# Patient Record
Sex: FEMALE | Race: WHITE | NOT HISPANIC OR LATINO | Employment: FULL TIME | URBAN - NONMETROPOLITAN AREA
[De-identification: names, ages, dates, MRNs, and addresses within clinical notes are randomized per-mention and may not be internally consistent; named-entity substitution may affect disease eponyms.]

---

## 2017-03-08 ENCOUNTER — OFFICE VISIT - GICH (OUTPATIENT)
Dept: FAMILY MEDICINE | Facility: OTHER | Age: 52
End: 2017-03-08

## 2017-03-08 DIAGNOSIS — T14.8XXA OTHER INJURY OF UNSPECIFIED BODY REGION: ICD-10-CM

## 2017-03-20 ENCOUNTER — COMMUNICATION - GICH (OUTPATIENT)
Dept: FAMILY MEDICINE | Facility: OTHER | Age: 52
End: 2017-03-20

## 2017-03-20 DIAGNOSIS — M54.9 DORSALGIA: ICD-10-CM

## 2017-04-05 ENCOUNTER — COMMUNICATION - GICH (OUTPATIENT)
Dept: FAMILY MEDICINE | Facility: OTHER | Age: 52
End: 2017-04-05

## 2017-04-05 DIAGNOSIS — T14.8XXA OTHER INJURY OF UNSPECIFIED BODY REGION: ICD-10-CM

## 2017-05-07 ENCOUNTER — COMMUNICATION - GICH (OUTPATIENT)
Dept: FAMILY MEDICINE | Facility: OTHER | Age: 52
End: 2017-05-07

## 2017-05-07 DIAGNOSIS — T14.8XXA OTHER INJURY OF UNSPECIFIED BODY REGION: ICD-10-CM

## 2017-06-07 ENCOUNTER — HISTORY (OUTPATIENT)
Dept: FAMILY MEDICINE | Facility: OTHER | Age: 52
End: 2017-06-07

## 2017-06-07 ENCOUNTER — OFFICE VISIT - GICH (OUTPATIENT)
Dept: FAMILY MEDICINE | Facility: OTHER | Age: 52
End: 2017-06-07

## 2017-06-07 ENCOUNTER — COMMUNICATION - GICH (OUTPATIENT)
Dept: FAMILY MEDICINE | Facility: OTHER | Age: 52
End: 2017-06-07

## 2017-06-07 DIAGNOSIS — M70.50 OTHER BURSITIS OF KNEE, UNSPECIFIED KNEE: ICD-10-CM

## 2017-06-07 DIAGNOSIS — M17.12 PRIMARY OSTEOARTHRITIS OF LEFT KNEE: ICD-10-CM

## 2017-06-07 DIAGNOSIS — Z71.6 TOBACCO ABUSE COUNSELING: ICD-10-CM

## 2017-06-07 DIAGNOSIS — J44.9 CHRONIC OBSTRUCTIVE PULMONARY DISEASE (H): ICD-10-CM

## 2017-06-07 DIAGNOSIS — J42 CHRONIC BRONCHITIS (H): ICD-10-CM

## 2017-06-22 ENCOUNTER — AMBULATORY - GICH (OUTPATIENT)
Dept: FAMILY MEDICINE | Facility: OTHER | Age: 52
End: 2017-06-22

## 2017-06-22 ENCOUNTER — HOSPITAL ENCOUNTER (OUTPATIENT)
Dept: RADIOLOGY | Facility: OTHER | Age: 52
End: 2017-06-22
Attending: FAMILY MEDICINE

## 2017-06-22 DIAGNOSIS — J44.9 CHRONIC OBSTRUCTIVE PULMONARY DISEASE (H): ICD-10-CM

## 2017-06-22 DIAGNOSIS — M25.562 PAIN IN LEFT KNEE: ICD-10-CM

## 2017-06-28 ENCOUNTER — OFFICE VISIT - GICH (OUTPATIENT)
Dept: FAMILY MEDICINE | Facility: OTHER | Age: 52
End: 2017-06-28

## 2017-06-28 ENCOUNTER — HOSPITAL ENCOUNTER (OUTPATIENT)
Dept: RADIOLOGY | Facility: OTHER | Age: 52
End: 2017-06-28
Attending: NURSE PRACTITIONER

## 2017-06-28 ENCOUNTER — HISTORY (OUTPATIENT)
Dept: FAMILY MEDICINE | Facility: OTHER | Age: 52
End: 2017-06-28

## 2017-06-28 DIAGNOSIS — R07.81 PLEURODYNIA: ICD-10-CM

## 2017-06-28 DIAGNOSIS — S46.812A STRAIN OF OTHER MUSCLES, FASCIA AND TENDONS AT SHOULDER AND UPPER ARM LEVEL, LEFT ARM, INITIAL ENCOUNTER: ICD-10-CM

## 2017-06-28 DIAGNOSIS — S20.212A CONTUSION OF LEFT FRONT WALL OF THORAX: ICD-10-CM

## 2017-07-25 ENCOUNTER — COMMUNICATION - GICH (OUTPATIENT)
Dept: FAMILY MEDICINE | Facility: OTHER | Age: 52
End: 2017-07-25

## 2017-07-25 DIAGNOSIS — F41.0 PANIC DISORDER WITHOUT AGORAPHOBIA: ICD-10-CM

## 2017-10-26 ENCOUNTER — COMMUNICATION - GICH (OUTPATIENT)
Dept: FAMILY MEDICINE | Facility: OTHER | Age: 52
End: 2017-10-26

## 2017-10-26 DIAGNOSIS — F41.0 PANIC DISORDER WITHOUT AGORAPHOBIA: ICD-10-CM

## 2017-11-08 ENCOUNTER — OFFICE VISIT - GICH (OUTPATIENT)
Dept: FAMILY MEDICINE | Facility: OTHER | Age: 52
End: 2017-11-08

## 2017-11-08 ENCOUNTER — HISTORY (OUTPATIENT)
Dept: FAMILY MEDICINE | Facility: OTHER | Age: 52
End: 2017-11-08

## 2017-11-08 DIAGNOSIS — J45.20 MILD INTERMITTENT ASTHMA, UNCOMPLICATED: ICD-10-CM

## 2017-11-08 DIAGNOSIS — J42 CHRONIC BRONCHITIS (H): ICD-10-CM

## 2017-11-08 DIAGNOSIS — Z23 ENCOUNTER FOR IMMUNIZATION: ICD-10-CM

## 2017-11-08 DIAGNOSIS — F41.0 PANIC DISORDER WITHOUT AGORAPHOBIA: ICD-10-CM

## 2017-11-08 DIAGNOSIS — Z13.1 ENCOUNTER FOR SCREENING FOR DIABETES MELLITUS: ICD-10-CM

## 2017-11-08 DIAGNOSIS — T78.2XXA ANAPHYLACTIC SHOCK: ICD-10-CM

## 2017-11-08 DIAGNOSIS — E78.5 HYPERLIPIDEMIA: ICD-10-CM

## 2017-11-08 LAB
A/G RATIO - HISTORICAL: 1.4 (ref 1–2)
ALBUMIN SERPL-MCNC: 4.2 G/DL (ref 3.5–5.7)
ALP SERPL-CCNC: 67 IU/L (ref 34–104)
ALT (SGPT) - HISTORICAL: 13 IU/L (ref 7–52)
ANION GAP - HISTORICAL: 16 (ref 5–18)
AST SERPL-CCNC: 18 IU/L (ref 13–39)
BILIRUB SERPL-MCNC: 0.6 MG/DL (ref 0.3–1)
BUN SERPL-MCNC: 19 MG/DL (ref 7–25)
BUN/CREAT RATIO - HISTORICAL: 29
CALCIUM SERPL-MCNC: 9.9 MG/DL (ref 8.6–10.3)
CHLORIDE SERPLBLD-SCNC: 101 MMOL/L (ref 98–107)
CHOL/HDL RATIO - HISTORICAL: 3.42
CHOLESTEROL TOTAL: 236 MG/DL
CO2 SERPL-SCNC: 26 MMOL/L (ref 21–31)
CREAT SERPL-MCNC: 0.65 MG/DL (ref 0.7–1.3)
GFR IF NOT AFRICAN AMERICAN - HISTORICAL: >60 ML/MIN/1.73M2
GLOBULIN - HISTORICAL: 3 G/DL (ref 2–3.7)
GLUCOSE SERPL-MCNC: 93 MG/DL (ref 70–105)
HDLC SERPL-MCNC: 69 MG/DL (ref 23–92)
LDLC SERPL CALC-MCNC: 145 MG/DL
NON-HDL CHOLESTEROL - HISTORICAL: 167 MG/DL
POTASSIUM SERPL-SCNC: 4.3 MMOL/L (ref 3.5–5.1)
PROT SERPL-MCNC: 7.2 G/DL (ref 6.4–8.9)
PROVIDER ORDERDED STATUS - HISTORICAL: ABNORMAL
SODIUM SERPL-SCNC: 143 MMOL/L (ref 133–143)
TRIGL SERPL-MCNC: 108 MG/DL

## 2017-11-08 ASSESSMENT — ANXIETY QUESTIONNAIRES
6. BECOMING EASILY ANNOYED OR IRRITABLE: NEARLY EVERY DAY
GAD7 TOTAL SCORE: 15
4. TROUBLE RELAXING: NEARLY EVERY DAY
1. FEELING NERVOUS, ANXIOUS, OR ON EDGE: NEARLY EVERY DAY
5. BEING SO RESTLESS THAT IT IS HARD TO SIT STILL: NOT AT ALL
3. WORRYING TOO MUCH ABOUT DIFFERENT THINGS: SEVERAL DAYS
2. NOT BEING ABLE TO STOP OR CONTROL WORRYING: NEARLY EVERY DAY
7. FEELING AFRAID AS IF SOMETHING AWFUL MIGHT HAPPEN: MORE THAN HALF THE DAYS

## 2017-11-08 ASSESSMENT — PATIENT HEALTH QUESTIONNAIRE - PHQ9: SUM OF ALL RESPONSES TO PHQ QUESTIONS 1-9: 3

## 2017-11-16 ENCOUNTER — HISTORY (OUTPATIENT)
Dept: FAMILY MEDICINE | Facility: OTHER | Age: 52
End: 2017-11-16

## 2017-11-29 ENCOUNTER — COMMUNICATION - GICH (OUTPATIENT)
Dept: FAMILY MEDICINE | Facility: OTHER | Age: 52
End: 2017-11-29

## 2017-11-29 DIAGNOSIS — F41.0 PANIC DISORDER WITHOUT AGORAPHOBIA: ICD-10-CM

## 2017-12-27 NOTE — PROGRESS NOTES
Patient Information     Patient Name MRPuja Terrazas [id number] Female 1965      Progress Notes by Chaz Mcneill MD at 2017  9:00 AM     Author:  Chaz Mcneill MD Service:  (none) Author Type:  Physician     Filed:  2017  6:43 AM Encounter Date:  2017 Status:  Signed     :  Chaz Mcneill MD (Physician)            Nursing Notes:   Krista Muse  2017  9:53 AM  Signed  Patient came in today for medication review and refills. She is also due for fasting labs.      SUBJECTIVE:  Puja Ocasio is a 52 y.o. Female.  She comes in today for refills of her respiratory medications, antidepressants and to discuss smoking cessation. She is also due for cardiovascular, diabetes and breast cancer screenings.    Patient has had quite a bit of anxiety lately. She tells me that her son is struggling to adjust to adult life. He does have a job but is in the process of breaking up with his significant other. He has 3 children with 3 different women. She tells me that when things get difficult for him he just seems to walk away. She is trying to not enable him but this is a great deal of stress and she is constantly catastrophize in that she will get a call that he did something stupid and was injured or even killed. She reports she is not addicted to substances at least to the best of her knowledge beyond being prescribed Adderall for some time.    In addition to some catastrophizing. she reports ruminating thinking. Is very difficult for her to sleep. She has tried relaxation breathing which is minimally effective. She does continue to smoke. She is smoking due to anxiety.    She has not had any chest pain or shortness of breath.    OLYA-7 ANXIETY SCREENING 2017   OLYA date (doc flow) 2017   Nervous, anxious 3   Cannot stop worrying 3   Worry about different things 1   Cannot relax 3   Feeling restless 0   Easily annoyed/irritated 3   Afraid of  "awful event 2   Score 15   Severity severe anxiety   Some recent data might be hidden      Asthma Data 6/7/2017 11/8/2017   DATE COMPLETED (Timeframe for the following questions is the past 4 weeks) 6/7/2017 11/8/2017   1. Did you miss any work, school, or normal daily activities because of your asthma? 0-No 0-No   2. Did you wake up at night because of your asthma? 0-No 0-No   3. Did you believe that your asthma was well controlled? 0-Yes 0-Yes   4. Do you use an inhaler for quick relief from asthma symptoms? Yes Yes        IF YES, what was the greatest number of puffs in 1 day you took with the inhaler? 0- 1 to 4 0- 1 to 4   ATAQ Score 0 0   In the past 12 months, number of asthma-related emergency department visits not requiring hospitalization? 0 0   In the past 12 months, number of hospitalizations requiring an overnight stay due to asthma? 0 0   Date completed - -   Missed daily activities - -   Wake at night - -   Believe asthma in control - -   ELLIOTT use - -   Maximum ELLIOTT use in 1 day - -   ATAQ score - -   Asthma ED visits in past 12 mos - -   Asthma hospitalizations in past 12 mos - -   Some recent data might be hidden             Social History        Substance Use Topics          Smoking status:   Current Every Day Smoker      Packs/day:  0.50      Years:  25.00      Types:  Cigarettes      Smokeless tobacco:   Never Used      Alcohol use   Yes      Comment: occasional         I have personally reviewed and updated above noted social, family and/or past medical history.    A comprehensive review of systems was negative except for items noted in HPI/Subjective.      OBJECTIVE:  /72  Pulse 64  Ht 1.568 m (5' 1.75\")  Wt 71.7 kg (158 lb)  BMI 29.13 kg/m2  EXAM:  General Appearance: Pleasant, alert, appropriate appearance for age. No acute distress  Thyroid Exam: No nodules or enlargement.  Chest/Respiratory Exam: Normal chest wall and respirations. Clear to auscultation.  Cardiovascular Exam: " Regular rate and rhythm. S1, S2, no murmur, click, gallop, or rubs.  Gastrointestinal Exam: Soft, nontender, no abnormal masses or organomegaly.    ASSESSMENT/Plan :    I personally reviewed the patients' labs    Results for orders placed or performed in visit on 11/08/17      LIPID PANEL      Result  Value Ref Range    CHOLESTEROL,TOTAL 236 (H) <200 mg/dL    TRIGLYCERIDES 108 <150 mg/dL    HDL CHOLESTEROL 69 23 - 92 mg/dL    NON-HDL CHOLESTEROL 167 (H) <145 mg/dl    CHOL/HDL RATIO            3.42 <4.50                    LDL CHOLESTEROL 145 (H) <100 mg/dL    PROVIDER ORDERED STATUS FASTING    COMP METABOLIC PANEL      Result  Value Ref Range    SODIUM 143 133 - 143 mmol/L    POTASSIUM 4.3 3.5 - 5.1 mmol/L    CHLORIDE 101 98 - 107 mmol/L    CO2,TOTAL 26 21 - 31 mmol/L    ANION GAP 16 5 - 18                    GLUCOSE 93 70 - 105 mg/dL    CALCIUM 9.9 8.6 - 10.3 mg/dL    BUN 19 7 - 25 mg/dL    CREATININE 0.65 (L) 0.70 - 1.30 mg/dL    BUN/CREAT RATIO           29                    GFR if African American >60 >60 ml/min/1.73m2    GFR if not African American >60 >60 ml/min/1.73m2    ALBUMIN 4.2 3.5 - 5.7 g/dL    PROTEIN,TOTAL 7.2 6.4 - 8.9 g/dL    GLOBULIN                  3.0 2.0 - 3.7 g/dL    A/G RATIO 1.4 1.0 - 2.0                    BILIRUBIN,TOTAL 0.6 0.3 - 1.0 mg/dL    ALK PHOSPHATASE 67 34 - 104 IU/L    ALT (SGPT) 13 7 - 52 IU/L    AST (SGOT) 18 13 - 39 IU/L       Puja was seen today for medication management.    Diagnoses and all orders for this visit:    Hyperlipidemia, unspecified hyperlipidemia type  cholesterol reasonable. LDL slightly high but so is HDL. Ratio is good. Strongly suggest tobacco cessation.  -     LIPID PANEL; Future  -     LIPID PANEL    PANIC ATTACK  patient with breakthrough anxiety despite Effexor. I explained to her that Effexor is fairly activating and there may be a better choice for her to help with anxiety. She reports she has tried other medications and that did not seem to  work very well or she had side effects. Should like to optimize dosage prior to transition. We'll increase to 225 mg. Patient will follow-up in a month or 2, sooner if mood does not improve and may space it out further if she has excellent resolution.    We did have a lengthy talk today about drying boundaries with her son. Strongly suggest counseling. Patient was good understanding of all of my concerns.    -     venlafaxine (EFFEXOR XR) 150 mg Extended-Release capsule; Take 1 capsule by mouth once daily with evening meal.  -     venlafaxine (EFFEXOR) 75 mg tablet; Take 1 tablet with the 150mg capsule (ok to sub capsule for tablet)    Mild intermittent asthma without complication   well controlled despite ongoing tobacco abuse. Medications refilled and strongly suggest tobacco cessation    Screening for diabetes mellitus  normal.  -     COMP METABOLIC PANEL; Future  -     COMP METABOLIC PANEL    Need for prophylactic vaccination with combined diphtheria-tetanus-pertussis (DTaP) vaccine  -     TDAP VACCINE IM  -     LA ADMIN VACC INITIAL          There are no Patient Instructions on file for this visit.    Chaz Mcneill MD    This document was created using computer generated templates and voice activated software.

## 2017-12-27 NOTE — PROGRESS NOTES
Patient Information     Patient Name MRN Puja Davis [id number] Female 1965      Progress Notes by Jordana Mehta NP at 2017  9:45 AM     Author:  Jordana Mehta NP Service:  (none) Author Type:  PHYS- Nurse Practitioner     Filed:  2017  2:07 PM Encounter Date:  2017 Status:  Signed     :  Jordana Mehta NP (PHYS- Nurse Practitioner)            Nursing Notes:   Bita Goins  2017 10:23 AM  Signed  Patient presents with pain near left shoulder. Patient fell last Thursday. Patient now has pain when taking a deep breath. Patient has tried meloxicam and ibuprofen but not together, ice and heat and tramadol. Bita Goins LPN .............2017  9:52 AM  SUBJECTIVE:    Puja Ocasio is a 52 y.o. female who presents for rib pain    Chest Pain    This is a new problem. The current episode started in the past 7 days. The onset quality is sudden. The problem occurs constantly. The problem has been unchanged. The pain is present in the lateral region. The pain is at a severity of 8/10. The pain is severe. The quality of the pain is described as sharp, stabbing and tearing. The pain radiates to the left shoulder (LT upper rib cage and side). Pertinent negatives include no cough, dizziness, fever, headaches, lower extremity edema, malaise/fatigue, nausea, orthopnea, shortness of breath or sputum production. Associated symptoms comments: She fell a week ago and has pain in the upper chest, LT side under arm and into scapula area. . The pain is aggravated by coughing and deep breathing. She has tried NSAIDs, rest, acetaminophen and analgesics for the symptoms. The treatment provided mild relief. There are no known risk factors.   Her past medical history is significant for recent injury.   Pertinent negatives for past medical history include no connective tissue disease, no COPD, no CHF, no MI, no rheumatic fever, no sleep apnea, no  spontaneous pneumothorax and no stimulant use.       Current Outpatient Prescriptions on File Prior to Visit       Medication  Sig Dispense Refill     albuterol HFA (PROAIR HFA) 90 mcg/actuation inhaler Inhale 2 Puffs by mouth every 4 hours if needed. 8.5 g 6     EPINEPHrine (EPIPEN) 0.3 mg/0.3 mL injection Inject 0.3 mg intramuscular one time if needed for Allergic Reaction for up to 1 dose. 2 Each 0     Inhalational Spacing Device (AEROCHAMBER MV) For home use. 1 Device 0     mometasone-formoterol (DULERA) 100-5 mcg/actuation inhaler Inhale 2 Puffs by mouth 2 times daily. 1 Inhaler 12     varenicline (CHANTIX CONTINUING MONTH BOX) 1 mg tablet Take 1 mg by mouth once daily with a meal. 60 tablet 4     venlafaxine (EFFEXOR XR) 150 mg Extended-Release capsule Take 1 capsule by mouth once daily with evening meal. 30 capsule 11     No current facility-administered medications on file prior to visit.        REVIEW OF SYSTEMS:  Review of Systems   Constitutional: Negative for fever and malaise/fatigue.   Respiratory: Negative for cough, sputum production and shortness of breath.    Cardiovascular: Positive for chest pain. Negative for orthopnea.   Gastrointestinal: Negative for nausea.   Neurological: Negative for dizziness and headaches.       OBJECTIVE:  /78  Pulse 88  Temp 96.8  F (36  C) (Temporal)  Wt 68.9 kg (152 lb)  Breastfeeding? No  BMI 26.93 kg/m2    EXAM:   Physical Exam   Constitutional: She is well-developed, well-nourished, and in no distress.   HENT:   Head: Normocephalic and atraumatic.   Eyes: Conjunctivae are normal.   Neck: Neck supple.   Cardiovascular: Normal rate, regular rhythm and normal heart sounds.    Pulmonary/Chest: Effort normal and breath sounds normal. No respiratory distress. She has no decreased breath sounds. She has no wheezes. She has no rhonchi. She has no rales.               Diagrams show where she is tender to palpation.    Musculoskeletal:        Left shoulder: She  exhibits tenderness.        Cervical back: Normal.   Tender over the LT Trapezius    Lymphadenopathy:     She has no cervical adenopathy.   Nursing note and vitals reviewed.    Completed Rib xray.  I personally reviewed the xray. There was no fractures upon initial read of xray.  Final read pending by radiology.    ASSESSMENT/PLAN:    ICD-10-CM    1. Rib pain on left side R07.81 XR RIBS LEFT AND PA CHEST MINIMUM 3 VIEWS      HYDROcodone-acetaminophen, 5-325 mg, (NORCO) per tablet   2. Trapezius muscle strain, left, initial encounter S46.812A cyclobenzaprine (FLEXERIL) 5 mg tablet      HYDROcodone-acetaminophen, 5-325 mg, (NORCO) per tablet   3. Rib contusion, left, initial encounter S20.212A HYDROcodone-acetaminophen, 5-325 mg, (NORCO) per tablet        Plan:  Home cares and OTC gone over. Meds discussed including narcotics.   I have prescribed a narcotic:    1) Please make sure you read the handout the pharmacist gives you on this drug.   2) Any unused narcotics need to be destroyed or taken to the police department.   3) Do not save unused narcotics   4) Do not give your pills to other people.   5) Do not crush, snort or otherwise alter the drug  6) Do not use in combination with other drugs such as benadryl, alcohol, or benzodiopines.   7) If you have any questions on your medication please call your pharmacist or PCP office.   8) This will not be refilled.     I explained my diagnostic considerations and recommendations to the patient, who voiced understanding and agreement with the treatment plan. All questions were answered. We discussed potential side effects of any prescribed or recommended therapies, as well as expectations for response to treatments. She was advised to contact our office if there is no improvement or worsening of conditions or symptoms.  If s/s worsen or persist, patient will either come back or follow up with PCP.       ANNITA SAMS NP ....................  6/28/2017   2:06  PM

## 2017-12-28 NOTE — TELEPHONE ENCOUNTER
Patient Information     Patient Name MRN Sex     Puja Ocasio [id number] Female 1965      Telephone Encounter by Marcel Grey RN at 2017  9:14 AM     Author:  Marcel Grey RN Service:  (none) Author Type:  NURS- Registered Nurse     Filed:  2017  9:18 AM Encounter Date:  2017 Status:  Signed     :  Marcel Grey RN (NURS- Registered Nurse)            Redundant Refill Request for Effexor XR refused;    Prescribing Provider: Chaz Livingston MD             Order Date: 2017  Ordered by: CHAZ LIVINGSTON  Medication:venlafaxine (EFFEXOR XR) 150 mg Extended-Release capsule    Qty:90 capsule  Ref:3  Start:2017   End:              Route:Oral                  IRENE:No   Class:eRx    Sig:Take 1 capsule by mouth once daily with evening meal.    Pharmacy:[facility] [address] [place] [address] AT [address]              OF [facility] [address] [phone]    Unable to complete prescription refill per RN Medication Refill Policy.................... Marcel Grey RN ....................  2017   9:14 AM

## 2017-12-28 NOTE — TELEPHONE ENCOUNTER
Patient Information     Patient Name MRN Sex Puja Goddard [id number] Female 1965      Telephone Encounter by Marcel Grey RN at 2017  1:11 PM     Author:  Marcel Grey RN Service:  (none) Author Type:  NURS- Registered Nurse     Filed:  2017  1:18 PM Encounter Date:  2017 Status:  Signed     :  Marcel Grey RN (NURS- Registered Nurse)            Depression-in adults 18 and over  Serotonin/Norepinephrine Reuptake Inhibitors    Office visit in the past 12 months or as indicated in chart.  Should have clinic visit 1-2 months after initial prescription.    Last visit with CARLOS ALBERTO LIVINGSTON was on: 2017 in [facility]  Next visit with CARLOS ALBERTO LIVINGSTON is on: No future appointment listed with this provider  Next visit with Family Practice is on: No future appointment listed in this department    Max refills 12 months from last office visit or per providers notes.    PHQ Depression Screening 10/27/2016 2017   Date of PHQ exam (doc flow) 10/27/2016 2017   1. Lack of interest/pleasure 0 - Not at all 0 - Not at all   2. Feeling down/depressed 0 - Not at all 0 - Not at all   PHQ-2 TOTAL SCORE 0 0   3. Trouble sleeping - -   4. Decreased energy - -   5. Appetite change - -   6. Feelings of failure - -   7. Trouble concentrating - -   8. Activity level - -   9. Hurting yourself - -   PHQ-9 TOTAL SCORE - -   PHQ-9 Severity Level - -   Functional Impairment - -   Some recent data might be hidden       Patient is due for medication management appointment as last office visit with PCP to discuss use of effexor, as well as diagnosis of depression with anxiety was on 16, of which effexor was continued. Limited refill provided at this time and letter sent for reminder to patient. Prescription refilled per RN Medication Refill Policy.................... Marcel Grey RN ....................  2017   1:15 PM

## 2017-12-28 NOTE — PATIENT INSTRUCTIONS
Patient Information     Patient Name MRN Sex     Puja Ocasio [id number] Female 1965      Patient Instructions by Jordana Mehta NP at 2017  9:45 AM     Author:  Jordana Mehta NP  Service:  (none) Author Type:  PHYS- Nurse Practitioner     Filed:  2017 10:51 AM  Encounter Date:  2017 Status:  Addendum     :  Jordana Mehta NP (PHYS- Nurse Practitioner)        Related Notes: Original Note by Jordana Mehta NP (PHYS- Nurse Practitioner) filed at 2017 10:45 AM            The x-ray today showed no sign of fracture. Radiologist will review the X-ray within 24-48 hours, I will contact you if the radiologist finds anything of significance on the x-ray that I did not see.    Rest and avoid any activity which causes pain.    Apply cold packs to the affected area for 15-20 minutes, 4 times a day. A bag of frozen corn or peas often works well as a cold pack. A cold pack is usually the best treatment for the 1st 2 days after an injury. After 48 hours, apply heat or ice, whichever gives relief.    Cough with splinting the ribcage      Also, Take ibuprofen (Advil, Motrin) or naproxen (Aleve), or a similar prescription medication. Use regularly for the first 7-10 days. Later, take as needed for pain, swelling or stiffness. Take this type of medication with food to minimize any stomach irritation. Tylenol may also be taken to help ease the pain.    Call or return to clinic as needed if your pain becomes significantly worse, or fails to improve as anticipated despite following the above recommendations.    I have prescribed a narcotic:    1) Please make sure you read the handout the pharmacist gives you on this drug.   2) Any unused narcotics need to be destroyed or taken to the police department.   3) Do not save unused narcotics   4) Do not give your pills to other people.   5) Do not crush, snort or otherwise alter the drug  6) Do not use in combination with other drugs  such as benadryl, alcohol, or benzodiopines.   7) If you have any questions on your medication please call your pharmacist or PCP office.   8) This will not be refilled.

## 2017-12-28 NOTE — TELEPHONE ENCOUNTER
Patient Information     Patient Name MRN Sex     Puja Ocasio [id number] Female 1965      Telephone Encounter by Marcel Grey RN at 2017 11:46 AM     Author:  Marcel Grey RN Service:  (none) Author Type:  NURS- Registered Nurse     Filed:  2017 11:49 AM Encounter Date:  2017 Status:  Signed     :  Marcel Grey RN (NURS- Registered Nurse)            This is a Refill request from: [facility] pharmacy  Name of Medication: Spacer for inhaler  Quantity requested: 1 device with 1 refill  Last fill date: Unknown as per rx request  Due for refill: Yes, as per chart review and rx request  Last visit with CHAZ LIVINGSTON was on: 2017 in [facility]  PCP:  Chaz Livingston MD  Controlled Substance Agreement:  N/A   Diagnosis r/t this medication request: COPD     Unable to complete prescription refill per RN Medication Refill Policy.................... Marcel Grey RN ....................  2017   11:47 AM

## 2017-12-28 NOTE — PROGRESS NOTES
Patient Information     Patient Name MRN Puja Davis [id number] Female 1965      Progress Notes by Chaz Mcneill MD at 2017 12:00 PM     Author:  Chaz Mcneill MD  Service:  (none) Author Type:  Physician     Filed:  2017 12:05 PM  Encounter Date:  2017 Status:  Addendum     :  Chaz Mcneill MD (Physician)        Related Notes: Original Note by Chaz Mcneill MD (Physician) filed at 2017  7:59 AM            SUBJECTIVE:  Puja Ocasio is a 51 y.o. Female.  She comes in today for evaluation of left knee pain. Symptoms have been present for one month. She describes pain over the medial side of her knee. Worse with bending and especially with kneeling but the kneeling pain seems to be more infrapatellar. If she sits down and gets weight off of it seems to be better. Pain does not radiate anywhere. Has not noticed any swelling at the actual knee joint but does report some swelling just at the area where it hurts. Did not have any trauma that she is aware. Described as moderate to severe.    Reports as well as been reasonably well-controlled. She might have an insurance change coming up. Has been taking Advair somewhat intermittently.    Asthma Data 2016   DATE COMPLETED (Timeframe for the following questions is the past 4 weeks) - 2017   1. Did you miss any work, school, or normal daily activities because of your asthma? - 0-No   2. Did you wake up at night because of your asthma? - 0-No   3. Did you believe that your asthma was well controlled? - 0-Yes   4. Do you use an inhaler for quick relief from asthma symptoms? - Yes   If yes, what was the greatest number of puffs in 1 day you took with the inhaler? If No, select 0. - 0- 1 to 4   ATAQ Score - 0   In the past 12 months, number of asthma-related emergency department visits not requiring hospitalization? - (No Data)   In the past 12 months, number of  hospitalizations requiring an overnight stay due to asthma? - (No Data)   Date completed 8/8/2016 -   Missed daily activities no = 0 -   Wake at night no = 0 -   Believe asthma in control yes = 0 -   ELLIOTT use yes -   Maximum ELLIOTT use in 1 day 1-4 = 0 -   ATAQ score 0 = well controlled -   Asthma ED visits in past 12 mos 0 -   Asthma hospitalizations in past 12 mos 0 -         OBJECTIVE:  /64  Pulse 84  Wt 69.4 kg (153 lb)  BMI 27.1 kg/m2  EXAM:  General Appearance: Pleasant, alert, appropriate appearance for age. No acute distress  Chest/Respiratory Exam: Normal chest wall and respirations. Clear to auscultation.  Cardiovascular Exam: Regular rate and rhythm. S1, S2, no murmur, click, gallop, or rubs.  Musculoskeletal Exam: No synovitis.  Muscle strength age and body habitus appropriate as well as equal and even. Affected knee with no visual defomities.  No effusion present.  Patella with crepitus.  No joint line tenderness it is quite tender over presents over pes anserine bursa All knee ligaments in tact and bilaterally symmetric.  Victor Hugo's is negative.  Some pain with deep knee flexion.      Results for orders placed or performed during the hospital encounter of 10/21/16      BASIC METABOLIC PANEL      Result  Value Ref Range    SODIUM 142 133 - 143 mmol/L    POTASSIUM 3.6 3.5 - 5.1 mmol/L    CHLORIDE 105 98 - 107 mmol/L    CO2,TOTAL 28 21 - 31 mmol/L    ANION GAP 9 5 - 18                    GLUCOSE 111 (H) 70 - 105 mg/dL    CALCIUM 9.4 8.6 - 10.3 mg/dL    BUN 15 7 - 25 mg/dL    CREATININE 0.89 0.70 - 1.30 mg/dL    BUN/CREAT RATIO           17                    GFR if African American >60 >60 ml/min/1.73m2    GFR if not African American >60 >60 ml/min/1.73m2   CBC WITH AUTO DIFFERENTIAL      Result  Value Ref Range    WHITE BLOOD COUNT         5.6 4.5 - 11.0 thou/cu mm    RED BLOOD COUNT           5.05 4.00 - 5.20 mil/cu mm    HEMOGLOBIN                15.1 12.0 - 16.0 g/dL    HEMATOCRIT                 44.6 33.0 - 51.0 %    MCV                       88 80 - 100 fL    MCH                       29.9 26.0 - 34.0 pg    MCHC                      33.8 32.0 - 36.0 g/dL    RDW                       11.9 11.5 - 15.5 %    PLATELET COUNT            312 140 - 440 thou/cu mm    MPV                       7.3 6.5 - 11.0 fL    NEUTROPHILS               33.5 (L) 42.0 - 72.0 %    LYMPHOCYTES               62.4 (H) 20.0 - 44.0 %    MONOCYTES                 1.3 <12.0 %    EOSINOPHILS               0.8 <8.0 %    BASOPHILS                 2.0 <3.0 %    ABSOLUTE NEUTROPHILS      1.9 1.7 - 7.0 thou/cu mm    ABSOLUTE LYMPHOCYTES      3.5 (H) 0.9 - 2.9 thou/cu mm    ABSOLUTE MONOCYTES        0.1 <0.9 thou/cu mm    ABSOLUTE EOSINOPHILS      0.0 <0.5 thou/cu mm    ABSOLUTE BASOPHILS        0.1 <0.3 thou/cu mm       ASSESSMENT/Plan :      Puja was seen today for knee pain/problem.    Diagnoses and all orders for this visit:    Primary osteoarthritis of left knee  patient likely has some degree of osteoarthritis, mainly patellar.  I think most her pain however is due to presence or and bursitis.  Suggest physical therapy.  -     XR KNEE WB 1 VIEW AP BILATERAL AND 2 VIEWS LEFT; Future    Chronic obstructive pulmonary disease, unspecified COPD type (HC)  -     Inhalational Spacing Device (AEROCHAMBER MV); For home use.      Pes anserine bursitis  Risks, benefits and alternatives discussed with patient.  They voiced good understanding and had the opportunity to ask questions.  Patient gave consent and proceed with procedure.     The patient was prepped using chlorhexidine.  Using sterile procedure 20 mg of Kenalog was mixed with 1.5 mL of 1% lidocaine and 1 mL 0.25% bupivacaine per total volume of 3 mL.  Solution was injected into the pes anserine bursa.  Patient tolerated procedure well and had improvement of symptoms following.    -     AMB CONSULT TO PHYSICAL THERAPY; Future  -     TN DRAIN/INJECT LARGE JOINT/BURSA (hip, knee,shoulder)  - single joint  [68187.0]  -     triamcinolone acetonide 20 mg injection (KENALOG); Inject 2 mL intra-articular one time.    Chronic bronchitis, unspecified chronic bronchitis type (HC)  we'll refill albuterol and Dulera. Continue current medications.  -     albuterol HFA (PROAIR HFA) 90 mcg/actuation inhaler; Inhale 2 Puffs by mouth every 4 hours if needed.    Encounter for tobacco use cessation counseling  Patient would like to continue Chantix  -     varenicline (CHANTIX CONTINUING MONTH BOX) 1 mg tablet; Take 1 mg by mouth once daily with a meal.        There are no Patient Instructions on file for this visit.    Chaz Mcneill MD    This document was created using computer generated templates and voice activated software.

## 2017-12-28 NOTE — TELEPHONE ENCOUNTER
Patient Information     Patient Name MRPuja Terrazas [id number] Female 1965      Telephone Encounter by Marcel Grey RN at 10/30/2017  9:49 AM     Author:  Marcel Grey RN Service:  (none) Author Type:  NURS- Registered Nurse     Filed:  10/30/2017 10:00 AM Encounter Date:  10/26/2017 Status:  Signed     :  Marcel Grey RN (NURS- Registered Nurse)            Depression-in adults 18 and over  Serotonin/Norepinephrine Reuptake Inhibitors    Office visit in the past 12 months or as indicated in chart.  Should have clinic visit 1-2 months after initial prescription.    Last visit with CARLOS ALBERTO LIVINGSTON was on: 2017 in [facility]  Next visit with CARLOS ALBERTO LIVINGSTON is on: No future appointment listed with this provider  Next visit with Family Practice is on: No future appointment listed in this department    Max refills 12 months from last office visit or per providers notes.    PHQ Depression Screening 10/27/2016 2017   Date of PHQ exam (doc flow) 10/27/2016 2017   1. Lack of interest/pleasure 0 - Not at all 0 - Not at all   2. Feeling down/depressed 0 - Not at all 0 - Not at all   PHQ-2 TOTAL SCORE 0 0   3. Trouble sleeping - -   4. Decreased energy - -   5. Appetite change - -   6. Feelings of failure - -   7. Trouble concentrating - -   8. Activity level - -   9. Hurting yourself - -   PHQ-9 TOTAL SCORE - -   PHQ-9 Severity Level - -   Functional Impairment - -   Some recent data might be hidden       Chart review shows that patient is overdue for an office visit with PCP. Was sent a reminder letter with last refill on 17-See 17 refill encounter. Call placed to patient to discuss. Patient is willing to see PCP for an appointment. Writer advised patient that he could fill rx until she could be seen. Patient happy with plan of care. Patient was transferred to scheduling staff for an appointment. Appointment noted to be scheduled for 17.  Writer will refill rx as requested to get patient through her appointment date as noted.    Prescription refilled per RN Medication Refill Policy.................... Marcel Grey RN ....................  10/30/2017   9:57 AM

## 2017-12-28 NOTE — ADDENDUM NOTE
Patient Information     Patient Name MRN Sex Puja Goddard [id number] Female 1965      Addendum Note by Chaz Livingston MD at 2017 12:05 PM     Author:  Chaz Livingston MD Service:  (none) Author Type:  Physician     Filed:  2017 12:05 PM Encounter Date:  2017 Status:  Signed     :  Chaz Livingston MD (Physician)       Addended by: CHAZ LIVINGSTON on: 2017 12:05 PM        Modules accepted: Orders

## 2017-12-30 NOTE — NURSING NOTE
Patient Information     Patient Name MRN Sex Puja Goddard [id number] Female 1965      Nursing Note by Bita Goins at 2017  9:45 AM     Author:  Bita Goins Service:  (none) Author Type:  NURS- Student Practical Nurse     Filed:  2017 10:23 AM Encounter Date:  2017 Status:  Signed     :  Bita Goins (NURS- Student Practical Nurse)            Patient presents with pain near left shoulder. Patient fell last Thursday. Patient now has pain when taking a deep breath. Patient has tried meloxicam and ibuprofen but not together, ice and heat and tramadol. Bita Goins LPN .............2017  9:52 AM

## 2017-12-30 NOTE — NURSING NOTE
Patient Information     Patient Name MRN Sex     Puja Ocasio [id number] Female 1965      Nursing Note by Krista Muse at 2017  9:00 AM     Author:  Krista Muse Service:  (none) Author Type:  (none)     Filed:  2017  9:53 AM Encounter Date:  2017 Status:  Signed     :  Krista Muse            Patient came in today for medication review and refills. She is also due for fasting labs.

## 2018-01-03 NOTE — TELEPHONE ENCOUNTER
Patient Information     Patient Name MRN Sex     Puja Ocasio [id number] Female 1965      Telephone Encounter by Mary Hassan at 3/20/2017 10:10 AM     Author:  Mary Hassan Service:  (none) Author Type:  (none)     Filed:  3/20/2017 10:13 AM Encounter Date:  3/20/2017 Status:  Signed     :  Mary Hassan            She has been using the Meloxicam and Flexeril since she saw you, but it's not working. She says you mentioned something else, like Prednisone or something? What else could you try?  Mary Hassan CMA(AAMA)  ..................3/20/2017   10:11 AM

## 2018-01-03 NOTE — PROGRESS NOTES
Patient Information     Patient Name MRN Sex     Puja Ocasio [id number] Female 1965      Progress Notes by Chaz Mcneill MD at 3/8/2017  3:00 PM     Author:  Chaz Mcneill MD Service:  (none) Author Type:  Physician     Filed:  3/13/2017  1:25 PM Encounter Date:  3/8/2017 Status:  Signed     :  Chaz Mcneill MD (Physician)            SUBJECTIVE:  Puja Ocasio is a 51 y.o. Female.  Patient comes in after slipping and falling on ice about 6 weeks ago. She reports partially catching a softball landing on her back. She ha had the wind knocked out of her. She denies any head trauma or symptoms of concussion. Pain initially was moderate then became quite severe for a couple days afterwards. Gradually improved but now over the past few weeks has seemed to stagnate. Described as a sharp pain in her upper back. Worse with breathing or twisting and better with sitting still. Pain does not radiate. Seems to worse as the day goes on. Occasionally has spasm sensation. Currently moderate. Ibuprofen helps as does icing. She reports no shortness of breath but does report some pain with deep inspiration. No hemoptysis.  No pain with compression of thorax.      OBJECTIVE:  /82  Pulse 72  Wt 73.9 kg (163 lb)  BMI 28.87 kg/m2  EXAM:  General Appearance: Pleasant, alert, appropriate appearance for age. No acute distress  Chest/Respiratory Exam: Normal chest wall and respirations. Clear to auscultation.  Cardiovascular Exam: Regular rate and rhythm. S1, S2, no murmur, click, gallop, or rubs.  Musculoskeletal Exam: Tender with palpation of paraspinous muscle of thoracic spine around T8. Also some tenderness with rhomboids. Pain is readily reproducible with palpation.        ASSESSMENT/Plan :      Puja was seen today for follow up.    Diagnoses and all orders for this visit:    Muscle strain  I reviewed her imaging. Lung fields clear. No evidence of rib fracture. Patient's  current symptoms most consistent with slow to resolve muscle strain. We'll transition from ibuprofen to meloxicam. Stop all other NSAIDs. Continue with icing. May utilize Flexeril at night to help with sleep. If symptoms do not resolve in the next couple of weeks suggest physical therapy. I did offer her physical therapy today but she would like to hold off a couple of weeks. If she develops any definite shortness of breath or hemoptysis she will come in emergently.  -     cyclobenzaprine (FLEXERIL) 10 mg tablet; Take 1 tablet by mouth at bedtime if needed for Muscle Spasm.  -     meloxicam 15 mg tablet; Take 1 tablet by mouth once daily.        There are no Patient Instructions on file for this visit.    Chaz Mcneill MD    This document was created using computer generated templates and voice activated software.

## 2018-01-04 NOTE — TELEPHONE ENCOUNTER
Patient Information     Patient Name MRN Puja Davis [id number] Female 1965      Telephone Encounter by Chaz Mcneill MD at 3/24/2017 12:09 PM     Author:  Chaz Mcneill MD Service:  (none) Author Type:  Physician     Filed:  3/24/2017 12:10 PM Encounter Date:  3/20/2017 Status:  Signed     :  Chaz Mcneill MD (Physician)            We can do a prednisone burst or try some tramadol which is a selective narcotic.  Please call to get update and I can send out one or both of meds.

## 2018-01-04 NOTE — TELEPHONE ENCOUNTER
Patient Information     Patient Name MRN Sex Puja Goddard [id number] Female 1965      Telephone Encounter by Krista Muse at 2017  9:42 AM     Author:  Krista Muse Service:  (none) Author Type:  (none)     Filed:  2017  9:43 AM Encounter Date:  2017 Status:  Signed     :  Krista Muse            Patient states that she did not request this refill and is actually doing better. She does not need the refill at this time and will call or come in for a visit if anything changes.

## 2018-01-04 NOTE — TELEPHONE ENCOUNTER
Patient Information     Patient Name MRN Sex     Puja Ocasio [id number] Female 1965      Telephone Encounter by Marcel Grey RN at 2017  3:53 PM     Author:  Marcel Grey RN Service:  (none) Author Type:  NURS- Registered Nurse     Filed:  2017  4:12 PM Encounter Date:  2017 Status:  Signed     :  Marcel Grey RN (NURS- Registered Nurse)            Refill request received from [facility] for patient's meloxicam. Per chart review and last rx refill on 17, patient had stated she didn't actually need a refill of meloxicam, and PCP states that continued use requires follow up. Call placed to patient to discuss this rx refill from pharmacy. Patient confirms that refill is not needed. Requests that rx be taken off of autofill at pharmacy. Patient states she will follow up with PCP is additional fills needed. Call placed to pharmacy. Spoke to Garrett, pharmacist and advised him of above. Garrett stated understanding. Will remove rx request from system at this time. Writer will refuse rx request at this time as per patient's request.    Unable to complete prescription refill per RN Medication Refill Policy.................... Marcel Grey RN ....................  2017   4:10 PM

## 2018-01-04 NOTE — TELEPHONE ENCOUNTER
Patient Information     Patient Name MRN Sex Puja Goddard [id number] Female 1965      Telephone Encounter by Chaz Mcneill MD at 2017  9:31 AM     Author:  Chaz Mcneill MD Service:  (none) Author Type:  Physician     Filed:  2017  9:31 AM Encounter Date:  2017 Status:  Signed     :  Chaz Mcneill MD (Physician)            Suggest follow up if still having issues beyond the next couple weeks.

## 2018-01-04 NOTE — TELEPHONE ENCOUNTER
Patient Information     Patient Name MRN Sex     Puja Ocasio [id number] Female 1965      Telephone Encounter by Marcel Grey RN at 2017  9:10 AM     Author:  Marcel Grey RN Service:  (none) Author Type:  NURS- Registered Nurse     Filed:  2017  9:16 AM Encounter Date:  2017 Status:  Signed     :  Marcel Grey RN (NURS- Registered Nurse)            Nsaids    Office visit in the past 12 months or per provider note.    Last visit with CARL[facility] ALBERTO LIVINGSTON was on: 2017 in [facility]  Next visit with CARLOS ALBERTO LIVINGSTON is on: No future appointment listed with this provider  Next visit with Family Practice is on: No future appointment listed in this department    Max refill for 12 months from last office visit or per provider note.    Chart review shows that patient was started on Mobic at 3/8/17 office visit with PCP. No plan noted in relation to continued use of requested Rx. Will refill rx for 30 day supply and route encounter to PCP for his consideration of future refills.    Prescription refilled per RN Medication Refill Policy.................... Marcel Grey RN ....................  2017   9:14 AM

## 2018-01-04 NOTE — TELEPHONE ENCOUNTER
Patient Information     Patient Name MRN Sex     Puja Ocasio [id number] Female 1965      Telephone Encounter by Mary Hassan at 3/24/2017 12:12 PM     Author:  Mary Hassan Service:  (none) Author Type:  (none)     Filed:  3/24/2017 12:14 PM Encounter Date:  3/20/2017 Status:  Signed     :  Mary Hassan            She would like to have both sent in. She will try using the Tramadol only at night because she hurts the worst then.  Mary Hassan CMA(AAMA)  ..................3/24/2017   12:14 PM

## 2018-01-27 VITALS — DIASTOLIC BLOOD PRESSURE: 82 MMHG | WEIGHT: 163 LBS | HEART RATE: 72 BPM | SYSTOLIC BLOOD PRESSURE: 128 MMHG

## 2018-01-27 VITALS
BODY MASS INDEX: 27.1 KG/M2 | SYSTOLIC BLOOD PRESSURE: 108 MMHG | HEART RATE: 84 BPM | DIASTOLIC BLOOD PRESSURE: 64 MMHG | WEIGHT: 153 LBS

## 2018-01-27 VITALS
HEIGHT: 62 IN | HEART RATE: 64 BPM | WEIGHT: 158 LBS | SYSTOLIC BLOOD PRESSURE: 118 MMHG | DIASTOLIC BLOOD PRESSURE: 72 MMHG | BODY MASS INDEX: 29.08 KG/M2

## 2018-01-27 VITALS
HEART RATE: 88 BPM | TEMPERATURE: 96.8 F | SYSTOLIC BLOOD PRESSURE: 122 MMHG | DIASTOLIC BLOOD PRESSURE: 78 MMHG | WEIGHT: 152 LBS

## 2018-01-31 ASSESSMENT — PATIENT HEALTH QUESTIONNAIRE - PHQ9: SUM OF ALL RESPONSES TO PHQ QUESTIONS 1-9: 3

## 2018-01-31 ASSESSMENT — ANXIETY QUESTIONNAIRES: GAD7 TOTAL SCORE: 15

## 2018-02-02 ENCOUNTER — DOCUMENTATION ONLY (OUTPATIENT)
Dept: FAMILY MEDICINE | Facility: OTHER | Age: 53
End: 2018-02-02

## 2018-02-02 PROBLEM — J30.9 ALLERGIC RHINITIS: Status: ACTIVE | Noted: 2018-02-02

## 2018-02-02 PROBLEM — F41.0 PANIC ATTACK: Status: ACTIVE | Noted: 2018-02-02

## 2018-02-02 RX ORDER — EPINEPHRINE 0.3 MG/.3ML
0.3 INJECTION SUBCUTANEOUS
COMMUNITY
Start: 2017-11-20 | End: 2020-07-09

## 2018-02-02 RX ORDER — ALBUTEROL SULFATE 90 UG/1
2 AEROSOL, METERED RESPIRATORY (INHALATION) EVERY 4 HOURS PRN
COMMUNITY
Start: 2017-11-20 | End: 2019-01-22

## 2018-02-02 RX ORDER — VENLAFAXINE HYDROCHLORIDE 150 MG/1
150 CAPSULE, EXTENDED RELEASE ORAL
COMMUNITY
Start: 2017-11-08 | End: 2018-10-23

## 2018-02-02 RX ORDER — VARENICLINE TARTRATE 1 MG/1
1 TABLET, FILM COATED ORAL
COMMUNITY
Start: 2017-06-07 | End: 2019-01-22

## 2018-02-02 RX ORDER — INHALER, ASSIST DEVICES
SPACER (EA) MISCELLANEOUS
COMMUNITY
Start: 2017-06-07 | End: 2020-07-09

## 2018-02-02 RX ORDER — VENLAFAXINE 75 MG/1
1 TABLET ORAL
COMMUNITY
Start: 2017-11-08 | End: 2018-03-05

## 2018-03-05 DIAGNOSIS — F41.0 PANIC ATTACK: Primary | ICD-10-CM

## 2018-03-05 NOTE — TELEPHONE ENCOUNTER
"PLEASE REVIEW, SIGN AND SEND AS APPROPRIATE: THANK YOU.    Last office visit with Chaz Mcneill MD was on 11/8/2017. Patient was to follow-up in 1-2 months with dose increase of venlafaxine. Patient has no upcoming appointments.     Called and spoke to Patient after verifying last name and date of birth. Patient states \"I wasn't aware that he wanted to see me back in a couple of months.\" Writer offered to transfer her to scheduling line to set up appointment and she stated, \"I have different insurance now and need to see what they will cover.\"    Patient states she just got her Rx filled about 1 week ago, but there are no refills remaining. She is aware that Chaz Mcneill MD is out of the clinic and will return tomorrow.    Per [facility] protocol, Patient also must have a normal serum creatinine on file. CMP was ordered.     Patient is requesting a limited refill at this time. Please review and sign if appropriate.    Venlafaxine (EFFEXOR) 75 mg tablet    Last Written Prescription Date:  11/8/17  Last Fill Quantity: 90,   # refills: 0  Last Office Visit: 11/8/17  Future Office visit:   None    Routing refill request to provider for review/approval because:  Patient needs to follow-up with provider after dose increase    Unable to complete prescription refill per RN Medication Refill Policy. Shoshana Gonzalez RN .............. 3/5/2018  2:43 PM          "

## 2018-03-06 RX ORDER — VENLAFAXINE 75 MG/1
75 TABLET ORAL DAILY
Qty: 90 TABLET | Refills: 1 | Status: SHIPPED | OUTPATIENT
Start: 2018-03-06 | End: 2018-09-23

## 2018-03-25 ENCOUNTER — HEALTH MAINTENANCE LETTER (OUTPATIENT)
Age: 53
End: 2018-03-25

## 2018-07-16 ENCOUNTER — OFFICE VISIT (OUTPATIENT)
Dept: FAMILY MEDICINE | Facility: OTHER | Age: 53
End: 2018-07-16
Attending: NURSE PRACTITIONER
Payer: COMMERCIAL

## 2018-07-16 VITALS
SYSTOLIC BLOOD PRESSURE: 130 MMHG | BODY MASS INDEX: 28.25 KG/M2 | HEART RATE: 81 BPM | WEIGHT: 153.2 LBS | OXYGEN SATURATION: 95 % | TEMPERATURE: 98.2 F | DIASTOLIC BLOOD PRESSURE: 84 MMHG

## 2018-07-16 DIAGNOSIS — R06.2 WHEEZING: ICD-10-CM

## 2018-07-16 DIAGNOSIS — J20.9 ACUTE BRONCHITIS, UNSPECIFIED ORGANISM: Primary | ICD-10-CM

## 2018-07-16 DIAGNOSIS — R05.8 PRODUCTIVE COUGH: ICD-10-CM

## 2018-07-16 PROCEDURE — 99213 OFFICE O/P EST LOW 20 MIN: CPT | Performed by: NURSE PRACTITIONER

## 2018-07-16 RX ORDER — PREDNISONE 20 MG/1
40 TABLET ORAL DAILY
Qty: 6 TABLET | Refills: 0 | Status: SHIPPED | OUTPATIENT
Start: 2018-07-16 | End: 2018-07-19

## 2018-07-16 RX ORDER — AZITHROMYCIN 250 MG/1
TABLET, FILM COATED ORAL
Qty: 6 TABLET | Refills: 0 | Status: SHIPPED | OUTPATIENT
Start: 2018-07-16 | End: 2018-07-22

## 2018-07-16 RX ORDER — BENZONATATE 100 MG/1
100 CAPSULE ORAL 3 TIMES DAILY PRN
Qty: 42 CAPSULE | Refills: 0 | Status: SHIPPED | OUTPATIENT
Start: 2018-07-16 | End: 2018-07-25

## 2018-07-16 NOTE — MR AVS SNAPSHOT
"              After Visit Summary   7/16/2018    Puja Ocasio    MRN: [id number]           Patient Information     Date Of Birth          1965        Visit Information        Provider Department      7/16/2018 7:45 PM Falguni Benitez NP [facility]        Today's Diagnoses     Acute bronchitis, unspecified organism    -  1    Productive cough        Wheezing          Care Instructions    Prednisone daily x 3 days - take in the morning with food    Start Dulera inhaler twice daily    Use Albuterol inhaler every 4 hours as needed    Continue Mucinex     Tessalon 2 tabs at bedtime as needed.    Azithromycin daily x 5 days - start only IF symptoms are not improving or if worsening.  Do not start prior to 7/18/18          Follow-ups after your visit        Who to contact     If you have questions or need follow up information about today's clinic visit or your schedule please contact [facility] directly at [phone].  Normal or non-critical lab and imaging results will be communicated to you by [employer], letter or phone within 4 business days after the clinic has received the results. If you do not hear from us within 7 days, please contact the clinic through [employer] or phone. If you have a critical or abnormal lab result, we will notify you by phone as soon as possible.  Submit refill requests through [employer] or call your pharmacy and they will forward the refill request to us. Please allow 3 business days for your refill to be completed.          Additional Information About Your Visit        [employer] Information     Imp[employer]Pages lets you send messages to your doctor, view your test results, renew your prescriptions, schedule appointments and more. To sign up, go to [email or web address] . Click on \"Log in\" on the left side of the screen, which will take you to the Welcome page. Then click on \"Sign up Now\" on the right side of the page.     You will be asked to " enter the access code listed below, as well as some personal information. Please follow the directions to create your username and password.     Your access code is: [email or web address]  Expires: 10/14/2018  8:31 PM     Your access code will  in 90 days. If you need help or a new code, please call your [place] clinic or [phone].        Care EveryWhere ID     This is your Care EveryWhere ID. This could be used by other organizations to access your [place] medical records  [id number]        Your Vitals Were     Pulse Temperature Pulse Oximetry BMI (Body Mass Index)          81 98.2  F (36.8  C) (Tympanic) 95% 28.25 kg/m2         Blood Pressure from Last 3 Encounters:   18 130/84   17 118/72   17 122/78    Weight from Last 3 Encounters:   18 153 lb 3.2 oz (69.5 kg)   17 158 lb (71.7 kg)   17 152 lb (68.9 kg)              Today, you had the following     No orders found for display         Today's Medication Changes          These changes are accurate as of 18  8:31 PM.  If you have any questions, ask your nurse or doctor.               Start taking these medicines.        Dose/Directions    azithromycin 250 MG tablet   Commonly known as:  ZITHROMAX   Used for:  Acute bronchitis, unspecified organism   Started by:  Falguni Benitez NP        Two tablets first day, then one tablet daily for four days.   Quantity:  6 tablet   Refills:  0       benzonatate 100 MG capsule   Commonly known as:  TESSALON   Used for:  Acute bronchitis, unspecified organism   Started by:  Falguni Benitez NP        Dose:  100 mg   Take 1 capsule (100 mg) by mouth 3 times daily as needed   Quantity:  42 capsule   Refills:  0       predniSONE 20 MG tablet   Commonly known as:  DELTASONE   Used for:  Acute bronchitis, unspecified organism   Started by:  Falguni Benitez NP        Dose:  40 mg   Take 2 tablets (40 mg) by mouth daily for 3 days   Quantity:  6 tablet   Refills:  0            Where  to get your medicines      These medications were sent to [id number] - [place], [place] - [address]  [address] [address]     Phone:  [phone]     benzonatate 100 MG capsule    predniSONE 20 MG tablet         Some of these will need a paper prescription and others can be bought over the counter.  Ask your nurse if you have questions.     Bring a paper prescription for each of these medications     azithromycin 250 MG tablet                Primary Care Provider Office Phone # Fax #    Chaz Mcneill [phone]       [address]  [facility] [address]        Equal Access to Services     SHREYAS ESCOBAR : Hadii meredith johnsono Soterrie, waaxda luqadaha, qaybta kaalmada gabrielyajohnny, avel rosas. So [facility] [phone].    ATENCIÓN: Si habla español, tiene a thurman disposición servicios gratuitos de asistencia lingüística. Llame al [phone].    We comply with applicable federal civil rights laws and [place] laws. We do not discriminate on the basis of race, color, national origin, age, disability, sex, sexual orientation, or gender identity.            Thank you!     Thank you for choosing [facility] [place] [facility] AND [facility]  for your care. Our goal is always to provide you with excellent care. Hearing back from our patients is one way we can continue to improve our services. Please take a few minutes to complete the written survey that you may receive in the mail after your visit with us. Thank you!             Your Updated Medication List - Protect others around you: Learn how to safely use, store and throw away your medicines at www.disposemymeds.org.          This list is accurate as of 7/16/18  8:31 PM.  Always use your most recent med list.                   Brand Name Dispense Instructions for use Diagnosis    AEROCHAMBER MV Misc      For home use.        albuterol 108 (90 Base) MCG/ACT Inhaler     PROAIR HFA/PROVENTIL HFA/VENTOLIN HFA     Inhale 2 puffs into the lungs every 4 hours as needed        azithromycin 250 MG tablet    ZITHROMAX    6 tablet    Two tablets first day, then one tablet daily for four days.    Acute bronchitis, unspecified organism       benzonatate 100 MG capsule    TESSALON    42 capsule    Take 1 capsule (100 mg) by mouth 3 times daily as needed    Acute bronchitis, unspecified organism       EPINEPHrine 0.3 MG/0.3ML injection 2-pack    EPIPEN/ADRENACLICK/or ANY BX GENERIC EQUIV     Inject 0.3 mg into the muscle once as needed For allergic reaction        mometasone-formoterol 100-5 MCG/ACT oral inhaler    DULERA     Inhale 2 puffs into the lungs 2 times daily        predniSONE 20 MG tablet    DELTASONE    6 tablet    Take 2 tablets (40 mg) by mouth daily for 3 days    Acute bronchitis, unspecified organism       varenicline 1 MG tablet    CHANTIX     Take 1 mg by mouth daily with food        * venlafaxine 150 MG 24 hr capsule    EFFEXOR-XR     Take 150 mg by mouth daily (with dinner)        * venlafaxine 75 MG tablet    EFFEXOR    90 tablet    Take 1 tablet (75 mg) by mouth daily With the 150 mg capsule    Panic attack       * Notice:  This list has 2 medication(s) that are the same as other medications prescribed for you. Read the directions carefully, and ask your doctor or other care provider to review them with you.

## 2018-07-17 NOTE — NURSING NOTE
Patient present to clinic today with, head and chest congestion, loose cough, wheezy, post nasal drainage, sore throat, x 5 days.   Bailey Greer CMA..............7/16/2018........8:12 PM

## 2018-07-17 NOTE — PATIENT INSTRUCTIONS
Prednisone daily x 3 days - take in the morning with food    Start Dulera inhaler twice daily    Use Albuterol inhaler every 4 hours as needed    Continue Mucinex     Tessalon 2 tabs at bedtime as needed.    Azithromycin daily x 5 days - start only IF symptoms are not improving or if worsening.  Do not start prior to 7/18/18

## 2018-07-17 NOTE — PROGRESS NOTES
HPI:    Puja Ocasio is a 53 year old female  who presents to clinic today for URI.    Sore throat, nasal congestion and drainage, post nasal drainage, chest congestion and cough x 5 days.  Coughing up phlegm.  Chest tightness.  Feeling winded and shortness of breath at times.  Throat is painful with swallowing and generally sore.  No ear pain.  Generalized body aches.  No headaches.  Some sinus pressure.  Appetite decreased some.  Energy decreased.   No fevers.  Feels achy and chilled at times.  Stressed - her daughter is getting  this weekend.    Using Mucinex twice daily.    Daily smoker - 0.5 -0.75 ppd.  Dulera inhaler - not currently taking because she was not sure if she needed to be taking it and doesn't feel like she needs something every day when she is not sick.  Albuterol inhaler - using about 4 x day since getting sick            Past Medical History:   Diagnosis Date     Abnormal cytological findings in specimens from other organs, systems and tissues     HPV+ PAP -, normal paps ,  (no endo cells)     Epilepsy without status epilepticus, not intractable (H)     No Comments Provided     Gastritis without bleeding     06,treated      Other specified postprocedural states      (negative)     Tinea pedis     2010     Tobacco use     Attempts at cessation: electric cigarette, patches, chantix.     Past Surgical History:   Procedure Laterality Date     APPENDECTOMY OPEN      at age 16 for acute appendicitis      SECTION      x2     COLONOSCOPY      ,lian - reported NL     OTHER SURGICAL HISTORY      11/10,67516.0,IA LIGATE FALLOPIAN TUBE,uterine ablation, Dr. Kebede     Social History   Substance Use Topics     Smoking status: Current Every Day Smoker     Packs/day: 0.50     Years: 25.00     Types: Cigarettes     Smokeless tobacco: Never Used     Alcohol use Yes      Comment: Alcoholic Drinks/day: occasional     Current Outpatient Prescriptions    Medication Sig Dispense Refill     albuterol (PROAIR HFA/PROVENTIL HFA/VENTOLIN HFA) 108 (90 BASE) MCG/ACT Inhaler Inhale 2 puffs into the lungs every 4 hours as needed       EPINEPHrine (EPIPEN/ADRENACLICK/OR ANY BX GENERIC EQUIV) 0.3 MG/0.3ML injection 2-pack Inject 0.3 mg into the muscle once as needed For allergic reaction       mometasone-formoterol (DULERA) 100-5 MCG/ACT oral inhaler Inhale 2 puffs into the lungs 2 times daily       Spacer/Aero-Holding Chambers (AEROCHAMBER MV) MISC For home use.       varenicline (CHANTIX) 1 MG tablet Take 1 mg by mouth daily with food       venlafaxine (EFFEXOR) 75 MG tablet Take 1 tablet (75 mg) by mouth daily With the 150 mg capsule 90 tablet 1     venlafaxine (EFFEXOR-XR) 150 MG 24 hr capsule Take 150 mg by mouth daily (with dinner)       Allergies   Allergen Reactions     Tree Nuts  [Nuts] Anaphylaxis     Metronidazole Other (See Comments)     Pt can't remember.      Tetracycline Other (See Comments)     Pt can't remember.          Past medical history, past surgical history, current medications and allergies reviewed and accurate to the best of my knowledge.        ROS:  Refer to HPI    /84  Pulse 81  Temp 98.2  F (36.8  C) (Tympanic)  Wt 153 lb 3.2 oz (69.5 kg)  SpO2 95%  BMI 28.25 kg/m2    EXAM:  General Appearance: Well appearing adult female, appropriate appearance for age. No acute distress  Head: normocephalic, atraumatic  Ears: Left TM grey, translucent with bony landmarks appreciated, no erythema, no effusion, no bulging, no purulence.  Right TM grey, translucent with bony landmarks appreciated, no erythema, no effusion, no bulging, no purulence.  Left auditory canal clear.  Right auditory canal clear.  Normal external ears, non tender.  Eyes: conjunctivae normal without erythema or irritation, no drainage or crusting, no eyelid swelling, pupils equal   Orophayrnx: moist mucous membranes, posterior pharynx with mild erythema, tonsils without  hypertrophy, no erythema, no exudates or petechiae, no post nasal drip seen, no trismus.    Neck: supple without adenopathy  Respiratory: normal chest wall and respirations.  Normal effort.  Mild scattered wheezes and mild scattered rhonchi to auscultation bilaterally.  No increased work of breathing.  Course congested bronchial cough appreciated, oxygen saturation 95%  Cardiac: RRR with no murmurs  Musculoskeletal:  Normal gait.  Equal movement of bilateral upper extremities.  Equal movement of bilateral lower extremities.    Psychological: normal affect, alert and pleasant          ASSESSMENT/PLAN:    ICD-10-CM    1. Acute bronchitis, unspecified organism J20.9 predniSONE (DELTASONE) 20 MG tablet     benzonatate (TESSALON) 100 MG capsule     azithromycin (ZITHROMAX) 250 MG tablet   2. Productive cough R05    3. Wheezing R06.2          Prednisone 40 mg daily x 3 days - take with food    Discussed use of inhalers as patient has questions about when to use, etc    Start Dulera inhaler BID    Albuterol inhaler Q 4 hours PRN    Tessalon 100 mg TID PRN or 200 mg at bedtime PRN    Continue Mucinex BID PRN    Written Rx for Zpak - to be started only IF symptoms continue to worsen or do not improve with prednisone and Dulera inhaler.  Written not to be filled prior to 7/18/18    Symptomatic treatment -Encouraged fluids, saline nasal spray, salt water gargles, honey, elevation, humidifier, sinus rinse/netti pot, lozenges, etc     Tylenol or ibuprofen PRN    Discussed warning signs/symptoms indicative of need to f/u    Follow up if symptoms persist or worsen or concerns

## 2018-07-23 NOTE — PROGRESS NOTES
Patient Information     Patient Name  Puja Ocasio MRN  [id number] Sex  Female   1965      Letter by Chaz Mcneill MD at      Author:  Chaz Mcneill MD Service:  (none) Author Type:  (none)    Filed:   Encounter Date:  2017 Status:  (Other)           Puja Ocasio  [address]   [facility] [address]          2017    Dear Ms. Ocasio:    This is to remind you that you are coming due for your annual medication management appointment with Chaz Mcneill MD in relation to continued use of effexor.  Your last visit was on 16. Additional refills of your medication require you to complete this visit.    Please call [phone] to schedule your appointment.    Thank you for choosing [facility] for your health care needs.    Sincerely,      Refill RN  [facility]

## 2018-07-24 NOTE — PROGRESS NOTES
Patient Information     Patient Name  Puja Ocasio MRN  [id number] Sex  Female   1965      Letter by Chaz Mcneill MD at      Author:  Chaz Mcneill MD Service:  (none) Author Type:  (none)    Filed:   Date of Service:   Status:  (Other)         Puja Ocasio  [address]   [facility] [address]    2017      Dear Ms. Ocasio,      The radiologist reviewed your recent images and I think this is most likely the bursa and muscular/tendinous issues as we discussed.  Hopefully the injection resolved the pain, if not we should set you up for PT or you can come in and I can discuss other options.    I'm sending along the actual report so that you will have it for your general interest and  records.       Take Care,   Chaz Mcneill MD      Resulted Orders    XR KNEE WB 1 VIEW AP BILATERAL AND 2 VIEWS LEFT (Exam End: 2017  8:04 AM)     Narrative    XR KNEE WB 1 VIEW AP BILATERAL AND 2 VIEWS LEFT    HISTORY: 51 yearsFemale Chronic obstructive pulmonary disease, unspecified COPD type (HC)    TECHNIQUE: Left knee 3 views    COMPARISON: None    FINDINGS: Joint spaces are congruent. Articular surfaces are smooth. There is no evidence of fracture or dislocation.    IMPRESSION: No evidence of fracture or dislocation of the left knee. There is no significant osteoarthritic change.    Electronically Signed By: Radhames Valdovinos M.D. on 2017 8:32 AM

## 2018-07-24 NOTE — PROGRESS NOTES
Patient Information     Patient Name  Puja Ocasio MRN  [id number] Sex  Female   1965      Letter by Chaz Mcneill MD at      Author:  Chaz Mcneill MD Service:  (none) Author Type:  (none)    Filed:   Encounter Date:  2017 Status:  (Other)       Puja Ocasio  [address]  [address]   [facility] [address]    2017      Dear Ms. Ocasio,    Your lab results are listed below and are acceptable/stable.  No evidence of diabetes, cholesterol is a little high but not bad.  You should really try to focus on cutting down simple carbohydrates and any saturated fats.  Healthy omega 3s from sources like salmon and tuna may be helpful as well.  Your 10y risk of heart attack remains low at <5% but we should recheck your cholesterol annually and stay away from smoking.  Please continue on your current medications.    Contact us with any questions.    I'm sending along your actual numbers so that you will have them for your general interest and your records.       Take Care,   Chaz Mcneill MD      Resulted Orders      LIPID PANEL (Collected: 2017 10:05 AM)      Result  Value Ref Range    CHOLESTEROL,TOTAL 236 (H) <200 mg/dL    TRIGLYCERIDES 108 <150 mg/dL    HDL CHOLESTEROL 69 23 - 92 mg/dL    NON-HDL CHOLESTEROL 167 (H) <145 mg/dl    CHOL/HDL RATIO            3.42 <4.50                    LDL CHOLESTEROL 145 (H) <100 mg/dL    PROVIDER ORDERED STATUS FASTING    COMP METABOLIC PANEL (Collected: 2017 10:05 AM)      Result  Value Ref Range    SODIUM 143 133 - 143 mmol/L    POTASSIUM 4.3 3.5 - 5.1 mmol/L    CHLORIDE 101 98 - 107 mmol/L    CO2,TOTAL 26 21 - 31 mmol/L    ANION GAP 16 5 - 18                    GLUCOSE 93 70 - 105 mg/dL    CALCIUM 9.9 8.6 - 10.3 mg/dL    BUN 19 7 - 25 mg/dL    CREATININE 0.65 (L) 0.70 - 1.30 mg/dL    BUN/CREAT RATIO           29                    GFR if African American >60 >60 ml/min/1.73m2    GFR if not  >60 >60  ml/min/1.73m2    ALBUMIN 4.2 3.5 - 5.7 g/dL    PROTEIN,TOTAL 7.2 6.4 - 8.9 g/dL    GLOBULIN                  3.0 2.0 - 3.7 g/dL    A/G RATIO 1.4 1.0 - 2.0                    BILIRUBIN,TOTAL 0.6 0.3 - 1.0 mg/dL    ALK PHOSPHATASE 67 34 - 104 IU/L    ALT (SGPT) 13 7 - 52 IU/L    AST (SGOT) 18 13 - 39 IU/L

## 2018-08-01 ENCOUNTER — OFFICE VISIT (OUTPATIENT)
Dept: FAMILY MEDICINE | Facility: OTHER | Age: 53
End: 2018-08-01
Attending: NURSE PRACTITIONER
Payer: COMMERCIAL

## 2018-08-01 VITALS
HEART RATE: 84 BPM | DIASTOLIC BLOOD PRESSURE: 80 MMHG | BODY MASS INDEX: 28.76 KG/M2 | SYSTOLIC BLOOD PRESSURE: 122 MMHG | WEIGHT: 156 LBS

## 2018-08-01 DIAGNOSIS — J44.1 COPD EXACERBATION (H): Primary | ICD-10-CM

## 2018-08-01 PROCEDURE — 99214 OFFICE O/P EST MOD 30 MIN: CPT | Performed by: NURSE PRACTITIONER

## 2018-08-01 RX ORDER — PREDNISONE 20 MG/1
TABLET ORAL
Qty: 20 TABLET | Refills: 0 | Status: SHIPPED | OUTPATIENT
Start: 2018-08-01 | End: 2018-08-12

## 2018-08-01 RX ORDER — LEVOFLOXACIN 500 MG/1
500 TABLET, FILM COATED ORAL DAILY
Qty: 7 TABLET | Refills: 0 | Status: SHIPPED | OUTPATIENT
Start: 2018-08-01 | End: 2018-08-07

## 2018-08-01 ASSESSMENT — ANXIETY QUESTIONNAIRES
3. WORRYING TOO MUCH ABOUT DIFFERENT THINGS: NOT AT ALL
6. BECOMING EASILY ANNOYED OR IRRITABLE: NOT AT ALL
1. FEELING NERVOUS, ANXIOUS, OR ON EDGE: NOT AT ALL
5. BEING SO RESTLESS THAT IT IS HARD TO SIT STILL: NOT AT ALL
GAD7 TOTAL SCORE: 0
4. TROUBLE RELAXING: NOT AT ALL
IF YOU CHECKED OFF ANY PROBLEMS ON THIS QUESTIONNAIRE, HOW DIFFICULT HAVE THESE PROBLEMS MADE IT FOR YOU TO DO YOUR WORK, TAKE CARE OF THINGS AT HOME, OR GET ALONG WITH OTHER PEOPLE: NOT DIFFICULT AT ALL
2. NOT BEING ABLE TO STOP OR CONTROL WORRYING: NOT AT ALL
7. FEELING AFRAID AS IF SOMETHING AWFUL MIGHT HAPPEN: NOT AT ALL

## 2018-08-01 ASSESSMENT — PAIN SCALES - GENERAL: PAINLEVEL: NO PAIN (0)

## 2018-08-01 NOTE — MR AVS SNAPSHOT
After Visit Summary   8/1/2018    Puja Ocasio    MRN: [id number]           Patient Information     Date Of Birth          1965        Visit Information        Provider Department      8/1/2018 3:15 PM Elizabeth Berumen CNP [facility]        Today's Diagnoses     COPD exacerbation (H)    -  1      Care Instructions    ASSESSMENT/PLAN:     1. COPD exacerbation (H)  Acute, Symptomatic  - predniSONE (DELTASONE) 20 MG tablet; Take 3 tabs (60 mg) by mouth daily x 3 days, 2 tabs (40 mg) daily x 3 days, 1 tab (20 mg) daily x 3 days, then 1/2 tab (10 mg) x 3 days.  Dispense: 20 tablet; Refill: 0  - levofloxacin (LEVAQUIN) 500 MG tablet; Take 1 tablet (500 mg) by mouth daily  Dispense: 7 tablet; Refill: 0  - Continue with Dulera inhaler twice daily for at least 1 month before stopping.  - You can try an OTC antihistamine such as Zyrtec, Claritin, Allegra (generic is okay) to see if this helps.  - Smoking cessation!      FUTURE APPOINTMENTS:       - Follow-up visit: If no improvement or worsening symptoms.    Elizabeth Berumen CNP  [facility]          Follow-ups after your visit        Who to contact     If you have questions or need follow up information about today's clinic visit or your schedule please contact [facility] directly at [phone].  Normal or non-critical lab and imaging results will be communicated to you by MyChart, letter or phone within 4 business days after the clinic has received the results. If you do not hear from us within 7 days, please contact the clinic through [employer] or phone. If you have a critical or abnormal lab result, we will notify you by phone as soon as possible.  Submit refill requests through [employer] or call your pharmacy and they will forward the refill request to us. Please allow 3 business days for your refill to be completed.          Additional Information About Your Visit        MyChart  "Information     WeDidIt lets you send messages to your doctor, view your test results, renew your prescriptions, schedule appointments and more. To sign up, go to [email or web address] . Click on \"Log in\" on the left side of the screen, which will take you to the Welcome page. Then click on \"Sign up Now\" on the right side of the page.     You will be asked to enter the access code listed below, as well as some personal information. Please follow the directions to create your username and password.     Your access code is: [email or web address]  Expires: 10/14/2018  8:31 PM     Your access code will  in 90 days. If you need help or a new code, please call your [facility] clinic or [phone].        Care EveryWhere ID     This is your Care EveryWhere ID. This could be used by other organizations to access your [facility] medical records  [id number]        Your Vitals Were     Pulse BMI (Body Mass Index)                84 28.76 kg/m2           Blood Pressure from Last 3 Encounters:   18 122/80   18 130/84   17 118/72    Weight from Last 3 Encounters:   18 156 lb (70.8 kg)   18 153 lb 3.2 oz (69.5 kg)   17 158 lb (71.7 kg)              Today, you had the following     No orders found for display         Today's Medication Changes          These changes are accurate as of 18  3:40 PM.  If you have any questions, ask your nurse or doctor.               Start taking these medicines.        Dose/Directions    levofloxacin 500 MG tablet   Commonly known as:  LEVAQUIN   Used for:  COPD exacerbation (H)   Started by:  Elizabeth Berumen CNP        Dose:  500 mg   Take 1 tablet (500 mg) by mouth daily   Quantity:  7 tablet   Refills:  0       predniSONE 20 MG tablet   Commonly known as:  DELTASONE   Used for:  COPD exacerbation (H)   Started by:  Elizabeth Berumen CNP        Take 3 tabs (60 mg) by mouth daily x 3 days, 2 tabs (40 mg) daily x 3 days, 1 tab (20 mg) daily x 3 days, then 1/2 " tab (10 mg) x 3 days.   Quantity:  20 tablet   Refills:  0            Where to get your medicines      These medications were sent to [id number] - [place], [place] - [address]  [address], [facility] [address]     Phone:  [phone]     levofloxacin 500 MG tablet    predniSONE 20 MG tablet                Primary Care Provider Office Phone # Fax #    Chaz Mcneill [phone] [address]  [facility] [address]        Equal Access to Services     [facility]: Hadii aad ku hadasho Soomaali, waaxda luqadaha, qaybta kaalmada adeegyada, waxmorro bowman hay[address] lane . So [facility] [phone].    ATENCIÓN: Si habla español, tiene a thurman disposición servicios gratuitos de asistencia lingüística. [facility] [phone].    We comply with applicable federal civil rights laws and [place] laws. We do not discriminate on the basis of race, color, national origin, age, disability, sex, sexual orientation, or gender identity.            Thank you!     Thank you for choosing [facility]  for your care. Our goal is always to provide you with excellent care. Hearing back from our patients is one way we can continue to improve our services. Please take a few minutes to complete the written survey that you may receive in the mail after your visit with us. Thank you!             Your Updated Medication List - Protect others around you: Learn how to safely use, store and throw away your medicines at www.disposemymeds.org.          This list is accurate as of 8/1/18  3:40 PM.  Always use your most recent med list.                   Brand Name Dispense Instructions for use Diagnosis    AEROCHAMBER MV Misc      For home use.        albuterol 108 (90 Base) MCG/ACT Inhaler    PROAIR HFA/PROVENTIL HFA/VENTOLIN HFA     Inhale 2 puffs into the lungs every 4 hours as needed        azithromycin 250 MG tablet    ZITHROMAX    6  tablet    Two tablets first day, then one tablet daily for four days.    Acute bronchitis, unspecified organism       benzonatate 100 MG capsule    TESSALON    42 capsule    Take 1 capsule (100 mg) by mouth 3 times daily as needed    Acute bronchitis, unspecified organism       EPINEPHrine 0.3 MG/0.3ML injection 2-pack    EPIPEN/ADRENACLICK/or ANY BX GENERIC EQUIV     Inject 0.3 mg into the muscle once as needed For allergic reaction        levofloxacin 500 MG tablet    LEVAQUIN    7 tablet    Take 1 tablet (500 mg) by mouth daily    COPD exacerbation (H)       mometasone-formoterol 100-5 MCG/ACT oral inhaler    DULERA     Inhale 2 puffs into the lungs 2 times daily        predniSONE 20 MG tablet    DELTASONE    20 tablet    Take 3 tabs (60 mg) by mouth daily x 3 days, 2 tabs (40 mg) daily x 3 days, 1 tab (20 mg) daily x 3 days, then 1/2 tab (10 mg) x 3 days.    COPD exacerbation (H)       varenicline 1 MG tablet    CHANTIX     Take 1 mg by mouth daily with food        * venlafaxine 150 MG 24 hr capsule    EFFEXOR-XR     Take 150 mg by mouth daily (with dinner)        * venlafaxine 75 MG tablet    EFFEXOR    90 tablet    Take 1 tablet (75 mg) by mouth daily With the 150 mg capsule    Panic attack       * Notice:  This list has 2 medication(s) that are the same as other medications prescribed for you. Read the directions carefully, and ask your doctor or other care provider to review them with you.

## 2018-08-01 NOTE — NURSING NOTE
Patient presents to the clinic for possible bronchitis. Patient states she was on a z-pack but took the medication 2 tabs per day for 3 days, instead of the 2 pills the first day then 1 daily for 4 days.  Carlos Gonzalez ..............8/1/2018 3:18 PM

## 2018-08-01 NOTE — PROGRESS NOTES
SUBJECTIVE:   Puja Ocasio is a 53 year old female who presents to clinic today for the following health issues:    ED/UC Followup:    Facility: [facility]  Date of visit: 2018  Reason for visit: She went to the [facility] clinic for sore throat nasal congestion and drainage postnasal drainage chest congestion and cough at that time she was coughing up phlegm.  She was having chest tightness feeling winded and short of breath.  She was diagnosed with bronchitis.  She was given prednisone 20 mg for 3 days and azithromycin ×5 days she was also instructed to start Dulera inhaler twice daily.  Current Status: Completed steroid and azithromycin treatment given in the [facility].  Tolerated without side effects.  Started feeling really good but in the last few days has started feeling the exact same as when it started. Chills, achey. Denies coughing, rhinorrhea. Throat a little sore. Denies eyes symptoms. Ears hurt a little bit. Takes Advil and  Mucinex and symptoms improve. Feels wheezy.  She is a half a pack to 1 pack per day smoker.  Reviewing her history shows asthma and COPD.  She is uncertain where her asthma diagnoses came from.  She had pulmonary function test done in  that showed severe obstructive disease.  She has not been using Dulera inhaler that was given in the [facility] clinic, sometimes uses albuterol inhaler but rarely.  Has not tried albuterol inhaler to see if this gives relief of symptoms.             Problem list and histories reviewed & adjusted, as indicated.  Additional history: as documented    Patient Active Problem List   Diagnosis     Allergic rhinitis     Asthma     Panic attack     Tobacco use disorder     Past Surgical History:   Procedure Laterality Date     APPENDECTOMY OPEN      at age 16 for acute appendicitis      SECTION      x2     COLONOSCOPY      ,lian - reported NL     OTHER SURGICAL HISTORY      11/10,61683.0,AL LIGATE FALLOPIAN TUBE,uterine  Dr. Yandy brooks       Social History   Substance Use Topics     Smoking status: Current Every Day Smoker     Packs/day: 0.50     Years: 25.00     Types: Cigarettes     Smokeless tobacco: Never Used     Alcohol use Yes      Comment: Alcoholic Drinks/day: occasional     Family History   Problem Relation Age of Onset     Colon Cancer Mother      Cancer-colon     Prostate Cancer Father      Cancer-prostate     HEART DISEASE Other      Heart Disease     Other - See Comments Other      COPD/Emphysema     Other - See Comments Other      Stroke     Breast Cancer Maternal Aunt      Cancer-breast         Current Outpatient Prescriptions   Medication Sig Dispense Refill     albuterol (PROAIR HFA/PROVENTIL HFA/VENTOLIN HFA) 108 (90 BASE) MCG/ACT Inhaler Inhale 2 puffs into the lungs every 4 hours as needed       azithromycin (ZITHROMAX) 250 MG tablet Two tablets first day, then one tablet daily for four days. 6 tablet 0     benzonatate (TESSALON) 100 MG capsule Take 1 capsule (100 mg) by mouth 3 times daily as needed 42 capsule 0     EPINEPHrine (EPIPEN/ADRENACLICK/OR ANY BX GENERIC EQUIV) 0.3 MG/0.3ML injection 2-pack Inject 0.3 mg into the muscle once as needed For allergic reaction       mometasone-formoterol (DULERA) 100-5 MCG/ACT oral inhaler Inhale 2 puffs into the lungs 2 times daily       Spacer/Aero-Holding Chambers (AEROCHAMBER MV) MISC For home use.       varenicline (CHANTIX) 1 MG tablet Take 1 mg by mouth daily with food       venlafaxine (EFFEXOR) 75 MG tablet Take 1 tablet (75 mg) by mouth daily With the 150 mg capsule 90 tablet 1     venlafaxine (EFFEXOR-XR) 150 MG 24 hr capsule Take 150 mg by mouth daily (with dinner)       Allergies   Allergen Reactions     Tree Nuts  [Nuts] Anaphylaxis     Metronidazole Other (See Comments)     Pt can't remember.      Tetracycline Other (See Comments)     Pt can't remember.      Recent Labs   Lab Test  11/08/17   1154  10/21/16   2132   LDL  145*   --    HDL  69   --     TRIG  108   --    CR  0.65*  0.89   GFRESTBLACK  >60  >60   POTASSIUM  4.3  3.6      BP Readings from Last 3 Encounters:   08/01/18 122/80   07/16/18 130/84   11/08/17 118/72    Wt Readings from Last 3 Encounters:   08/01/18 156 lb (70.8 kg)   07/16/18 153 lb 3.2 oz (69.5 kg)   11/08/17 158 lb (71.7 kg)                    Reviewed and updated as needed this visit by clinical staff  Tobacco  Allergies  Meds  Med Hx  Surg Hx  Fam Hx  Soc Hx      Reviewed and updated as needed this visit by Provider         ROS:    CONSTITUTIONAL: Positive for feeling feverish and chilled but has not checked her temperature  INTEGUMENTARY/SKIN: NEGATIVE for worrisome rashes, moles or lesions  EYES: NEGATIVE for vision changes or irritation  ENT/MOUTH: NEGATIVE for ear, mouth and throat problems  RESP: Positive for wheezing shortness of breath  CV: Positive for chest tightness, denies chest pain  MUSCULOSKELETAL: Positive for body aches    OBJECTIVE:     /80 (BP Location: Right arm, Patient Position: Sitting, Cuff Size: Adult Large)  Pulse 84  Wt 156 lb (70.8 kg)  BMI 28.76 kg/m2  Body mass index is 28.76 kg/(m^2).     GENERAL: healthy, alert and no distress  EYES: Eyes grossly normal to inspection, PERRLA and conjunctivae and sclerae normal  HENT: ear canals and TM's normal, nose and mouth without ulcers or lesions  NECK: no adenopathy, no asymmetry, masses, or scars and thyroid normal to palpation  RESP: Inspiratory and expiratory wheezes throughout posterior and upper anterior lung fields.  Audible wheezing at times  CV: regular rate and rhythm, normal S1 S2, no S3 or S4, no murmur, click or rub  MS: no gross musculoskeletal defects noted, no edema  NEURO: Normal strength and tone, mentation intact and speech normal  PSYCH: mentation appears normal, affect normal    Diagnostic Test Results:  none     ASSESSMENT/PLAN:     1. COPD exacerbation (H)  Acute, Symptomatic.  Discussed obtaining chest x-ray today however  explained to her that given her history of air-trapping and severe obstructive disease on pulmonary function testing along with HPI and clinical findings today I would not suspect a change to be shown on her chest x-ray.  She would prefer to not have a chest x-ray at this time after discussing this.  Education provided on pathophysiology of COPD and treatment recommendations.  - predniSONE (DELTASONE) 20 MG tablet; Take 3 tabs (60 mg) by mouth daily x 3 days, 2 tabs (40 mg) daily x 3 days, 1 tab (20 mg) daily x 3 days, then 1/2 tab (10 mg) x 3 days.  Dispense: 20 tablet; Refill: 0  - levofloxacin (LEVAQUIN) 500 MG tablet; Take 1 tablet (500 mg) by mouth daily  Dispense: 7 tablet; Refill: 0  - Continue with Dulera inhaler twice daily for at least 1 month before stopping.  - You can try an OTC antihistamine such as Zyrtec, Claritin, Allegra (generic is okay) to see if this helps.  - Smoking cessation encouraged.  Education provided on how smoking impacts COPD.  Education also provided on how Dulera inhaler can help prevent future exacerbations and minimize need for albuterol inhaler although she has not tried to use this for her symptoms.    Differentials include asthma exacerbation, viral infection, pneumonia, allergic rhinosinusitis.    FUTURE APPOINTMENTS:       - Follow-up visit: If no improvement or worsening symptoms.    Elizabeth Berumen CNP  [facility]

## 2018-08-01 NOTE — PATIENT INSTRUCTIONS
ASSESSMENT/PLAN:     1. COPD exacerbation (H)  Acute, Symptomatic  - predniSONE (DELTASONE) 20 MG tablet; Take 3 tabs (60 mg) by mouth daily x 3 days, 2 tabs (40 mg) daily x 3 days, 1 tab (20 mg) daily x 3 days, then 1/2 tab (10 mg) x 3 days.  Dispense: 20 tablet; Refill: 0  - levofloxacin (LEVAQUIN) 500 MG tablet; Take 1 tablet (500 mg) by mouth daily  Dispense: 7 tablet; Refill: 0  - Continue with Dulera inhaler twice daily for at least 1 month before stopping.  - You can try an OTC antihistamine such as Zyrtec, Claritin, Allegra (generic is okay) to see if this helps.  - Smoking cessation!      FUTURE APPOINTMENTS:       - Follow-up visit: If no improvement or worsening symptoms.    Elizabeth Berumen CNP  [facility] [place] [facility]

## 2018-08-02 ASSESSMENT — ANXIETY QUESTIONNAIRES: GAD7 TOTAL SCORE: 0

## 2018-08-02 ASSESSMENT — PATIENT HEALTH QUESTIONNAIRE - PHQ9: SUM OF ALL RESPONSES TO PHQ QUESTIONS 1-9: 0

## 2018-09-23 ENCOUNTER — TELEPHONE (OUTPATIENT)
Dept: FAMILY MEDICINE | Facility: OTHER | Age: 53
End: 2018-09-23

## 2018-09-23 DIAGNOSIS — F41.0 PANIC ATTACK: ICD-10-CM

## 2018-09-23 NOTE — LETTER
September 27, 2018      Puja Ocasio  [address] [facility] [address]        Dear Puja,     This letter is to remind you that you are due for your annual medication management appointment and labs.    A LIMITED 2-month refill of     VENLAFAXINE 75MG TABLETS    has been called into your pharmacy. Additional refills require you to complete this appointment.    Please call the clinic at [phone] to schedule your appointment.    If you should require additional refills before your scheduled appointment, please contact your pharmacy and we will refill your medication until the date of your appointment.    If you are no longer seeing Chaz Mcneill for primary care, please call to let us know. Doing so will remove you from our call/contact list.      Thank you for choosing [facility] and [facility] for your health care needs.    Sincerely,    Refill RN  [facility]

## 2018-09-26 RX ORDER — VENLAFAXINE 75 MG/1
TABLET ORAL
Qty: 60 TABLET | Refills: 0 | Status: SHIPPED | OUTPATIENT
Start: 2018-09-26 | End: 2018-10-23

## 2018-09-26 NOTE — TELEPHONE ENCOUNTER
I refilled for 2 months to allow her time to follow-up in the clinic on medications. It looks like her last follow-up for medications was in November last year.  Elizabeth Berumen CNP on 9/26/2018 at 1:09 PM

## 2018-09-26 NOTE — TELEPHONE ENCOUNTER
In clinical absence of patient's primary, Chaz Mcneill, patient is requesting that this message be sent to the primary provider's Teamlet for consideration please.  Dr. Mcneill is out of the clinic the rest of the week, rounding.    [facility] sent Rx request for the following:  VENLAFAXINE 75MG TABLETS  TAKE ONE TABLET BY MOUTH EVERY DAY WITH 150MG TABSULE  Last Written Prescription Date:  3/6/18  Last Fill Quantity: 90,   # refills: 1  Last Office Visit: 8/1/18  Future Office visit:  None.   Routing refill request to provider for review/approval because:  Serotonin-Norepinephrine Reuptake Inhibitors Failed9/26 9:19 AM   Normal serum creatinine on file in past 12 months     Creatinine 0.65 (L) 0.70 - 1.30 mg/dL  [facility]     CMP lab pending MD co-sign. If approved, please notify Patient or address at next OV.     Unable to complete prescription refill per RN Medication Refill Policy. Shoshana Gonzalez RN .............. 9/26/2018  9:22 AM              12/30

## 2018-09-26 NOTE — TELEPHONE ENCOUNTER
Attempted reaching Patient to notify her of this information. Left message on machine to call back. Shoshana Gonzalez RN .............. 9/26/2018  1:28 PM

## 2018-09-27 NOTE — TELEPHONE ENCOUNTER
Patient returned call and was notified of this information. The patient indicates understanding of these issues and agrees with the plan. Shoshana Gonzalez RN .............. 9/27/2018  3:53 PM

## 2018-09-27 NOTE — TELEPHONE ENCOUNTER
Unable to reach Patient. Letter sent informing her of limited refill and provider recommendations. Shoshana Gonzalez RN .............. 9/27/2018  9:36 AM

## 2018-10-11 ENCOUNTER — TELEPHONE (OUTPATIENT)
Dept: FAMILY MEDICINE | Facility: OTHER | Age: 53
End: 2018-10-11

## 2018-10-11 DIAGNOSIS — F41.0 PANIC ATTACK: ICD-10-CM

## 2018-10-11 ASSESSMENT — ANXIETY QUESTIONNAIRES
1. FEELING NERVOUS, ANXIOUS, OR ON EDGE: NOT AT ALL
2. NOT BEING ABLE TO STOP OR CONTROL WORRYING: NOT AT ALL
3. WORRYING TOO MUCH ABOUT DIFFERENT THINGS: NOT AT ALL
GAD7 TOTAL SCORE: 0
6. BECOMING EASILY ANNOYED OR IRRITABLE: NOT AT ALL
5. BEING SO RESTLESS THAT IT IS HARD TO SIT STILL: NOT AT ALL
IF YOU CHECKED OFF ANY PROBLEMS ON THIS QUESTIONNAIRE, HOW DIFFICULT HAVE THESE PROBLEMS MADE IT FOR YOU TO DO YOUR WORK, TAKE CARE OF THINGS AT HOME, OR GET ALONG WITH OTHER PEOPLE: NOT DIFFICULT AT ALL
7. FEELING AFRAID AS IF SOMETHING AWFUL MIGHT HAPPEN: NOT AT ALL

## 2018-10-11 ASSESSMENT — PATIENT HEALTH QUESTIONNAIRE - PHQ9: 5. POOR APPETITE OR OVEREATING: NOT AT ALL

## 2018-10-11 NOTE — TELEPHONE ENCOUNTER
Patient states that she is doing great on her medication.  I did the screening questions.  Can she get refills for the year, not just a couple months?  She is okay to wait for Dr. Mcneill's return.  Dulce Maria Spencer LPN .......10/11/2018 3:37 PM

## 2018-10-11 NOTE — TELEPHONE ENCOUNTER
Pt called and was told she had to make appt after these 2 months of Effexor---She use to do yearly refills--She was upped 50 mg.on this medication but was in for bronchitis and discussed they were working good--so feels she shouldn't have only rec'd 2 months and being made to come back in again. Also doesn't have Ins.to come back in again--this was already discussed that it was working great at last appt. Can send for her regular refill amt.or call patient.

## 2018-10-12 ASSESSMENT — PATIENT HEALTH QUESTIONNAIRE - PHQ9: SUM OF ALL RESPONSES TO PHQ QUESTIONS 1-9: 0

## 2018-10-12 ASSESSMENT — ANXIETY QUESTIONNAIRES: GAD7 TOTAL SCORE: 0

## 2018-10-17 NOTE — TELEPHONE ENCOUNTER
It looks like she was recently evaluated by another provider who suggested she follow up in clinic for medication refills.

## 2018-10-17 NOTE — TELEPHONE ENCOUNTER
Patient has enough medications to last until the end of November. She made an appointment with dr Mcneill for Nov 6 but states she has a new job and will not have insurance until January. She will keep the Nov 6 appointment unless Dr Mcneill is willing to fill both of her Effexor prescriptions until January when she will hopefully have insurance again.

## 2018-10-23 RX ORDER — VENLAFAXINE HYDROCHLORIDE 150 MG/1
150 CAPSULE, EXTENDED RELEASE ORAL
Qty: 90 CAPSULE | Refills: 0 | Status: SHIPPED | OUTPATIENT
Start: 2018-10-23 | End: 2019-01-22

## 2018-10-23 RX ORDER — VENLAFAXINE 75 MG/1
TABLET ORAL
Qty: 90 TABLET | Refills: 0 | Status: SHIPPED | OUTPATIENT
Start: 2018-10-23 | End: 2019-01-22

## 2018-10-23 NOTE — TELEPHONE ENCOUNTER
Please do a PHQ9 over the phone.  I did refill both for 90d.  This should last until 1/23.  I would suggest she get an appointment scheduled for January now, while we have plenty of openings.

## 2018-11-26 DIAGNOSIS — F41.0 PANIC ATTACK: ICD-10-CM

## 2018-11-28 RX ORDER — VENLAFAXINE 75 MG/1
TABLET ORAL
Qty: 60 TABLET | Refills: 0 | OUTPATIENT
Start: 2018-11-28

## 2018-11-28 NOTE — TELEPHONE ENCOUNTER
Redundant refill request refused: Too soon:    venlafaxine (EFFEXOR) 75 MG tablet 90 tablet 0 10/23/2018  No   Sig: TAKE ONE TABLET BY MOUTH EVERY DAY WITH 150MG TABSULE   Class: E-Prescribe   Order: [id number]   E-Prescribing Status: Receipt confirmed by pharmacy (10/23/2018  9:33 AM CDT)     [id number] - [place], [place] - [address]     Unable to complete prescription refill per RN Medication Refill Policy. Shoshana Gonzalez RN .............. 11/28/2018  9:45 AM

## 2019-01-22 ENCOUNTER — OFFICE VISIT (OUTPATIENT)
Dept: FAMILY MEDICINE | Facility: OTHER | Age: 54
End: 2019-01-22
Attending: FAMILY MEDICINE

## 2019-01-22 VITALS
DIASTOLIC BLOOD PRESSURE: 82 MMHG | HEIGHT: 62 IN | BODY MASS INDEX: 28.71 KG/M2 | WEIGHT: 156 LBS | HEART RATE: 76 BPM | RESPIRATION RATE: 16 BRPM | SYSTOLIC BLOOD PRESSURE: 120 MMHG | TEMPERATURE: 98 F

## 2019-01-22 DIAGNOSIS — Z71.6 ENCOUNTER FOR TOBACCO USE CESSATION COUNSELING: ICD-10-CM

## 2019-01-22 DIAGNOSIS — J45.40 MODERATE PERSISTENT ASTHMA, UNSPECIFIED WHETHER COMPLICATED: ICD-10-CM

## 2019-01-22 DIAGNOSIS — F41.0 PANIC ATTACK: ICD-10-CM

## 2019-01-22 DIAGNOSIS — J44.1 CHRONIC OBSTRUCTIVE PULMONARY DISEASE WITH ACUTE EXACERBATION (H): Primary | ICD-10-CM

## 2019-01-22 PROCEDURE — 99213 OFFICE O/P EST LOW 20 MIN: CPT | Performed by: FAMILY MEDICINE

## 2019-01-22 RX ORDER — ALBUTEROL SULFATE 90 UG/1
2 AEROSOL, METERED RESPIRATORY (INHALATION) EVERY 4 HOURS PRN
Qty: 8.5 G | Refills: 3 | Status: SHIPPED | OUTPATIENT
Start: 2019-01-22 | End: 2019-10-24

## 2019-01-22 RX ORDER — VENLAFAXINE HYDROCHLORIDE 150 MG/1
150 CAPSULE, EXTENDED RELEASE ORAL
Qty: 90 CAPSULE | Refills: 3 | Status: SHIPPED | OUTPATIENT
Start: 2019-01-22 | End: 2020-01-31

## 2019-01-22 ASSESSMENT — PATIENT HEALTH QUESTIONNAIRE - PHQ9
SUM OF ALL RESPONSES TO PHQ QUESTIONS 1-9: 6
5. POOR APPETITE OR OVEREATING: SEVERAL DAYS

## 2019-01-22 ASSESSMENT — ANXIETY QUESTIONNAIRES
6. BECOMING EASILY ANNOYED OR IRRITABLE: SEVERAL DAYS
5. BEING SO RESTLESS THAT IT IS HARD TO SIT STILL: NOT AT ALL
7. FEELING AFRAID AS IF SOMETHING AWFUL MIGHT HAPPEN: NOT AT ALL
2. NOT BEING ABLE TO STOP OR CONTROL WORRYING: SEVERAL DAYS
3. WORRYING TOO MUCH ABOUT DIFFERENT THINGS: NOT AT ALL
GAD7 TOTAL SCORE: 6
1. FEELING NERVOUS, ANXIOUS, OR ON EDGE: NEARLY EVERY DAY
IF YOU CHECKED OFF ANY PROBLEMS ON THIS QUESTIONNAIRE, HOW DIFFICULT HAVE THESE PROBLEMS MADE IT FOR YOU TO DO YOUR WORK, TAKE CARE OF THINGS AT HOME, OR GET ALONG WITH OTHER PEOPLE: SOMEWHAT DIFFICULT

## 2019-01-22 ASSESSMENT — MIFFLIN-ST. JEOR: SCORE: 1265.86

## 2019-01-22 NOTE — NURSING NOTE
No LMP recorded. Patient has had an ablation.  Medication Reconciliation: complete    Krista Muse LPN  1/22/2019 4:09 PM

## 2019-01-23 DIAGNOSIS — F41.0 PANIC ATTACK: ICD-10-CM

## 2019-01-23 ASSESSMENT — ANXIETY QUESTIONNAIRES: GAD7 TOTAL SCORE: 6

## 2019-01-24 RX ORDER — VENLAFAXINE HYDROCHLORIDE 150 MG/1
CAPSULE, EXTENDED RELEASE ORAL
Qty: 90 CAPSULE | Refills: 0 | OUTPATIENT
Start: 2019-01-24

## 2019-01-24 NOTE — TELEPHONE ENCOUNTER
Redundant refill request refused: Too soon:    venlafaxine (EFFEXOR-XR) 150 MG 24 hr capsule 90 capsule 3 1/22/2019  No   Sig - Route: Take 1 capsule (150 mg) by mouth daily (with dinner) [facility] please - Oral   Sent to pharmacy as: venlafaxine (EFFEXOR-XR) 150 MG 24 hr capsule   Class: E-Prescribe   Order: [id number]   E-Prescribing Status: Receipt confirmed by pharmacy (1/22/2019  4:20 PM CST)     [id number] - [place], [place] - [address]     Unable to complete prescription refill per RN Medication Refill Policy. Shoshana Gonzalez RN .............. 1/24/2019  3:27 PM

## 2019-02-11 NOTE — PROGRESS NOTES
Nursing Notes:   Krista Muse LPN  1/22/2019  4:11 PM  Signed  Patient comes in to the clinic today for medication review and refills.    Krista Muse LPN  1/22/2019  4:11 PM  Signed  No LMP recorded. Patient has had an ablation.  Medication Reconciliation: complete    Krista Muse LPN  1/22/2019 4:09 PM        SUBJECTIVE:  Puja Ocasio is a 53 year old female who comes in today to follow-up on COPD/asthma.  She was prescribed Dulera as well as albuterol but has not been able to afford either medication.  She reports that she has no insurance here for the next month or 2 but then hope she will be able to get on [facility] insurance.  Patient continues to work full-time.  Unfortunately she continues to smoke 1/2 pack/day as well also.  She is interested in quitting smoking and has been on Chantix in the past with good success.  No side effects.    Anxiety has been well controlled on current dose of Effexor.  She has felt a little depressed due to increased stressors over the past month, notably not having insurance she has not noticed any side effects.    PHQ-9 SCORE 8/1/2018 10/11/2018 1/22/2019   PHQ-9 Total Score 0 0 6     OLYA-7 SCORE 8/1/2018 10/11/2018 1/22/2019   Total Score 0 0 6             Current Outpatient Medications   Medication Sig Dispense Refill     albuterol (PROAIR HFA/PROVENTIL HFA/VENTOLIN HFA) 108 (90 Base) MCG/ACT inhaler Inhale 2 puffs into the lungs every 4 hours as needed [facility] please 8.5 g 3     EPINEPHrine (EPIPEN/ADRENACLICK/OR ANY BX GENERIC EQUIV) 0.3 MG/0.3ML injection 2-pack Inject 0.3 mg into the muscle once as needed For allergic reaction       mometasone-formoterol (DULERA) 100-5 MCG/ACT oral inhaler Inhale 2 puffs into the lungs 2 times daily       mometasone-formoterol (DULERA) 200-5 MCG/ACT inhaler Inhale 2 puffs into the lungs 2 times daily [facility] please 13 g 11     Spacer/Aero-Holding Chambers (AEROCHAMBER MV) MISC For home use.       varenicline  "(CHANTIX ARMANDO) 0.5 MG X 11 & 1 MG X 42 tablet Take 0.5 mg tab daily for 3 days, THEN 0.5 mg tab twice daily for 4 days, THEN 1 mg twice daily. [facility] please 53 tablet 0     venlafaxine (EFFEXOR-XR) 150 MG 24 hr capsule Take 1 capsule (150 mg) by mouth daily (with dinner) [facility] please 90 capsule 3       /82   Pulse 76   Temp 98  F (36.7  C)   Resp 16   Ht 1.575 m (5' 2\")   Wt 70.8 kg (156 lb)   BMI 28.53 kg/m      Exam:  General: No acute distress  HEENT: Thyroid palpated without nodularity or enlargement  Respiratory: Currently clear to auscultation bilaterally without wheezing  Cardiovascular: Regular rate and rhythm      ASSESSMENT/Plan :    Results for orders placed or performed in visit on 11/08/17   Lipid Profile   Result Value Ref Range    LDL Cholesterol Calculated 145 (H) <100 mg/dL    Non-HDL Cholesterol - Historical 167 (H) <145 mg/dL    Cholesterol/HDL Ratio - Historical 3.42 <4.50    Provider Ordered Status - Historical FASTING     Cholesterol Total 236 (H) <200 mg/dL    HDL Cholesterol 69 23 - 92 mg/dL    Triglycerides 108 <150 mg/dL   Comprehensive metabolic panel   Result Value Ref Range    Sodium 143 133 - 143 mmol/L    Creatinine 0.65 (L) 0.70 - 1.30 mg/dL    BUN/Creatinine Ratio - Historical 29     ALT (SGPT) - Historical 13 7 - 52 IU/L    Carbon Dioxide 26 21 - 31 mmol/L    GFR Estimate If Black >60 >60 ml/min/1.73m2    Bilirubin Total 0.6 0.3 - 1.0 mg/dL    Chloride 101 98 - 107 mmol/L    Glucose 93 70 - 105 mg/dL    Protein Total 7.2 6.4 - 8.9 g/dL    Globulin - Historical 3.0 2.0 - 3.7 g/dL    A/G Ratio - Historical 1.4 1.0 - 2.0    Alkaline Phosphatase 67 34 - 104 IU/L    Anion Gap - Historical 16 5 - 18    Urea Nitrogen 19 7 - 25 mg/dL    Albumin 4.2 3.5 - 5.7 g/dL    AST (SGOT) - Historical 18 13 - 39 IU/L    Potassium 4.3 3.5 - 5.1 mmol/L    Calcium 9.9 8.6 - 10.3 mg/dL    GFR If Not  - Historical >60 >60 ml/min/1.73m2       No images are attached to " the encounter or orders placed in the encounter.      1. Panic attack  Anxiety seems to be fairly well controlled on current dose of Effexor.  We will continue at current dosage.  - venlafaxine (EFFEXOR-XR) 150 MG 24 hr capsule; Take 1 capsule (150 mg) by mouth daily (with dinner) [facility] please  Dispense: 90 capsule; Refill: 3    2. Chronic obstructive pulmonary disease with acute exacerbation (H)  As noted below patient unable to afford medications.  Will trial [facility] as I think the cost would significantly outweigh the risk of hospitalization in addition to obviously higher quality of life and less long-term risk for her.    - albuterol (PROAIR HFA/PROVENTIL HFA/VENTOLIN HFA) 108 (90 Base) MCG/ACT inhaler; Inhale 2 puffs into the lungs every 4 hours as needed [facility] please  Dispense: 8.5 g; Refill: 3  - mometasone-formoterol (DULERA) 200-5 MCG/ACT inhaler; Inhale 2 puffs into the lungs 2 times daily [facility] please  Dispense: 13 g; Refill: 11    3. Moderate persistent asthma, unspecified whether complicated  Asthma has been relatively stable despite continued tobacco use.  She cannot afford Dulera or albuterol.  Explained to patient that we may be able to get her a months worth of medication through [facility] as she is optimistic she will have better insurance here in the next month or 2.    4. Encounter for tobacco use cessation counseling  [facility] suggest tobacco cessation.  Patient does want to quit and would like to retrial Chantix.  - varenicline (CHANTIX ARMANDO) 0.5 MG X 11 & 1 MG X 42 tablet; Take 0.5 mg tab daily for 3 days, THEN 0.5 mg tab twice daily for 4 days, THEN 1 mg twice daily. [facility] please  Dispense: 53 tablet; Refill: 0          There are no Patient Instructions on file for this visit.    Chaz Mcneill    This document was created using computer generated templates and voiceactivated software.

## 2019-03-29 DIAGNOSIS — Z71.6 ENCOUNTER FOR TOBACCO USE CESSATION COUNSELING: Primary | ICD-10-CM

## 2019-04-01 NOTE — TELEPHONE ENCOUNTER
"Requested Prescriptions   Pending Prescriptions Disp Refills     varenicline (CHANTIX STARTING MONTH ARMANDO) 0.5 MG X 11 & 1 MG X 42 tablet [Pharmacy Med Name: CHANTIX 0.5 (11)-1 TAB DS PK] 53 tablet 0     Sig: TAKE AS DIRECTED    Partial Cholinergic Nicotinic Agonist Agents Passed - 3/29/2019 10:06 AM       Passed - Blood pressure under 140/90 in past 12 months    BP Readings from Last 3 Encounters:   01/22/19 120/82   08/01/18 122/80   07/16/18 130/84                Passed - Recent (12 mo) or future (30 days) visit within the authorizing provider's specialty    Patient had office visit in the last 12 months or has a visit in the next 30 days with authorizing provider or within the authorizing provider's specialty.  See \"Patient Info\" tab in inbasket, or \"Choose Columns\" in Meds & Orders section of the refill encounter.             Passed - Medication is active on med list       Passed - Patient is 18 years of age or older       Passed - Patient is not pregnant       Passed - No positive pregnancy test on file in past 12 months      Prescription approved per [facility].    "

## 2019-04-02 RX ORDER — VARENICLINE TARTRATE 1 MG/1
1 TABLET, FILM COATED ORAL 2 TIMES DAILY
Qty: 180 TABLET | Refills: 1 | Status: SHIPPED | OUTPATIENT
Start: 2019-04-02 | End: 2020-07-09

## 2019-04-02 NOTE — TELEPHONE ENCOUNTER
[facility] sent Rx request for the following:      varenicline (CHANTIX STARTING MONTH ARMANDO) 0.5 MG X 11 & 1 MG X 42 tablet  Sig: TAKE AS DIRECTED    Last Prescription:  varenicline (CHANTIX ARMANDO) 0.5 MG X 11 & 1 MG X 42 tablet, sig: Sig: Take 0.5 mg tab daily for 3 days, THEN 0.5 mg tab twice daily for 4 days, THEN 1 mg twice daily. [facility] please, #53, R-0 on 1/22/19 at [facility].    Called and spoke to Patient after verifying last name and date of birth. Pt states she is wanting the continuing pack, NOT the starting month armando, and confirmed that she would like it sent to [facility] pharmacy. Med abdiel'd up as requested and starting month armando refused, with note to pharmacy.     Last Office Visit:              1/22/19  Future Office visit:           None.    Routing refill request to provider for review/approval because:  Continuing month armando not on active medication list.    Unable to complete prescription refill per RN Medication Refill Policy. Shoshana Gonzalez RN .............. 4/2/2019  9:17 AM

## 2019-04-26 DIAGNOSIS — Z71.6 ENCOUNTER FOR TOBACCO USE CESSATION COUNSELING: ICD-10-CM

## 2019-04-30 NOTE — TELEPHONE ENCOUNTER
[facility] sent Rx request for the following:      CHANTIX 0.5 (11)-1 TAB DS PK   Sig: TAKE AS DIRECTED    Per refill encounter, dated 3/29/19:  Called and spoke to Patient after verifying last name and date of birth. Pt states she is wanting the continuing pack, NOT the starting month natasha, and confirmed that she would like it sent to [facility] pharmacy. Med abdiel'd up as requested and starting month natasha refused, with note to pharmacy.     Last written Rx:  varenicline (CHANTIX) 1 MG tablet 180 tablet 1 2019  --   Sig - Route: Take 1 tablet (1 mg) by mouth 2 times daily - Oral     Called and spoke with Kerry at [facility], after verifying Patient's last name and . She states request can be disregarded at this time, since they have been filling the 1 mg tablet, per above prescription. Refill request refused, with note to pharmacy. Shoshana Gonzalez RN .............. 2019  11:03 AM

## 2019-09-28 ENCOUNTER — HOSPITAL ENCOUNTER (EMERGENCY)
Facility: OTHER | Age: 54
Discharge: HOME | End: 2019-09-28
Attending: FAMILY MEDICINE | Admitting: FAMILY MEDICINE

## 2019-09-28 VITALS
BODY MASS INDEX: 28.53 KG/M2 | DIASTOLIC BLOOD PRESSURE: 72 MMHG | WEIGHT: 156 LBS | OXYGEN SATURATION: 91 % | RESPIRATION RATE: 19 BRPM | SYSTOLIC BLOOD PRESSURE: 152 MMHG | TEMPERATURE: 97.9 F | HEART RATE: 89 BPM

## 2019-09-28 DIAGNOSIS — J45.21 MILD INTERMITTENT ASTHMA WITH EXACERBATION: ICD-10-CM

## 2019-09-28 PROCEDURE — 25000131 ZZH RX MED GY IP 250 OP 636 PS 637: Performed by: FAMILY MEDICINE

## 2019-09-28 PROCEDURE — 99283 EMERGENCY DEPT VISIT LOW MDM: CPT | Mod: Z6 | Performed by: FAMILY MEDICINE

## 2019-09-28 PROCEDURE — 99283 EMERGENCY DEPT VISIT LOW MDM: CPT | Mod: 25 | Performed by: FAMILY MEDICINE

## 2019-09-28 PROCEDURE — 25000125 ZZHC RX 250: Performed by: FAMILY MEDICINE

## 2019-09-28 PROCEDURE — 40000275 ZZH STATISTIC RCP TIME EA 10 MIN

## 2019-09-28 PROCEDURE — 25000132 ZZH RX MED GY IP 250 OP 250 PS 637: Performed by: FAMILY MEDICINE

## 2019-09-28 PROCEDURE — 94640 AIRWAY INHALATION TREATMENT: CPT

## 2019-09-28 PROCEDURE — 25000125 ZZHC RX 250: Performed by: EMERGENCY MEDICINE

## 2019-09-28 RX ORDER — ALBUTEROL SULFATE 0.83 MG/ML
2.5 SOLUTION RESPIRATORY (INHALATION) ONCE
Status: COMPLETED | OUTPATIENT
Start: 2019-09-28 | End: 2019-09-28

## 2019-09-28 RX ORDER — BUDESONIDE 0.5 MG/2ML
0.5 INHALANT ORAL ONCE
Status: COMPLETED | OUTPATIENT
Start: 2019-09-28 | End: 2019-09-28

## 2019-09-28 RX ORDER — PREDNISONE 20 MG/1
20 TABLET ORAL 2 TIMES DAILY
Qty: 10 TABLET | Refills: 0 | Status: SHIPPED | OUTPATIENT
Start: 2019-09-28 | End: 2019-09-30

## 2019-09-28 RX ORDER — AZITHROMYCIN 250 MG/1
500 TABLET, FILM COATED ORAL ONCE
Status: COMPLETED | OUTPATIENT
Start: 2019-09-28 | End: 2019-09-28

## 2019-09-28 RX ORDER — PREDNISONE 20 MG/1
40 TABLET ORAL ONCE
Status: COMPLETED | OUTPATIENT
Start: 2019-09-28 | End: 2019-09-28

## 2019-09-28 RX ORDER — PREDNISONE 20 MG/1
20 TABLET ORAL ONCE
Status: COMPLETED | OUTPATIENT
Start: 2019-09-28 | End: 2019-09-28

## 2019-09-28 RX ORDER — PREDNISONE 10 MG/1
TABLET ORAL
Qty: 30 TABLET | Refills: 0 | Status: SHIPPED | OUTPATIENT
Start: 2019-09-28 | End: 2020-02-27

## 2019-09-28 RX ORDER — AZITHROMYCIN 250 MG/1
TABLET, FILM COATED ORAL
Qty: 4 TABLET | Refills: 0 | Status: SHIPPED | OUTPATIENT
Start: 2019-09-28 | End: 2020-02-27

## 2019-09-28 RX ORDER — IPRATROPIUM BROMIDE AND ALBUTEROL SULFATE 2.5; .5 MG/3ML; MG/3ML
3 SOLUTION RESPIRATORY (INHALATION) ONCE
Status: COMPLETED | OUTPATIENT
Start: 2019-09-28 | End: 2019-09-28

## 2019-09-28 RX ADMIN — IPRATROPIUM BROMIDE AND ALBUTEROL SULFATE 3 ML: .5; 3 SOLUTION RESPIRATORY (INHALATION) at 18:08

## 2019-09-28 RX ADMIN — AZITHROMYCIN 500 MG: 250 TABLET, FILM COATED ORAL at 18:51

## 2019-09-28 RX ADMIN — BUDESONIDE 0.5 MG: 0.5 INHALANT RESPIRATORY (INHALATION) at 20:19

## 2019-09-28 RX ADMIN — ALBUTEROL SULFATE 2.5 MG: 2.5 SOLUTION RESPIRATORY (INHALATION) at 20:16

## 2019-09-28 RX ADMIN — PREDNISONE 40 MG: 20 TABLET ORAL at 18:06

## 2019-09-28 RX ADMIN — ALBUTEROL SULFATE 2.5 MG: 2.5 SOLUTION RESPIRATORY (INHALATION) at 18:53

## 2019-09-28 RX ADMIN — PREDNISONE 20 MG: 20 TABLET ORAL at 18:51

## 2019-09-28 NOTE — ED PROVIDER NOTES
History     Chief Complaint   Patient presents with     Shortness of Breath     HPI  Puja Ocasio is a 54 year old female with a history of asthma who presents the emergency room with difficulty breathing.  She states that about 3 days ago she started to develop a cold that is basically nonproductive and when it does produce any sputum it is clear.  She feels very tight and feels like it is getting progressively worse over the last 3 days.  She has not had any fever or chills.  She denies any headache sore throat.  No abdominal pain.  No nausea or vomiting.    Allergies:  Allergies   Allergen Reactions     Tree Nuts  [Nuts] Anaphylaxis     Metronidazole Other (See Comments)     Pt can't remember.      Tetracycline Other (See Comments)     Pt can't remember.        Problem List:    Patient Active Problem List    Diagnosis Date Noted     Allergic rhinitis 2018     Priority: Medium     Overview:   possible with sinus congestion       Panic attack 2018     Priority: Medium     Asthma 2015     Priority: Medium     Tobacco use disorder 01/10/2011     Priority: Medium        Past Medical History:    Past Medical History:   Diagnosis Date     Abnormal cytological findings in specimens from other organs, systems and tissues      Epilepsy without status epilepticus, not intractable (H)      Gastritis without bleeding      Other specified postprocedural states      Tinea pedis      Tobacco use        Past Surgical History:    Past Surgical History:   Procedure Laterality Date     APPENDECTOMY OPEN      at age 16 for acute appendicitis      SECTION      x2     COLONOSCOPY  2013,lian - reported NL     OTHER SURGICAL HISTORY      11/10,24518.0,MD LIGATE FALLOPIAN TUBE,uterine ablation, Dr. Kebede       Family History:    Family History   Problem Relation Age of Onset     Colon Cancer Mother         Cancer-colon     Prostate Cancer Father         Cancer-prostate     Heart  Disease Other         Heart Disease     Other - See Comments Other         COPD/Emphysema     Other - See Comments Other         Stroke     Breast Cancer Maternal Aunt         Cancer-breast       Social History:  Marital Status:  Single [1]  Social History     Tobacco Use     Smoking status: Current Every Day Smoker     Packs/day: 0.50     Years: 25.00     Pack years: 12.50     Types: Cigarettes     Smokeless tobacco: Never Used   Substance Use Topics     Alcohol use: Yes     Comment: Alcoholic Drinks/day: occasional     Drug use: No     Comment: Drug use: No        Medications:    albuterol (PROAIR HFA/PROVENTIL HFA/VENTOLIN HFA) 108 (90 Base) MCG/ACT inhaler  azithromycin (ZITHROMAX) 250 MG tablet  mometasone-formoterol (DULERA) 100-5 MCG/ACT oral inhaler  mometasone-formoterol (DULERA) 200-5 MCG/ACT inhaler  predniSONE (DELTASONE) 10 MG tablet  predniSONE (DELTASONE) 20 MG tablet  Spacer/Aero-Holding Chambers (AEROCHAMBER MV) MISC  venlafaxine (EFFEXOR-XR) 150 MG 24 hr capsule  EPINEPHrine (EPIPEN/ADRENACLICK/OR ANY BX GENERIC EQUIV) 0.3 MG/0.3ML injection 2-pack  varenicline (CHANTIX ARMANDO) 0.5 MG X 11 & 1 MG X 42 tablet  varenicline (CHANTIX) 1 MG tablet          Review of Systems   All other systems reviewed and are negative.      Physical Exam   BP: (!) 152/72  Pulse: 89  Heart Rate: 89  Temp: 97.9  F (36.6  C)  Resp: 19  Weight: 70.8 kg (156 lb)  SpO2: 93 %      Physical Exam  Vitals signs and nursing note reviewed.   Constitutional:       Appearance: Normal appearance. She is well-developed.   HENT:      Head: Normocephalic and atraumatic.   Neck:      Musculoskeletal: Normal range of motion and neck supple.   Cardiovascular:      Rate and Rhythm: Normal rate and regular rhythm.      Pulses: Normal pulses.      Heart sounds: Normal heart sounds.   Pulmonary:      Effort: Pulmonary effort is normal.      Breath sounds: Wheezing present.      Comments: End  expiratory wheezes bilaterally.  No obvious  consolidation is appreciated.  Patient has poor air exchange.  Abdominal:      General: Abdomen is flat.      Palpations: Abdomen is soft.   Skin:     General: Skin is warm.      Capillary Refill: Capillary refill takes less than 2 seconds.   Neurological:      Mental Status: She is alert.         ED Course   Patient seen and examined.  Informed me that she had no insurance and would like to keep her bill  to a minimum.  Declined labs and chest x-ray.  We will give her oral prednisone and a nebulizer and see how she does from there.  She was comfortable with that plan.    Patient re-examined. Better air exchange but continues to be tight and more wheezes noted. Recommended second neb- will give albuterol.     Re-examined- continues to be tight- breath sounds seem less than previous exam. Will repeat albuterol neb.We discussed possible antibiotics and will start her on Zithromax.   ED Course as of Sep 29 0706   Sat Sep 28, 2019   1807 Pulse: 89     Procedures    No results found for this or any previous visit (from the past 24 hour(s)).    Medications   ipratropium - albuterol 0.5 mg/2.5 mg/3 mL (DUONEB) neb solution 3 mL (3 mLs Nebulization Given 9/28/19 1808)   predniSONE (DELTASONE) tablet 40 mg (40 mg Oral Given 9/28/19 1806)   albuterol (PROVENTIL) neb solution 2.5 mg (2.5 mg Nebulization Given 9/28/19 2016)   albuterol (PROVENTIL) neb solution 2.5 mg (2.5 mg Nebulization Given 9/28/19 1853)   predniSONE (DELTASONE) tablet 20 mg (20 mg Oral Given 9/28/19 1851)   azithromycin (ZITHROMAX) tablet 500 mg (500 mg Oral Given 9/28/19 1851)   budesonide (PULMICORT) neb solution 0.5 mg (0.5 mg Nebulization Given 9/28/19 2019)       Assessments & Plan (with Medical Decision Making)     I have reviewed the nursing notes.    I have reviewed the findings, diagnosis, plan and need for follow up with the patient.  Will sign out to Dr Brown at end of shift.     Discharge Medication List as of 9/28/2019  8:42 PM      START  taking these medications    Details   azithromycin (ZITHROMAX) 250 MG tablet Take 1 tablet daily for 4 days starting 9/29/2019., Disp-4 tablet, R-0, Local Print      !! predniSONE (DELTASONE) 10 MG tablet Take 40 mg daily for 3 days, then 30 mg daily for 3 days, then 20 mg daily for 3 days, then 10 mg daily for 3 days., Disp-30 tablet, R-0, Local Print      !! predniSONE (DELTASONE) 20 MG tablet Take 1 tablet (20 mg) by mouth 2 times daily, Disp-10 tablet, R-0, Local Print       !! - Potential duplicate medications found. Please discuss with provider.          Final diagnoses:   Mild intermittent asthma with exacerbation       9/28/2019   [facility]  09/29/19 0760

## 2019-09-28 NOTE — ED TRIAGE NOTES
Patient presents to the ED with SOB.  Patient states she has had a cold x3 days and states it has progressively gotten worse and symptoms feel similar to when she had pneumonia.  Patient states she has been taking an inhaler at home as well as mucinex and dayquil with no relief.

## 2019-09-28 NOTE — ED AVS SNAPSHOT
[facility]  [address]  [place] [address]  Phone:  [phone]  Fax:  [phone]                                    Puja Ocasio   MRN: [id number]    Department:  [facility]   Date of Visit:  9/28/2019           After Visit Summary Signature Page    I have received my discharge instructions, and my questions have been answered. I have discussed any challenges I see with this plan with the nurse or doctor.    ..........................................................................................................................................  Patient/Patient Representative Signature      ..........................................................................................................................................  Patient Representative Print Name and Relationship to Patient    ..................................................               ................................................  Date                                   Time    ..........................................................................................................................................  Reviewed by Signature/Title    ...................................................              ..............................................  Date                                               Time          22EPIC Rev 08/18

## 2019-09-28 NOTE — ED NOTES
RT in to do neb.      Patient asking about medications and cost.  She states she has to go to [facility] pharmacy due to coverage.  She is concerned she won't be able to get her meds before Monday.

## 2019-09-28 NOTE — ED NOTES
RT notified about the new neb order.  They state it will be a few minutes before they can get here.

## 2019-09-30 ENCOUNTER — OFFICE VISIT (OUTPATIENT)
Dept: FAMILY MEDICINE | Facility: OTHER | Age: 54
End: 2019-09-30
Attending: FAMILY MEDICINE

## 2019-09-30 VITALS
OXYGEN SATURATION: 91 % | TEMPERATURE: 99.6 F | WEIGHT: 160 LBS | HEART RATE: 84 BPM | BODY MASS INDEX: 29.26 KG/M2 | RESPIRATION RATE: 22 BRPM | DIASTOLIC BLOOD PRESSURE: 82 MMHG | SYSTOLIC BLOOD PRESSURE: 138 MMHG

## 2019-09-30 DIAGNOSIS — J45.909 ASTHMA, UNSPECIFIED ASTHMA SEVERITY, UNSPECIFIED WHETHER COMPLICATED, UNSPECIFIED WHETHER PERSISTENT: ICD-10-CM

## 2019-09-30 DIAGNOSIS — F17.200 TOBACCO USE DISORDER: Primary | ICD-10-CM

## 2019-09-30 PROCEDURE — 96372 THER/PROPH/DIAG INJ SC/IM: CPT

## 2019-09-30 PROCEDURE — 99213 OFFICE O/P EST LOW 20 MIN: CPT | Performed by: FAMILY MEDICINE

## 2019-09-30 PROCEDURE — 25000128 H RX IP 250 OP 636: Performed by: FAMILY MEDICINE

## 2019-09-30 RX ORDER — TRIAMCINOLONE ACETONIDE 40 MG/ML
80 INJECTION, SUSPENSION INTRA-ARTICULAR; INTRAMUSCULAR ONCE
Status: COMPLETED | OUTPATIENT
Start: 2019-09-30 | End: 2019-09-30

## 2019-09-30 RX ADMIN — TRIAMCINOLONE ACETONIDE 80 MG: 40 INJECTION, SUSPENSION INTRA-ARTICULAR; INTRAMUSCULAR at 10:28

## 2019-09-30 NOTE — PROGRESS NOTES
SUBJECTIVE:   Puja Ocasio is a 54 year old female who presents to clinic today for the following health issues: Continue breathing trouble    Patient arrives here because of continued breathing trouble.  She was seen in the rapid clinic 2 days ago where she was started on Zithromax and prednisone 40 mg a day.  This will be a taper.  No x-rays done.  She states that she has a history of asthma.  But also smokes.  She gets short of breath with simple activity walking down the riv[place].  She does have albuterol and Dulera at home.  She did get nebs in rapid clinic but reports it did not seem to help.        Patient Active Problem List    Diagnosis Date Noted     Allergic rhinitis 02/02/2018     Priority: Medium     Overview:   possible with sinus congestion       Panic attack 02/02/2018     Priority: Medium     Asthma 06/11/2015     Priority: Medium     Tobacco use disorder 01/10/2011     Priority: Medium     Past Medical History:   Diagnosis Date     Abnormal cytological findings in specimens from other organs, systems and tissues     HPV+ PAP -2008, normal paps 2009, 2010 (no endo cells)     Epilepsy without status epilepticus, not intractable (H)     No Comments Provided     Gastritis without bleeding     05/02/06,treated 05/06     Other specified postprocedural states     2008 (negative)     Tinea pedis     8/6/2010     Tobacco use     Attempts at cessation: electric cigarette, patches, chantix.        Review of Systems     OBJECTIVE:     /82   Pulse 84   Temp 99.6  F (37.6  C)   Resp 22   Wt 72.6 kg (160 lb)   SpO2 91%   BMI 29.26 kg/m    Body mass index is 29.26 kg/m .  Physical Exam  Pulmonary:      Effort: Pulmonary effort is normal.      Comments: Patient has slightly decreased breath sounds.  Some expiratory wheezes.  Neurological:      Mental Status: She is alert.         Diagnostic Test Results:  none     ASSESSMENT/PLAN:         1. Tobacco use disorder  Highly recommended patient quit  smoking.  We discussed long-term consequences including oxygen.  Feelings of suffocation.  Patient showed states she will try again.  80 mg of Kenalog given.  Continue prednisone taper.  Finish course of Zithromax.  I did offer an albuterol neb to use at home but patient declined.  - triamcinolone (KENALOG-40) injection 80 mg    2. Asthma, unspecified asthma severity, unspecified whether complicated, unspecified whether persistent    - triamcinolone (KENALOG-40) injection 80 mg      Jorje Durham MD  [facility]

## 2019-10-01 ASSESSMENT — ASTHMA QUESTIONNAIRES: ACT_TOTALSCORE: 24

## 2019-10-24 DIAGNOSIS — J44.1 CHRONIC OBSTRUCTIVE PULMONARY DISEASE WITH ACUTE EXACERBATION (H): Primary | ICD-10-CM

## 2019-10-24 DIAGNOSIS — F41.0 PANIC ATTACK: ICD-10-CM

## 2019-10-29 RX ORDER — ALBUTEROL SULFATE 90 UG/1
AEROSOL, METERED RESPIRATORY (INHALATION)
Qty: 54 G | Refills: 0 | Status: SHIPPED | OUTPATIENT
Start: 2019-10-29 | End: 2020-02-27

## 2019-10-29 RX ORDER — VENLAFAXINE HYDROCHLORIDE 150 MG/1
CAPSULE, EXTENDED RELEASE ORAL
Qty: 90 CAPSULE | Refills: 3 | OUTPATIENT
Start: 2019-10-29

## 2019-10-29 NOTE — TELEPHONE ENCOUNTER
[facility] sent Rx request for the following:    PROAIR HFA 90 MCG HFA AER AD  Sig: INHALE 2 PUFFS INTO THE lungs EVERY 4 HOURS AS NEEDED  Last Prescription Date:   1/22/19  Last Fill Qty/Refills:         8.5g, R-3    Last Office Visit:              9/30/19  Future Office visit:           None.    Prescription approved per [facility] Refill Protocol for #54g at this time. Shoshana Gonzalez RN .............. 10/29/2019  9:53 AM

## 2019-10-29 NOTE — TELEPHONE ENCOUNTER
[facility] sent Rx request for the following:      VENLAFAXINE HCL  MG CAP ER 24H  Sig: TAKE 1 CAPSULE BY MOUTH DAILY WITH dinner  Last Prescription:  venlafaxine (EFFEXOR-XR) 150 MG 24 hr capsule 90 capsule 3 2019  No   Sig - Route: Take 1 capsule (150 mg) by mouth daily (with dinner) [facility] please - Oral   Sent to pharmacy as: venlafaxine (EFFEXOR-XR) 150 MG 24 hr capsule   Class: E-Prescribe   Order: [id number]     [id number] - [place], [place] - [address]     Called and spoke with Kerry at [facility], after verifying Pt's last name and . She states Pt just filled her Effexor and will not need refill at this time. Pt has used up all 4 albuterol inhalers. Effexor refill request refused, with note to pharmacy. Unable to complete prescription refill per RN Medication Refill Policy. Shoshana Gonzalez RN .............. 10/29/2019  9:50 AM

## 2020-02-27 ENCOUNTER — OFFICE VISIT (OUTPATIENT)
Dept: FAMILY MEDICINE | Facility: OTHER | Age: 55
End: 2020-02-27
Attending: FAMILY MEDICINE
Payer: COMMERCIAL

## 2020-02-27 VITALS
HEIGHT: 62 IN | TEMPERATURE: 97.6 F | HEART RATE: 84 BPM | RESPIRATION RATE: 16 BRPM | OXYGEN SATURATION: 94 % | SYSTOLIC BLOOD PRESSURE: 120 MMHG | DIASTOLIC BLOOD PRESSURE: 80 MMHG | WEIGHT: 155 LBS | BODY MASS INDEX: 28.52 KG/M2

## 2020-02-27 DIAGNOSIS — J44.1 CHRONIC OBSTRUCTIVE PULMONARY DISEASE WITH ACUTE EXACERBATION (H): ICD-10-CM

## 2020-02-27 PROCEDURE — 99213 OFFICE O/P EST LOW 20 MIN: CPT | Performed by: FAMILY MEDICINE

## 2020-02-27 RX ORDER — PREDNISONE 10 MG/1
40 TABLET ORAL DAILY
Qty: 20 TABLET | Refills: 0 | Status: SHIPPED | OUTPATIENT
Start: 2020-02-27 | End: 2020-03-03

## 2020-02-27 RX ORDER — VENLAFAXINE HYDROCHLORIDE 150 MG/1
CAPSULE, EXTENDED RELEASE ORAL
Qty: 90 CAPSULE | Refills: 1 | Status: CANCELLED | OUTPATIENT
Start: 2020-02-27

## 2020-02-27 RX ORDER — ALBUTEROL SULFATE 90 UG/1
AEROSOL, METERED RESPIRATORY (INHALATION)
Qty: 54 G | Refills: 0 | Status: SHIPPED | OUTPATIENT
Start: 2020-02-27 | End: 2020-07-09

## 2020-02-27 ASSESSMENT — ANXIETY QUESTIONNAIRES
1. FEELING NERVOUS, ANXIOUS, OR ON EDGE: MORE THAN HALF THE DAYS
3. WORRYING TOO MUCH ABOUT DIFFERENT THINGS: MORE THAN HALF THE DAYS
2. NOT BEING ABLE TO STOP OR CONTROL WORRYING: MORE THAN HALF THE DAYS
GAD7 TOTAL SCORE: 12
IF YOU CHECKED OFF ANY PROBLEMS ON THIS QUESTIONNAIRE, HOW DIFFICULT HAVE THESE PROBLEMS MADE IT FOR YOU TO DO YOUR WORK, TAKE CARE OF THINGS AT HOME, OR GET ALONG WITH OTHER PEOPLE: NOT DIFFICULT AT ALL
6. BECOMING EASILY ANNOYED OR IRRITABLE: SEVERAL DAYS
7. FEELING AFRAID AS IF SOMETHING AWFUL MIGHT HAPPEN: SEVERAL DAYS
5. BEING SO RESTLESS THAT IT IS HARD TO SIT STILL: MORE THAN HALF THE DAYS

## 2020-02-27 ASSESSMENT — PAIN SCALES - GENERAL: PAINLEVEL: NO PAIN (0)

## 2020-02-27 ASSESSMENT — ENCOUNTER SYMPTOMS
COUGH: 1
SORE THROAT: 0
CHEST TIGHTNESS: 1
SHORTNESS OF BREATH: 0

## 2020-02-27 ASSESSMENT — MIFFLIN-ST. JEOR: SCORE: 1252.36

## 2020-02-27 ASSESSMENT — PATIENT HEALTH QUESTIONNAIRE - PHQ9
SUM OF ALL RESPONSES TO PHQ QUESTIONS 1-9: 0
5. POOR APPETITE OR OVEREATING: MORE THAN HALF THE DAYS

## 2020-02-27 NOTE — NURSING NOTE
"Chief Complaint   Patient presents with     URI     Cough, Fever, Chills, Congestion, check lungs         Initial /80   Pulse 84   Temp 97.6  F (36.4  C) (Temporal)   Resp 16   Ht 1.568 m (5' 1.75\")   Wt 70.3 kg (155 lb)   SpO2 94%   BMI 28.58 kg/m   Estimated body mass index is 28.58 kg/m  as calculated from the following:    Height as of this encounter: 1.568 m (5' 1.75\").    Weight as of this encounter: 70.3 kg (155 lb).    Medication Reconciliation: complete      Norma J. Gosselin, LPN  "

## 2020-02-27 NOTE — PROGRESS NOTES
SUBJECTIVE:   Puja Ocasio is a 54 year old female who presents to clinic today for the following health issues:    HPI  Chest Congestion:  Symptoms began on Tuesday with aches and chills.  They have since progressed to chest congestion and cough productive of yellow, non-bloody sputum.  She reports subjective fever but did not take her temperature.  She has tried Advil Cold and Sinus medication, Mucinex, and nasal saline spray without significant improvement.   She has a history of asthma, last evaluated with PFT in , characterized as severe obstructive airway disease.  She has Dulera prescribed for use twice daily and an Albuterol inhaler for use as needed.  She states that on a typical day, she does not use either of these inhalers.  Since becoming sick, she has been using them both twice daily.    Past Medical History:   Diagnosis Date     Abnormal cytological findings in specimens from other organs, systems and tissues     HPV+ PAP -, normal paps ,  (no endo cells)     Epilepsy without status epilepticus, not intractable (H)     No Comments Provided     Gastritis without bleeding     06,treated      Other specified postprocedural states      (negative)     Tinea pedis     2010     Tobacco use     Attempts at cessation: electric cigarette, patches, chantix.      Past Surgical History:   Procedure Laterality Date     APPENDECTOMY OPEN      at age 16 for acute appendicitis      SECTION      x2     COLONOSCOPY  2013,lian - reported NL     OTHER SURGICAL HISTORY      11/10,67072.0,[place] LIGATE FALLOPIAN TUBE,uterine ablation, Dr. Kebede     Family History   Problem Relation Age of Onset     Colon Cancer Mother         Cancer-colon     Prostate Cancer Father         Cancer-prostate     Heart Disease Other         Heart Disease     Other - See Comments Other         COPD/Emphysema     Other - See Comments Other         Stroke     Breast Cancer  "Maternal Aunt         Cancer-breast     Social History     Tobacco Use     Smoking status: Current Every Day Smoker     Packs/day: 0.50     Years: 25.00     Pack years: 12.50     Types: Cigarettes     Smokeless tobacco: Never Used   Substance Use Topics     Alcohol use: Yes     Comment: Alcoholic Drinks/day: occasional     Current Outpatient Medications   Medication Sig Dispense Refill     albuterol (PROAIR HFA) 108 (90 Base) MCG/ACT inhaler INHALE 2 PUFFS INTO THE lungs EVERY 4 HOURS AS NEEDED 54 g 0     EPINEPHrine (EPIPEN/ADRENACLICK/OR ANY BX GENERIC EQUIV) 0.3 MG/0.3ML injection 2-pack Inject 0.3 mg into the muscle once as needed For allergic reaction       mometasone-formoterol (DULERA) 200-5 MCG/ACT inhaler Inhale 2 puffs into the lungs 2 times daily [facility] please 13 g 11     predniSONE (DELTASONE) 10 MG tablet Take 4 tablets (40 mg) by mouth daily for 5 days 20 tablet 0     Spacer/Aero-Holding Chambers (AEROCHAMBER MV) MISC For home use.       varenicline (CHANTIX ARMANDO) 0.5 MG X 11 & 1 MG X 42 tablet Take 0.5 mg tab daily for 3 days, THEN 0.5 mg tab twice daily for 4 days, THEN 1 mg twice daily. [facility] please 53 tablet 0     varenicline (CHANTIX) 1 MG tablet Take 1 tablet (1 mg) by mouth 2 times daily 180 tablet 1     venlafaxine (EFFEXOR-XR) 150 MG 24 hr capsule TAKE 1 CAPSULE BY MOUTH DAILY WITH dinner 90 capsule 1     Allergies   Allergen Reactions     Tree Nuts  [Nuts] Anaphylaxis     Metronidazole Other (See Comments)     Pt can't remember.      Tetracycline Other (See Comments)     Pt can't remember.      Review of Systems   HENT: Negative for congestion, ear pain and sore throat.    Respiratory: Positive for cough and chest tightness. Negative for shortness of breath.       OBJECTIVE:     /80   Pulse 84   Temp 97.6  F (36.4  C) (Temporal)   Resp 16   Ht 1.568 m (5' 1.75\")   Wt 70.3 kg (155 lb)   SpO2 94%   BMI 28.58 kg/m    Body mass index is 28.58 kg/m .  Physical " Exam  Constitutional:       Appearance: Normal appearance.   HENT:      Right Ear: Tympanic membrane normal.      Left Ear: Tympanic membrane normal.      Mouth/Throat:      Mouth: Mucous membranes are moist.      Pharynx: Oropharynx is clear.   Neck:      Musculoskeletal: Neck supple. No muscular tenderness.   Cardiovascular:      Rate and Rhythm: Normal rate and regular rhythm.   Pulmonary:      Effort: Pulmonary effort is normal.      Breath sounds: Wheezing present. No rhonchi or rales.      Comments: Prolonged expiratory phase.  Lymphadenopathy:      Cervical: No cervical adenopathy.   Neurological:      Mental Status: She is alert.       ASSESSMENT/PLAN:     1. Chronic obstructive pulmonary disease with acute exacerbation (H)  Start Prednisone five-day burst.  Recommend using Dulera twice daily as prescribed with Albuterol used as needed for wheezing, shortness of breath, and cough.  Return to clinic if symptoms worsening or failing to improve.  Would likely benefit from repeat PFT 6-8 weeks after current symptoms resolve.  - mometasone-formoterol (DULERA) 200-5 MCG/ACT inhaler; Inhale 2 puffs into the lungs 2 times daily [facility] please  Dispense: 13 g; Refill: 11  - albuterol (PROAIR HFA) 108 (90 Base) MCG/ACT inhaler; INHALE 2 PUFFS INTO THE lungs EVERY 4 HOURS AS NEEDED  Dispense: 54 g; Refill: 0  - predniSONE (DELTASONE) 10 MG tablet; Take 4 tablets (40 mg) by mouth daily for 5 days  Dispense: 20 tablet; Refill: 0      DO GUSTAVO [facility]

## 2020-02-28 ASSESSMENT — ANXIETY QUESTIONNAIRES: GAD7 TOTAL SCORE: 12

## 2020-07-09 ENCOUNTER — OFFICE VISIT (OUTPATIENT)
Dept: FAMILY MEDICINE | Facility: OTHER | Age: 55
End: 2020-07-09
Attending: NURSE PRACTITIONER
Payer: COMMERCIAL

## 2020-07-09 VITALS
HEIGHT: 63 IN | SYSTOLIC BLOOD PRESSURE: 126 MMHG | WEIGHT: 163.4 LBS | OXYGEN SATURATION: 96 % | DIASTOLIC BLOOD PRESSURE: 80 MMHG | TEMPERATURE: 97.6 F | RESPIRATION RATE: 18 BRPM | BODY MASS INDEX: 28.95 KG/M2 | HEART RATE: 82 BPM

## 2020-07-09 DIAGNOSIS — Z71.6 ENCOUNTER FOR TOBACCO USE CESSATION COUNSELING: ICD-10-CM

## 2020-07-09 DIAGNOSIS — Z12.4 CERVICAL CANCER SCREENING: ICD-10-CM

## 2020-07-09 DIAGNOSIS — F41.9 ANXIETY: ICD-10-CM

## 2020-07-09 DIAGNOSIS — B37.2 YEAST DERMATITIS: ICD-10-CM

## 2020-07-09 DIAGNOSIS — R68.82 DECREASED LIBIDO: ICD-10-CM

## 2020-07-09 DIAGNOSIS — Z13.220 LIPID SCREENING: ICD-10-CM

## 2020-07-09 DIAGNOSIS — Z12.11 COLON CANCER SCREENING: ICD-10-CM

## 2020-07-09 DIAGNOSIS — Z91.018 TREE NUT ALLERGY: ICD-10-CM

## 2020-07-09 DIAGNOSIS — L98.9 FACIAL LESION: ICD-10-CM

## 2020-07-09 DIAGNOSIS — Z00.00 ROUTINE HISTORY AND PHYSICAL EXAMINATION OF ADULT: ICD-10-CM

## 2020-07-09 DIAGNOSIS — J44.1 CHRONIC OBSTRUCTIVE PULMONARY DISEASE WITH ACUTE EXACERBATION (H): ICD-10-CM

## 2020-07-09 DIAGNOSIS — Z12.31 ENCOUNTER FOR SCREENING MAMMOGRAM FOR BREAST CANCER: Primary | ICD-10-CM

## 2020-07-09 DIAGNOSIS — Z80.0 FAMILY HISTORY OF COLON CANCER: ICD-10-CM

## 2020-07-09 LAB
ALBUMIN SERPL-MCNC: 4.5 G/DL (ref 3.5–5.7)
ALP SERPL-CCNC: 53 U/L (ref 34–104)
ALT SERPL W P-5'-P-CCNC: 12 U/L (ref 7–52)
ANION GAP SERPL CALCULATED.3IONS-SCNC: 7 MMOL/L (ref 3–14)
AST SERPL W P-5'-P-CCNC: 16 U/L (ref 13–39)
BASOPHILS # BLD AUTO: 0.1 10E9/L (ref 0–0.2)
BASOPHILS NFR BLD AUTO: 1 %
BILIRUB SERPL-MCNC: 0.6 MG/DL (ref 0.3–1)
BUN SERPL-MCNC: 12 MG/DL (ref 7–25)
CALCIUM SERPL-MCNC: 10 MG/DL (ref 8.6–10.3)
CHLORIDE SERPL-SCNC: 103 MMOL/L (ref 98–107)
CHOLEST SERPL-MCNC: 250 MG/DL
CO2 SERPL-SCNC: 28 MMOL/L (ref 21–31)
CREAT SERPL-MCNC: 0.74 MG/DL (ref 0.6–1.2)
DIFFERENTIAL METHOD BLD: ABNORMAL
EOSINOPHIL # BLD AUTO: 0.1 10E9/L (ref 0–0.7)
EOSINOPHIL NFR BLD AUTO: 0.6 %
ERYTHROCYTE [DISTWIDTH] IN BLOOD BY AUTOMATED COUNT: 12.6 % (ref 10–15)
GFR SERPL CREATININE-BSD FRML MDRD: 81 ML/MIN/{1.73_M2}
GLUCOSE SERPL-MCNC: 105 MG/DL (ref 70–105)
HCT VFR BLD AUTO: 50.3 % (ref 35–47)
HDLC SERPL-MCNC: 59 MG/DL (ref 23–92)
HGB BLD-MCNC: 16.2 G/DL (ref 11.7–15.7)
IMM GRANULOCYTES # BLD: 0 10E9/L (ref 0–0.4)
IMM GRANULOCYTES NFR BLD: 0.3 %
LDLC SERPL CALC-MCNC: 155 MG/DL
LYMPHOCYTES # BLD AUTO: 3.1 10E9/L (ref 0.8–5.3)
LYMPHOCYTES NFR BLD AUTO: 32.9 %
MCH RBC QN AUTO: 28.6 PG (ref 26.5–33)
MCHC RBC AUTO-ENTMCNC: 32.2 G/DL (ref 31.5–36.5)
MCV RBC AUTO: 89 FL (ref 78–100)
MONOCYTES # BLD AUTO: 0.7 10E9/L (ref 0–1.3)
MONOCYTES NFR BLD AUTO: 6.9 %
NEUTROPHILS # BLD AUTO: 5.5 10E9/L (ref 1.6–8.3)
NEUTROPHILS NFR BLD AUTO: 58.3 %
NONHDLC SERPL-MCNC: 191 MG/DL
PLATELET # BLD AUTO: 366 10E9/L (ref 150–450)
POTASSIUM SERPL-SCNC: 4.6 MMOL/L (ref 3.5–5.1)
PROT SERPL-MCNC: 7.5 G/DL (ref 6.4–8.9)
RBC # BLD AUTO: 5.66 10E12/L (ref 3.8–5.2)
SODIUM SERPL-SCNC: 138 MMOL/L (ref 134–144)
TRIGL SERPL-MCNC: 179 MG/DL
WBC # BLD AUTO: 9.4 10E9/L (ref 4–11)

## 2020-07-09 PROCEDURE — 85025 COMPLETE CBC W/AUTO DIFF WBC: CPT | Mod: ZL | Performed by: NURSE PRACTITIONER

## 2020-07-09 PROCEDURE — G0123 SCREEN CERV/VAG THIN LAYER: HCPCS | Performed by: NURSE PRACTITIONER

## 2020-07-09 PROCEDURE — 80053 COMPREHEN METABOLIC PANEL: CPT | Mod: ZL | Performed by: NURSE PRACTITIONER

## 2020-07-09 PROCEDURE — 99213 OFFICE O/P EST LOW 20 MIN: CPT | Mod: 25 | Performed by: NURSE PRACTITIONER

## 2020-07-09 PROCEDURE — 88142 CYTOPATH C/V THIN LAYER: CPT | Performed by: NURSE PRACTITIONER

## 2020-07-09 PROCEDURE — 80061 LIPID PANEL: CPT | Mod: ZL | Performed by: NURSE PRACTITIONER

## 2020-07-09 PROCEDURE — 99396 PREV VISIT EST AGE 40-64: CPT | Performed by: NURSE PRACTITIONER

## 2020-07-09 PROCEDURE — 36415 COLL VENOUS BLD VENIPUNCTURE: CPT | Mod: ZL | Performed by: NURSE PRACTITIONER

## 2020-07-09 PROCEDURE — 87624 HPV HI-RISK TYP POOLED RSLT: CPT | Mod: ZL | Performed by: NURSE PRACTITIONER

## 2020-07-09 RX ORDER — INHALER, ASSIST DEVICES
SPACER (EA) MISCELLANEOUS
Qty: 1 EACH | Refills: 0 | Status: SHIPPED | OUTPATIENT
Start: 2020-07-09

## 2020-07-09 RX ORDER — EPINEPHRINE 0.3 MG/.3ML
0.3 INJECTION SUBCUTANEOUS
Qty: 2 EACH | Refills: 1 | Status: SHIPPED | OUTPATIENT
Start: 2020-07-09 | End: 2022-09-08

## 2020-07-09 RX ORDER — VENLAFAXINE HYDROCHLORIDE 75 MG/1
75 CAPSULE, EXTENDED RELEASE ORAL DAILY
Qty: 30 CAPSULE | Refills: 11 | Status: SHIPPED | OUTPATIENT
Start: 2020-07-09 | End: 2021-04-16 | Stop reason: ALTCHOICE

## 2020-07-09 RX ORDER — PREDNISONE 10 MG/1
40 TABLET ORAL DAILY
Qty: 20 TABLET | Refills: 0 | Status: CANCELLED | OUTPATIENT
Start: 2020-07-09

## 2020-07-09 RX ORDER — ALBUTEROL SULFATE 90 UG/1
AEROSOL, METERED RESPIRATORY (INHALATION)
Qty: 54 G | Refills: 0 | Status: SHIPPED | OUTPATIENT
Start: 2020-07-09 | End: 2021-02-24

## 2020-07-09 RX ORDER — CLOTRIMAZOLE AND BETAMETHASONE DIPROPIONATE 10; .64 MG/G; MG/G
CREAM TOPICAL 2 TIMES DAILY
Qty: 45 G | Refills: 0 | Status: SHIPPED | OUTPATIENT
Start: 2020-07-09 | End: 2021-04-16

## 2020-07-09 RX ORDER — VARENICLINE TARTRATE 1 MG/1
1 TABLET, FILM COATED ORAL 2 TIMES DAILY
Qty: 180 TABLET | Refills: 1 | Status: SHIPPED | OUTPATIENT
Start: 2020-07-09 | End: 2021-04-16

## 2020-07-09 ASSESSMENT — MIFFLIN-ST. JEOR: SCORE: 1297.37

## 2020-07-09 ASSESSMENT — ANXIETY QUESTIONNAIRES
6. BECOMING EASILY ANNOYED OR IRRITABLE: NEARLY EVERY DAY
GAD7 TOTAL SCORE: 19
3. WORRYING TOO MUCH ABOUT DIFFERENT THINGS: NEARLY EVERY DAY
7. FEELING AFRAID AS IF SOMETHING AWFUL MIGHT HAPPEN: SEVERAL DAYS
2. NOT BEING ABLE TO STOP OR CONTROL WORRYING: NEARLY EVERY DAY
1. FEELING NERVOUS, ANXIOUS, OR ON EDGE: NEARLY EVERY DAY
IF YOU CHECKED OFF ANY PROBLEMS ON THIS QUESTIONNAIRE, HOW DIFFICULT HAVE THESE PROBLEMS MADE IT FOR YOU TO DO YOUR WORK, TAKE CARE OF THINGS AT HOME, OR GET ALONG WITH OTHER PEOPLE: VERY DIFFICULT
5. BEING SO RESTLESS THAT IT IS HARD TO SIT STILL: NEARLY EVERY DAY

## 2020-07-09 ASSESSMENT — PATIENT HEALTH QUESTIONNAIRE - PHQ9
5. POOR APPETITE OR OVEREATING: NEARLY EVERY DAY
SUM OF ALL RESPONSES TO PHQ QUESTIONS 1-9: 0

## 2020-07-09 ASSESSMENT — PAIN SCALES - GENERAL: PAINLEVEL: NO PAIN (0)

## 2020-07-09 NOTE — LETTER
July 23, 2020      Puja Ocasio  [address] [place] [address]        Dear ,    We are writing to inform you of your test results.    {results letter list:[id number]}      If you have any questions or concerns, please call the clinic at the number listed above.       Sincerely,        BLACK Car CNP

## 2020-07-09 NOTE — NURSING NOTE
"Chief Complaint   Patient presents with     Physical     Yearly       Patient presents today in clinic for the following patient presents for her yearly px.    Initial /80 (BP Location: Right arm, Cuff Size: Adult Regular)   Pulse 82   Temp 97.6  F (36.4  C)   Resp 18   Ht 1.588 m (5' 2.5\")   Wt 74.1 kg (163 lb 6.4 oz)   SpO2 96%   BMI 29.41 kg/m   Estimated body mass index is 29.41 kg/m  as calculated from the following:    Height as of this encounter: 1.588 m (5' 2.5\").    Weight as of this encounter: 74.1 kg (163 lb 6.4 oz).  Medication Reconciliation: complete    MARCELO LEMUS, LPN  "

## 2020-07-09 NOTE — LETTER
July 16, 2020    Puja Ocasio  [address] [place] [place] [address]    Dear Ms.Yuliyakeyana,  This letter is regarding your recent Pap smear (cervical cancer screening) and Human Papillomavirus (HPV) test.  We are happy to inform you that your Pap smear result is normal. Cervical cancer is closely linked with certain types of HPV. Your results showed no evidence of high-risk HPV.  We recommend you have your next PAP smear and HPV test in 5 years.  You will still need to return to the clinic every year for an annual exam and other preventive tests.  If you have additional questions regarding this result, please call.  Sincerely,    BLACK Car CNP

## 2020-07-09 NOTE — PATIENT INSTRUCTIONS
"Healthy Strategies  1. Eat at least 3 meals a day and never skip breakfast.  2. Eat more slowly.  3. Decrease portion size.  4. Provide structure by using meal replacement bars or shakes, and/or low calorie frozen meals.  5. For good nutrition incorporate fruit, vegetables, whole grains, lean protein, and low-fat dairy.  6. Remove trigger foods from yourenvironment to avoid impulse eating.  7. Increase physical activity: get a pedometer and aim for 10,000 steps a day or 30-35 minutes of activity 5 days per week.  8. Weigh yourself daily or at least weekly.  9. Keep a record of what you eat and your activity.  10. Establish a support system such as afriend, group or program.    11. Read Jamison Sultana's \"Eat to Live\". Remember it is important to have a minimum of 1200 calories a day, okay to use olive oil, 40 grams of fiber daily. No more than two servings (the size of your palm) of red meat a week.     Please consider the following general health recommendations:    Schedule a mammogram annually starting at the age of 40 years old unless recommended earlier by your primary care provider. Come for a general exam once yearly.    Eat a quality diet (generally, low in simple sugars, starches, cholesterol and saturated fat.)    Please get 1500 mg of calcium in divided doses with 1500 units vitamin D in your diet daily. Take supplements as needed to obtain full recommended amounts.     Stay physically active. Regular walking or other exercise is one of the best ways to minimize pain of arthritis; maintain independence and mobility; maintain bone strength; maintain conditioning of your heart. Find something you enjoy and a friend to do it with you.    Maintain ideal weight. Your Body mass index is Body mass index is 29.41 kg/m .. Generally a BMI of 20-25 is considered ideal. Overweight is defined as 25-30, obese is 30-35 and markedly obese is greater than 35.    Apply sun block (SPF 25 or greater) on exposed skin anytime you " are out in the sun to prevent skin cancer.     Wear a seatbelt whenever you are in a car.    Consider a bone density study every 2-5 years to see if you are at increased risk for fracture. If you have osteoporosis, medicine to strengthen your bones can significantly reduce your risk of fracture, back pain, and loss of height.    Colonoscopy (an exam of the colon) is recommended every 10 years after the age of 50 to screen for colon cancer. (More often if you are at increased risk.)    Obtain a flu shot every fall.    Receive a pneumonia shot series after the age of 65 (repeat in 5 years if you have other risk factors). This does not prevent all types of pneumonia, but reduces the risk of the worst bacterial causes of pneumonia.    You should have a tetanus booster at least once every 10 years.    A vaccine to reduce your chances of getting shingles is available if you are over 50. Information about the vaccine is available through the clinic or at:  (https://www.cdc.gov/vaccines/vpd/shingles/public/shingrix/index.html)    Check blood sugar annually. Cholesterol annually unless you have had a normal level when last checked within 5 years.     I recommend that you have a general physical exam every year. You should have a pap test every 3 years between the ages of 21 and 30 and every 3-5 years between the ages of 30 and 65 depending on your test unless you have had previous abnormal pap smears, (in these cases the exams and PAP's should be done on a schedule as recommended by your primary care provider). If you have had hysterectomy in the past, your future Pap plan may be different.

## 2020-07-09 NOTE — PROGRESS NOTES
"Nursing Notes:   Fely Dexter LPN  7/9/2020  8:53 AM  Signed  Chief Complaint   Patient presents with     Physical     Yearly       Patient presents today in clinic for the following patient presents for her yearly px.    Initial /80 (BP Location: Right arm, Cuff Size: Adult Regular)   Pulse 82   Temp 97.6  F (36.4  C)   Resp 18   Ht 1.588 m (5' 2.5\")   Wt 74.1 kg (163 lb 6.4 oz)   SpO2 96%   BMI 29.41 kg/m   Estimated body mass index is 29.41 kg/m  as calculated from the following:    Height as of this encounter: 1.588 m (5' 2.5\").    Weight as of this encounter: 74.1 kg (163 lb 6.4 oz).  Medication Reconciliation: complete    MARCELO LEMUS LPN    ANNUAL PHYSICAL - FEMALE    HPI: Puja Ocasio is a 55 year old female who presents for a yearly exam.      Concerns include:    1.  She like to work on smoking cessation again.  She has Chantix at home that she has been successful in the past.  She also reports anxiety has been worse and has noted when she was taking venlafaxine 225 mg daily that helped with her anxiety as well as smoking cessation.  She is wondering if she can get the venlafaxine 75 mg to add to her 150 mg.    2.  She has had decreased libido.  She is not sure where she is regarding menopause as she has had an ablation many years ago and has not had a period for quite some time.  She does not have any other symptoms such as hot flashes or mood swings.    3.  She has a rash on her chest that has been there for about 1 year.  She reports this is significantly worse in the sun.  Is not itchy or painful.  She has used over-the-counter athlete's foot cream with no improvement.    4.  She had several moles around her eyes that she has noted.  The did not feel like these have changed or enlarged.    She does need refill of medications for COPD.  She is been using Dulera and albuterol inhaler as needed.  She is not using her Dulera twice daily as ordered.    No LMP recorded. Patient has had " an ablation.   No abnormal Pap smears in the past 5 years  FH of early CA?:  Mother had colon cancer in her early 60s  Cholesterol/DM concerns/screening: Due for screening  Tobacco?:  Yes, working on smoking cessation  Calcium intake: Dietary  DEXA: N/A  Last mammo:   Colonoscopy: Age 47  Immunizations: UTD    Patient Active Problem List    Diagnosis Date Noted     Allergic rhinitis 2018     Priority: Medium     Overview:   possible with sinus congestion       Panic attack 2018     Priority: Medium     Asthma 2015     Priority: Medium     Tobacco use disorder 01/10/2011     Priority: Medium       Past Medical History:   Diagnosis Date     Abnormal cytological findings in specimens from other organs, systems and tissues     HPV+ PAP -, normal paps ,  (no endo cells)     Epilepsy without status epilepticus, not intractable (H)     No Comments Provided     Gastritis without bleeding     06,treated      Other specified postprocedural states      (negative)     Tinea pedis     2010     Tobacco use     Attempts at cessation: electric cigarette, patches, chantix.       Past Surgical History:   Procedure Laterality Date     APPENDECTOMY OPEN      at age 16 for acute appendicitis      SECTION      x2     COLONOSCOPY  2013,lian - reported NL     OTHER SURGICAL HISTORY      11/10,84148.0,[place] LIGATE FALLOPIAN TUBE,uterine ablation, Dr. Kebede       Family History   Problem Relation Age of Onset     Colon Cancer Mother         Cancer-colon     Prostate Cancer Father         Cancer-prostate     Heart Disease Other         Heart Disease     Other - See Comments Other         COPD/Emphysema     Other - See Comments Other         Stroke     Breast Cancer Maternal Aunt         Cancer-breast       Social History     Socioeconomic History     Marital status: Single     Spouse name: Not on file     Number of children: Not on file     Years of education: Not  on file     Highest education level: Not on file   Occupational History     Not on file   Social Needs     Financial resource strain: Not on file     Food insecurity     Worry: Not on file     Inability: Not on file     Transportation needs     Medical: Not on file     Non-medical: Not on file   Tobacco Use     Smoking status: Current Every Day Smoker     Packs/day: 0.50     Years: 25.00     Pack years: 12.50     Types: Cigarettes     Smokeless tobacco: Never Used   Substance and Sexual Activity     Alcohol use: Yes     Comment: Alcoholic Drinks/day: occasional     Drug use: No     Sexual activity: Yes     Partners: Male   Lifestyle     Physical activity     Days per week: Not on file     Minutes per session: Not on file     Stress: Not on file   Relationships     Social connections     Talks on phone: Not on file     Gets together: Not on file     Attends Jewish service: Not on file     Active member of club or organization: Not on file     Attends meetings of clubs or organizations: Not on file     Relationship status: Not on file     Intimate partner violence     Fear of current or ex partner: Not on file     Emotionally abused: Not on file     Physically abused: Not on file     Forced sexual activity: Not on file   Other Topics Concern     Parent/sibling w/ CABG, MI or angioplasty before 65F 55M? Not Asked   Social History Narrative    Two children born by . Smokes 3/4 PPD.  Works at a liquor store.  Drinks rarely with no problems.  No drug use.  Lives with boyfriend and daughter.  Feels safe at home.    1 grandchild       Current Outpatient Medications   Medication Sig Dispense Refill     albuterol (PROAIR HFA) 108 (90 Base) MCG/ACT inhaler INHALE 2 PUFFS INTO THE lungs EVERY 4 HOURS AS NEEDED 54 g 0     clotrimazole-betamethasone (LOTRISONE) 1-0.05 % external cream Apply topically 2 times daily 45 g 0     EPINEPHrine (ANY BX GENERIC EQUIV) 0.3 MG/0.3ML injection 2-pack Inject 0.3 mLs (0.3 mg) into  "the muscle once as needed for anaphylaxis For allergic reaction 2 each 1     mometasone-formoterol (DULERA) 200-5 MCG/ACT inhaler Inhale 2 puffs into the lungs 2 times daily [facility] please 13 g 11     Spacer/Aero-Holding Chambers (AEROCHAMBER MV) MISC For home use. 1 each 0     varenicline (CHANTIX ARMANDO) 0.5 MG X 11 & 1 MG X 42 tablet Take 0.5 mg tab daily for 3 days, THEN 0.5 mg tab twice daily for 4 days, THEN 1 mg twice daily. [facility] please 53 tablet 0     varenicline (CHANTIX) 1 MG tablet Take 1 tablet (1 mg) by mouth 2 times daily 180 tablet 1     venlafaxine (EFFEXOR-XR) 150 MG 24 hr capsule TAKE 1 CAPSULE BY MOUTH DAILY WITH dinner 90 capsule 1     venlafaxine (EFFEXOR-XR) 75 MG 24 hr capsule Take 1 capsule (75 mg) by mouth daily 30 capsule 11       Allergies   Allergen Reactions     Tree Nuts  [Nuts] Anaphylaxis     Metronidazole Other (See Comments)     Pt can't remember.      Tetracycline Other (See Comments)     Pt can't remember.        REVIEW OF SYSTEMS:  Complete review of systems was obtained.  All pertinent positives and negatives as noted above.    PHYSICAL EXAM:  /80 (BP Location: Right arm, Cuff Size: Adult Regular)   Pulse 82   Temp 97.6  F (36.4  C)   Resp 18   Ht 1.588 m (5' 2.5\")   Wt 74.1 kg (163 lb 6.4 oz)   SpO2 96%   BMI 29.41 kg/m    CONSTITUTIONAL:  Alert,cooperative, NAD.  EYES: No scleral icterus.  PERRLA.  Conjunctiva clear.  ENT/MOUTH: External ears and nose normal.  TMs normal.  Moist mucous membranes. Oropharynx clear.    ENDO: No thyromegaly or thyroidnodules.  LYMPH:  No cervical or supraclavicular LA.    BREASTS: No skin abnormalities, no erythema.  No discrete masses.  No nipple discharge, no axillary, supra- or infraclavicular LA.   CARDIOVASCULAR: Regular,S1, S2.  No S3 or S4.  No murmur/gallop/rub.  No peripheral edema.  RESPIRATORY: CTA bilaterally, no wheezes, rhonchi or rales.  GI: Bowel sounds wnl.  Soft, nontender, nondistended.  No masses or HSM. "  No rebound orguarding.  : Vulva: normal, no lesions or discharge  Urethral meatus: normal size and location, no lesions or discharge  Urethra: no tenderness or masses  Bladder: no fullness or tenderness  Vagina: normalappearance, no abnormal discharge, no lesions.  No evidence of cystocele or rectocele.  Cervix: normal appearance, no lesions, no abnormal discharge, no cervical motion tenderness  Uterus: normal size and position,mobile, non-tender  Adnexa: no palpable masses bilaterally. No cervical motion tenderness.  Pap smear obtained: Yes  MSKEL: Grossly normal ROM.  Noclubbing.  INTEGUMENTARY:  Warm, dry.  No rash noted on exposed skin.  NEUROLOGIC: Facies symmetric.  Grossly normal movement and tone.  No tremor.  PSYCHIATRIC: Affect normal.  Speech fluent.      PHQ Depression Screen  PHQ-9 SCORE 1/22/2019 2/27/2020 7/9/2020   PHQ-9 Total Score 6 0 0       Labs:  Results for orders placed or performed in visit on 07/09/20   Lipid Panel     Status: Abnormal   Result Value Ref Range    Cholesterol 250 (H) <200 mg/dL    Triglycerides 179 (H) <150 mg/dL    HDL Cholesterol 59 23 - 92 mg/dL    LDL Cholesterol Calculated 155 (H) <100 mg/dL    Non HDL Cholesterol 191 (H) <130 mg/dL   CBC and Differential     Status: Abnormal   Result Value Ref Range    WBC 9.4 4.0 - 11.0 10e9/L    RBC Count 5.66 (H) 3.8 - 5.2 10e12/L    Hemoglobin 16.2 (H) 11.7 - 15.7 g/dL    Hematocrit 50.3 (H) 35.0 - 47.0 %    MCV 89 78 - 100 fl    MCH 28.6 26.5 - 33.0 pg    MCHC 32.2 31.5 - 36.5 g/dL    RDW 12.6 10.0 - 15.0 %    Platelet Count 366 150 - 450 10e9/L    Diff Method Automated Method     % Neutrophils 58.3 %    % Lymphocytes 32.9 %    % Monocytes 6.9 %    % Eosinophils 0.6 %    % Basophils 1.0 %    % Immature Granulocytes 0.3 %    Absolute Neutrophil 5.5 1.6 - 8.3 10e9/L    Absolute Lymphocytes 3.1 0.8 - 5.3 10e9/L    Absolute Monocytes 0.7 0.0 - 1.3 10e9/L    Absolute Eosinophils 0.1 0.0 - 0.7 10e9/L    Absolute Basophils 0.1 0.0 -  0.2 10e9/L    Abs Immature Granulocytes 0.0 0 - 0.4 10e9/L   Comprehensive Metabolic Panel     Status: None   Result Value Ref Range    Sodium 138 134 - 144 mmol/L    Potassium 4.6 3.5 - 5.1 mmol/L    Chloride 103 98 - 107 mmol/L    Carbon Dioxide 28 21 - 31 mmol/L    Anion Gap 7 3 - 14 mmol/L    Glucose 105 70 - 105 mg/dL    Urea Nitrogen 12 7 - 25 mg/dL    Creatinine 0.74 0.60 - 1.20 mg/dL    GFR Estimate 81 >60 mL/min/[1.73_m2]    GFR Estimate If Black >90 >60 mL/min/[1.73_m2]    Calcium 10.0 8.6 - 10.3 mg/dL    Bilirubin Total 0.6 0.3 - 1.0 mg/dL    Albumin 4.5 3.5 - 5.7 g/dL    Protein Total 7.5 6.4 - 8.9 g/dL    Alkaline Phosphatase 53 34 - 104 U/L    ALT 12 7 - 52 U/L    AST 16 13 - 39 U/L       ASSESSMENT AND PLAN:    1. Encounter for screening mammogram for breast cancer    2. Chronic obstructive pulmonary disease with acute exacerbation (H)    3. Encounter for tobacco use cessation counseling    4. Cervical cancer screening    5. Tree nut allergy    6. Routine history and physical examination of adult    7. Family history of colon cancer    8. Colon cancer screening    9. Anxiety    10. Lipid screening    11. Yeast dermatitis    12. Facial lesion      Mammogram ordered  Colonoscopy ordered  Pap smear obtained and results are pending  Added venlafaxine 75 mg to equal 225 mg daily to help with anxiety as well smoking cessation  Educated on proper use of Dulera twice daily as well as as needed albuterol use  Discussed smoking cessation and recommend Chantix.  She does have a stop date planned  She does not want to have referral at this time to dermatology or general surgery for mole removal from face.  She will follow-up if she needs this  Yeast dermatitis to chest, will treat with antifungal cream and follow-up as needed  Decreased libido, this time and recommend follow-up with gynecology for further evaluation management    Relevant cancer screening discussed.    Counseled on healthy diet, Calcium and  vitamin D intake, and exercise.    BLACK Car CNP ....................  7/9/2020   8:44 AM       This document was prepared using voice generated software.  While every attempt was made for accuracy, grammatical errors may exist.

## 2020-07-10 ASSESSMENT — ANXIETY QUESTIONNAIRES: GAD7 TOTAL SCORE: 19

## 2020-07-15 ENCOUNTER — TELEPHONE (OUTPATIENT)
Dept: FAMILY MEDICINE | Facility: OTHER | Age: 55
End: 2020-07-15

## 2020-07-15 NOTE — TELEPHONE ENCOUNTER
Patient has been called and messages left to schedule colonoscopy on 07/10 07/13 and 07/14.   Letter has been sent out today for her to call and schedule. Komal Figueredo on 7/15/2020 at 8:34 AM

## 2020-07-16 LAB
COPATH REPORT: NORMAL
PAP: NORMAL

## 2020-07-24 LAB
FINAL DIAGNOSIS: ABNORMAL
HPV HR 12 DNA CVX QL NAA+PROBE: POSITIVE
HPV16 DNA SPEC QL NAA+PROBE: NEGATIVE
HPV18 DNA SPEC QL NAA+PROBE: NEGATIVE
SPECIMEN DESCRIPTION: ABNORMAL
SPECIMEN SOURCE CVX/VAG CYTO: ABNORMAL

## 2020-07-28 DIAGNOSIS — F41.0 PANIC ATTACK: ICD-10-CM

## 2020-07-28 RX ORDER — VENLAFAXINE HYDROCHLORIDE 150 MG/1
CAPSULE, EXTENDED RELEASE ORAL
Qty: 30 CAPSULE | Refills: 0 | Status: SHIPPED | OUTPATIENT
Start: 2020-07-28 | End: 2020-07-31

## 2020-07-31 RX ORDER — VENLAFAXINE HYDROCHLORIDE 150 MG/1
CAPSULE, EXTENDED RELEASE ORAL
Qty: 90 CAPSULE | Refills: 0 | Status: SHIPPED | OUTPATIENT
Start: 2020-07-31 | End: 2020-12-02

## 2020-07-31 NOTE — TELEPHONE ENCOUNTER
"Requested Prescriptions   Pending Prescriptions Disp Refills     venlafaxine (EFFEXOR-XR) 150 MG 24 hr capsule [Pharmacy Med Name: venlafaxine  mg capsule,extended release 24 hr] 30 capsule 0     Sig: TAKE 1 CAPSULE BY MOUTH DAILY WITH dinner       Serotonin-Norepinephrine Reuptake Inhibitors  Passed - 7/28/2020  1:56 PM        Passed - Blood pressure under 140/90 in past 12 months     BP Readings from Last 3 Encounters:   07/09/20 126/80   02/27/20 120/80   09/30/19 138/82                 Passed - Recent (12 mo) or future (30 days) visit within the authorizing provider's specialty     Patient has had an office visit with the authorizing provider or a provider within the authorizing providers department within the previous 12 mos or has a future within next 30 days. See \"Patient Info\" tab in inbasket, or \"Choose Columns\" in Meds & Orders section of the refill encounter.              Passed - Medication is active on med list        Passed - Patient is age 18 or older        Passed - No active pregnancy on record        Passed - Normal serum creatinine on file in past 12 months     Recent Labs   Lab Test 07/09/20  0928   CR 0.74       Ok to refill medication if creatinine is low          Passed - No positive pregnancy test in past 12 months           Last Office Visit: 07/09/2020-Added venlafaxine 75 mg to equal 225 mg daily to help with anxiety as well smoking cessation  Future Office visit:       Prescription refilled per RN Medication RefillPolicy.................... Swapna Tavera RN ....................  7/31/2020   10:31 AM          "

## 2020-09-20 ENCOUNTER — TELEPHONE (OUTPATIENT)
Dept: FAMILY MEDICINE | Facility: OTHER | Age: 55
End: 2020-09-20

## 2020-09-20 ENCOUNTER — OFFICE VISIT (OUTPATIENT)
Dept: FAMILY MEDICINE | Facility: OTHER | Age: 55
End: 2020-09-20
Attending: FAMILY MEDICINE
Payer: COMMERCIAL

## 2020-09-20 VITALS
TEMPERATURE: 98.9 F | SYSTOLIC BLOOD PRESSURE: 120 MMHG | BODY MASS INDEX: 28.8 KG/M2 | DIASTOLIC BLOOD PRESSURE: 72 MMHG | OXYGEN SATURATION: 91 % | HEART RATE: 94 BPM | WEIGHT: 160 LBS | RESPIRATION RATE: 16 BRPM

## 2020-09-20 DIAGNOSIS — J20.9 ACUTE BRONCHITIS, UNSPECIFIED ORGANISM: Primary | ICD-10-CM

## 2020-09-20 DIAGNOSIS — Z12.11 SPECIAL SCREENING FOR MALIGNANT NEOPLASMS, COLON: ICD-10-CM

## 2020-09-20 DIAGNOSIS — Z12.31 ENCOUNTER FOR SCREENING MAMMOGRAM FOR BREAST CANCER: Primary | ICD-10-CM

## 2020-09-20 DIAGNOSIS — Z71.6 ENCOUNTER FOR TOBACCO USE CESSATION COUNSELING: ICD-10-CM

## 2020-09-20 PROCEDURE — C9803 HOPD COVID-19 SPEC COLLECT: HCPCS

## 2020-09-20 PROCEDURE — 99214 OFFICE O/P EST MOD 30 MIN: CPT | Performed by: FAMILY MEDICINE

## 2020-09-20 PROCEDURE — U0003 INFECTIOUS AGENT DETECTION BY NUCLEIC ACID (DNA OR RNA); SEVERE ACUTE RESPIRATORY SYNDROME CORONAVIRUS 2 (SARS-COV-2) (CORONAVIRUS DISEASE [COVID-19]), AMPLIFIED PROBE TECHNIQUE, MAKING USE OF HIGH THROUGHPUT TECHNOLOGIES AS DESCRIBED BY CMS-2020-01-R: HCPCS | Mod: ZL | Performed by: FAMILY MEDICINE

## 2020-09-20 RX ORDER — ALBUTEROL SULFATE 90 UG/1
2 AEROSOL, METERED RESPIRATORY (INHALATION) EVERY 6 HOURS
Qty: 1 INHALER | Refills: 1 | Status: SHIPPED | OUTPATIENT
Start: 2020-09-20 | End: 2022-09-13

## 2020-09-20 RX ORDER — PREDNISONE 10 MG/1
TABLET ORAL
Qty: 30 TABLET | Refills: 0 | Status: SHIPPED | OUTPATIENT
Start: 2020-09-20 | End: 2022-05-10

## 2020-09-20 RX ORDER — VARENICLINE TARTRATE 1 MG/1
1 TABLET, FILM COATED ORAL 2 TIMES DAILY
Qty: 180 TABLET | Refills: 1 | Status: CANCELLED | OUTPATIENT
Start: 2020-09-20

## 2020-09-20 ASSESSMENT — ANXIETY QUESTIONNAIRES
7. FEELING AFRAID AS IF SOMETHING AWFUL MIGHT HAPPEN: MORE THAN HALF THE DAYS
IF YOU CHECKED OFF ANY PROBLEMS ON THIS QUESTIONNAIRE, HOW DIFFICULT HAVE THESE PROBLEMS MADE IT FOR YOU TO DO YOUR WORK, TAKE CARE OF THINGS AT HOME, OR GET ALONG WITH OTHER PEOPLE: SOMEWHAT DIFFICULT
GAD7 TOTAL SCORE: 12
1. FEELING NERVOUS, ANXIOUS, OR ON EDGE: MORE THAN HALF THE DAYS
6. BECOMING EASILY ANNOYED OR IRRITABLE: NOT AT ALL
5. BEING SO RESTLESS THAT IT IS HARD TO SIT STILL: MORE THAN HALF THE DAYS
3. WORRYING TOO MUCH ABOUT DIFFERENT THINGS: NEARLY EVERY DAY
2. NOT BEING ABLE TO STOP OR CONTROL WORRYING: MORE THAN HALF THE DAYS

## 2020-09-20 ASSESSMENT — PATIENT HEALTH QUESTIONNAIRE - PHQ9: 5. POOR APPETITE OR OVEREATING: SEVERAL DAYS

## 2020-09-20 ASSESSMENT — PAIN SCALES - GENERAL: PAINLEVEL: NO PAIN (0)

## 2020-09-20 NOTE — PATIENT INSTRUCTIONS
Patient Education     What Is Acute Bronchitis?  Acute bronchitis is when the airways in your lungs (bronchial tubes) becomered and swollen (inflamed). It is usually caused by a viral infection. But it can also occur because of a bacteria or allergen. Symptoms include a cough that produces yellow or greenish mucus and can last for days or sometimes weeks.  Lungs with bronchitis  Bronchitis often occurs with a cold or the flu virus. The airways become inflamed (red and swollen). There is a deep hacking cough from the extra mucus. Other symptoms may include:    Wheezing    Chest discomfort    Shortness of breath    Mild fever  A second infection, this time due to bacteria, may then occur. And airways irritated by allergens or smoke are more likely to get infected.  Making a diagnosis  A physical exam, health history, and certain tests help your healthcare provider make the diagnosis.  Health history  Your healthcare provider will ask you about previous illnesses and your current symptoms. You will also be asked about what medications you currently take, including your use of over-the-counter drugs, vitamins, herbs, and supplements.  The exam  Your provider listens to your chest for signs of congestion. He or she may also check your ears, nose, and throat.  Possible tests    A sputum test for bacteria. This requires a sample of mucus from your lungs.    A nasal or throat swab. This tests to see if you have a bacterial or viral infection.    A chest X-ray. This is done if your healthcare provider thinks you have pneumonia.    Tests to check for an underlying condition. Other tests may be done to check for things such as allergies, asthma, or COPD (chronic obstructive pulmonary disease). You may need to see a specialist for more lung function testing.    Treating a cough  The main treatment for bronchitis is easing symptoms. Avoiding smoke, allergens, and other things that trigger coughing can often help. If the  infection is bacterial, you may be given antibiotics. During the illness, it's important to get plenty of sleep. To ease symptoms:    Don t smoke. Also avoid secondhand smoke.    Use a humidifier. Or try breathing in steam from a hot shower. This may help loosen mucus.    Drink a lot of water and juice. They can soothe the throat and may help thin mucus.    Sit up or use extra pillows when in bed. This helps to lessen coughing and congestion.    Ask your provider about using medicine. Ask about using cough medicine, pain and fever medicine, or a decongestant.  Antibiotics  Most cases of bronchitis are caused by cold or flu viruses. They don t need antibiotics to treat them, even if your mucus is thick and green or yellow. Antibiotics don t treat viral illness and antibiotics have not been shown to have any benefit in cases of acute bronchitis. Taking antibiotics when they are not needed increases your risk of getting an infection later that is antibiotic-resistant. Antibiotics can also cause severe cases of diarrhea that require other antibiotics to treat.  It is important that you accept your healthcare provider's opinion to not use antibiotics. Your provider will prescribe antibiotics if the infection is caused by bacteria. If they are prescribed:    Take all of the medicine. Take the medicine until it is used up, even if symptoms have improved. If you don t, the bronchitis may come back.    Take the medicines as directed. For instance, some medicines should be taken with food.    Ask about side effects. Ask your provider or pharmacist what side effects are common, and what to do about them.  Follow-up care  You should see your provider again in 2 to 3 weeks. By this time, symptoms should have improved. An infection that lasts longer may mean you have a more serious problem.  Prevention    Avoid tobacco smoke. If you smoke, quit. Stay away from smoky places. Ask friends and family not to smoke around you, or in  your home or car.    Get checked for allergies.    Ask your provider about getting a yearly flu shot. Also ask about pneumococcal or pneumonia shots.    Wash your hands often. This helps reduce the chance of picking up viruses that cause colds and flu.    Contact your healthcare provider immediately if:    Symptoms worsen, or you have new symptoms    Breathing problems worsen    Symptoms don t get better within a week, or within 3 days of taking antibioticsCall 911 if you are:    wheezing    feeling lightheaded or dizzy    unable to talk    skin or nails looks blue or pale    Inside healthy lungs    Air travels in and out of the lungs through the airways. The linings of these airways produce sticky mucus. This mucus traps particles that enter the lungs. Tiny structures called cilia then sweep the particles out of the airways.     Healthy airway: Airways are normally open. Air moves in and out easily.      Healthy cilia: Tiny, hairlike cilia sweep mucus and particles up and out of the airways.        Inflamed airway: Inflammation and extra mucus narrow the airway, causing shortness of breath.      Impaired cilia: Extra mucus impairs cilia, causing congestion and wheezing. Smoking makes the problem worse.     Date Last Reviewed: 2/1/2017 2000-2019 [employer]. [address] [place] [address]. All rights reserved. This information is not intended as a substitute for professional medical care. Always follow your healthcare professional's instructions.           Patient Education     Viral or Bacterial Bronchitis with Wheezing (Adult)    Bronchitis is an infection of the air passages. It often occurs during a cold and is usually caused by a virus. Symptoms include cough with mucus (phlegm) and low-grade fever. This illness is contagious during the first few days and is spread through the air by coughing and sneezing, or by direct contact (touching the sick person and then touching your own  eyes, nose, or mouth).  If there is a lot of inflammation, air flow is restricted. The air passages may also go into spasm, especially if you have asthma. This causes wheezing and difficulty breathing even in people who do not have asthma.  Bronchitis usually lasts 7 to 14 days. The wheezing should improve with treatment during the first week. An inhaler is often prescribed to relax the air passages and stop wheezing. Antibiotics will be prescribed if your doctor thinks there is also a secondary bacterial infection.  Home care    If symptoms are severe, rest at home for the first 2 to 3 days. When you go back to your usual activities, don't let yourself get too tired.    Dont s'moke. Also avoid being exposed to secondhand smoke.    You may use over-the-counter medicine to control fever or pain, unless another medicine was prescribed. Note: If you have chronic liver or kidney disease or have ever had a stomach ulcer or gastrointestinal bleeding, talk with your healthcare provider before using these medicines. Also talk to your provider if you are taking medicine to prevent blood clots.) Aspirin should never be given to anyone younger than 18 years of age who is ill with a viral infection or fever. It may cause severe liver or brain damage.    Your appetite may be poor, so a light diet is fine. Stay well hydrated by drinking 6 to 8 glasses of fluids per day (such as water, soft drinks, sports drinks, juices, tea, or soup). Extra fluids will help loosen secretions in the nose and lungs.    Over-the-counter cough, cold, and sore-throat medicines will not shorten the length of the illness, but they may be helpful to reduce symptoms. (Note: Don't use decongestants if you have high blood pressure.)    If you were given an inhaler, use it exactly as directed. If you need to use it more often than prescribed, your condition may be worsening. If this happens, contact your healthcare provider.    If prescribed, finish all  antibiotic medicine, even if you are feeling better after only a few days.  Follow-up care  Follow up with your healthcare provider, or as advised. If you had an X-ray or ECG (electrocardiogram), a specialist will review it. You will be notified of any new findings that may affect your care.  If you are age 65 or older, or if you have a chronic lung disease or condition that affects your immune system, or you smoke, ask your healthcare provider about getting a pneumococcal vaccine and a yearly flu shot (influenza vaccine).  When to seek medical advice  Call your healthcare provider right away if any of these occur:    Fever of 100.4 F (38 C) or higher, or as directed by your healthcare provider    Coughing up increasing amounts of colored sputum    Weakness, drowsiness, headache, facial pain, ear pain, or a stiff neck  Call 911  Call 911 if any of these occur.    Coughing up blood    Worsening weakness, drowsiness, headache, or stiff neck    Increased wheezing not helped with medication, shortness of breath, or pain with breathing  Date Last Reviewed: 6/1/2018 2000-2019 [employer]. [address] [place] [address]. All rights reserved. This information is not intended as a substitute for professional medical care. Always follow your healthcare professional's instructions.           Patient Education     Bronchitis, Antibiotic Treatment (Adult)    Bronchitis is an infection of the air passages (bronchial tubes) in your lungs. It often occurs when you have a cold. This illness is contagious during the first few days and is spread through the air by coughing and sneezing, or by direct contact (touching the sick person and then touching your own eyes, nose, or mouth).  Symptoms of bronchitis include cough with mucus (phlegm) and low-grade fever. Bronchitis usually lasts 7 to 14 days. Mild cases can be treated with simple home remedies. More severe infection is treated with an antibiotic.  Home  care  Follow these guidelines when caring for yourself at home:    If your symptoms are severe, rest at home for the first 2 to 3 days. When you go back to your usual activities, don't let yourself get too tired.    Don't smoke. Also stay away from secondhand smoke.    You may use over-the-counter medicines to control fever or pain, unless another medicine was prescribed. If you have chronic liver or kidney disease or have ever had a stomach ulcer or gastrointestinal bleeding, talk with your healthcare provider before using these medicines. Also talk to your provider if you are taking medicine to prevent blood clots. Aspirin should never be given to anyone younger than 18 who is ill with a viral infection or fever. It may cause severe liver or brain damage.    Your appetite may be low, so a light diet is fine. Stay well hydrated by drinking 6 to 8 glasses of fluids per day. This includes water, soft drinks, sports drinks, juices, tea, or soup. Extra fluids will help loosen mucus in your nose and lungs.    Over-the-counter cough, cold, and sore-throat medicines will not shorten the length of the illness, but they may be helpful to reduce your symptoms. Don't use decongestants if you have high blood pressure.    Finish all antibiotic medicine. Do this even if you are feeling better after only a few days.  Follow-up care  Follow up with your healthcare provider, or as advised. If you had an X-ray or ECG (electrocardiogram), a specialist will review it. You will be told of any new test results that may affect your care.  If you are age 65 or older, if you smoke, or if you have a chronic lung disease or condition that affects your immune system, ask your healthcare provider about getting a pneumococcal vaccine and a yearly flu shot (influenza vaccine).  When to seek medical advice  Call your healthcare provider right away if any of these occur:    Fever of 100.4 F (38 C) or higher, or as directed by your healthcare  provider    Coughing up more sputum    Weakness, drowsiness, headache, facial pain, ear pain, or a stiff neck  Call 911  Call 911 if any of these occur.    Coughing up blood    Weakness, drowsiness, headache, or stiff neck that get worse    Trouble breathing, wheezing, or pain with breathing  Date Last Reviewed: 6/1/2018 2000-2019 [employer]. [address] [place] [address]. All rights reserved. This information is not intended as a substitute for professional medical care. Always follow your healthcare professional's instructions.

## 2020-09-20 NOTE — NURSING NOTE
Patient here for shortness of breath. She states she has had a cold since Monday (congestion, sneezing, stuffy nose) and then on Friday she sates it moved into her chest and now she is having a hardtime breathing. Pt states she gets pneumonia easily. She has not had a fever.      Medication Reconciliation: terry Sofia LPN  9/20/2020 10:21 AM

## 2020-09-20 NOTE — TELEPHONE ENCOUNTER
Patient presented in [facility] on 9/20/20 and was asking about resulted labs from 7/9/2020. When looking in chart, the labs appear resulted but pt was never called nor were they released to [facility] because patient has not yet activated it.   Patient requesting that she is called with results and what she should do with them. Pt saw (when I was looking for resulted labs) that her cholesterol was high. She is aksing if this is due to the change in her diet? She would like advise on how to lower this?      Pt also stated they discussed having both a mammogram and colonoscopy done but has not heard anything in regards to follow up from these.   Pt would like a call.  Luisa Marco was sent this message as DMO is out of office.     Rickey Sofia LPN on 9/20/2020 at 10:38 AM

## 2020-09-20 NOTE — PROGRESS NOTES
CC: Cough wheezing    HPI: 55-year-old lady who presents because of increasing respiratory difficulty for 4 to 5 days and particularly worse the last 2 days.  She is a smoker.  She has a history of recurring asthma difficulties also.  She does use inhalers on occasion also.  Patient is not thinking she had fever or chills.  Cough is minimally productive.  Minimal sore throat.  No ear symptoms.  No chest pain.  No exposure to COVID that she knows of.  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    PMH: Respiratory problems include asthma allergic rhinitis  Allergies: Metronidazole and tetracycline  Medications: Have included inhalers, Effexor    ROS: Comprehensively unremarkable other than what is already been noted in the HPI.  No GI or  symptoms    Exam: Alert cooperative lady who does not appear to be in any distress.  O2 sats are only 91%, heart rate in the 90s and she is afebrile with normal blood pressure and  respiratory rate  HEENT: Injection of the eyes, TMs are well visualized and are normal, throat is clear, nares are clear  Neck: Supple no adenopathy  Chest: Rhonchi and wheezes throughout  Cardiovascular: Regular rate : Soft nontender  Lower extremities: No edema  Dermatologic exam grossly normal  Orthopedic and neurological exam grossly normal    Assessment: Bronchitis with hyperactive airway and chronic tobacco use    Plan: Patient is not interested in laboratory or radiologic studies at this point in time and therefore will proceed with appropriate measures considering her wheezing.  She will be given prednisone in a tapering schedule over 10 days with a burst to initial treatment.  She will be covered somewhat prophylactically with broad-spectrum antibiotic, Augmentin, and a refill of albuterol inhaler.  She understands importance of smoking cessation and will discuss this with her primary care provider when she returns for recheck in a week.  Information regarding  bronchitis and use of antibiotics and discussion did occur about this might be a viral illness and the most important endeavor that she can do is smoking cessation.

## 2020-09-21 LAB
SARS-COV-2 RNA SPEC QL NAA+PROBE: NOT DETECTED
SPECIMEN SOURCE: NORMAL

## 2020-09-21 ASSESSMENT — ANXIETY QUESTIONNAIRES: GAD7 TOTAL SCORE: 12

## 2020-09-21 NOTE — TELEPHONE ENCOUNTER
Please notify patient that a new order for mammogram and colonoscopy have been sent in. Her blood counts, kidney and liver functions, electrolytes, and pap smear were all negative. She did test positive for a strain of HPV, but we will just continue to watch and her pap smear was negative this time. Her cholesterol and triglycerides were elevated, however her total 10 year heart risk score is only at 5% and we do not need to start cholesterol lowering medications until it reaches 7.5% or higher.     Luisa Lara PA-C on 9/21/2020 at 8:20 AM

## 2020-09-28 ENCOUNTER — TELEPHONE (OUTPATIENT)
Dept: FAMILY MEDICINE | Facility: OTHER | Age: 55
End: 2020-09-28

## 2020-09-28 NOTE — TELEPHONE ENCOUNTER
Patient has been called and messages left to schedule colonoscopy on 09/22 09/24 and 09/25/2020 .  Letter has been sent out today for her to call and schedule. Komal Figueredo on 9/28/2020 at 9:22 AM

## 2020-11-13 ENCOUNTER — HOSPITAL ENCOUNTER (OUTPATIENT)
Dept: MAMMOGRAPHY | Facility: OTHER | Age: 55
Discharge: HOME | End: 2020-11-13
Attending: PHYSICIAN ASSISTANT | Admitting: PHYSICIAN ASSISTANT
Payer: COMMERCIAL

## 2020-11-13 DIAGNOSIS — Z12.31 ENCOUNTER FOR SCREENING MAMMOGRAM FOR BREAST CANCER: ICD-10-CM

## 2020-11-13 PROCEDURE — 77067 SCR MAMMO BI INCL CAD: CPT

## 2020-12-02 DIAGNOSIS — F41.0 PANIC ATTACK: ICD-10-CM

## 2020-12-02 RX ORDER — VENLAFAXINE HYDROCHLORIDE 150 MG/1
CAPSULE, EXTENDED RELEASE ORAL
Qty: 90 CAPSULE | Refills: 0 | Status: SHIPPED | OUTPATIENT
Start: 2020-12-02 | End: 2021-02-24

## 2020-12-02 NOTE — TELEPHONE ENCOUNTER
[facility] sent Rx request for the following:      venlafaxine  mg capsule,extended release 24 hr  Sig: TAKE 1 CAPSULE BY MOUTH DAILY WITH dinner      Last Prescription Date:   7/31/2020  Last Fill Qty/Refills:         90, R-0    Last Office Visit:              7/9/2020   Future Office visit:           none    Prescription refilled per RN Medication Refill Policy.................... Clemente García RN ....................  12/2/2020   9:24 AM

## 2021-02-22 DIAGNOSIS — J44.1 CHRONIC OBSTRUCTIVE PULMONARY DISEASE WITH ACUTE EXACERBATION (H): ICD-10-CM

## 2021-02-22 DIAGNOSIS — F41.0 PANIC ATTACK: Primary | ICD-10-CM

## 2021-02-22 NOTE — LETTER
February 24, 2021      [address] [place] [address]        Dear Puja,     This letter is to remind you that you are due for your 6-month exam with Kayley George.     A LIMITED refill of albuterol (PROAIR HFA) 108 (90 base) mcg/act inhaler has been called into your pharmacy. Additional refills require you to complete this appointment.    Please call the clinic at [phone] to schedule your appointment.    If you should require additional refills before your scheduled appointment, please contact your pharmacy and we will refill your medication until the date of your appointment.    Thank you for choosing [facility] for your health care needs.    Sincerely,    Refill RN  [facility]

## 2021-02-24 RX ORDER — VENLAFAXINE HYDROCHLORIDE 150 MG/1
CAPSULE, EXTENDED RELEASE ORAL
Qty: 90 CAPSULE | Refills: 0 | Status: SHIPPED | OUTPATIENT
Start: 2021-02-24 | End: 2021-04-16

## 2021-02-24 RX ORDER — ALBUTEROL SULFATE 90 UG/1
AEROSOL, METERED RESPIRATORY (INHALATION)
Qty: 54 G | Refills: 0 | Status: SHIPPED | OUTPATIENT
Start: 2021-02-24 | End: 2021-04-16

## 2021-02-24 NOTE — TELEPHONE ENCOUNTER
[facility] sent Rx request for the following:      Requested Prescriptions   Pending Prescriptions Disp Refills   venlafaxine (EFFEXOR-XR) 150 MG 24 hr capsule [Pharmacy Med Name: venlafaxine  mg capsule,extended release 24 hr] 90 capsule 0    Sig: TAKE 1 CAPSULE BY MOUTH DAILY WITH dinner   Last Prescription Date:   12/2/20  Last Fill Qty/Refills:         90, R-0      albuterol (PROAIR HFA) 108 (90 Base) MCG/ACT inhaler [Pharmacy Med Name: ProAir HFA 90 mcg/actuation aerosol inhaler] 51 g     Sig: INHALE 2 PUFFS INTO THE lungs EVERY 4 HOURS AS NEEDED   Last Prescription Date:   7/6/20  Last Fill Qty/Refills:         54g, R-0      Asthma Maintenance Inhalers - Anticholinergics Failed - 2/24/2021  8:44 AM       Failed - Asthma control assessment score within normal limits in last 6 months      Short-Acting Beta Agonist Inhalers Protocol  Failed - 2/24/2021  8:44 AM       Failed - Asthma control assessment score within normal limits in last 6 months     Last Office Visit:              7/9/20 (Physical)  Future Office visit:           None    Per refill protocol, Pt was due for 6-month asthma check-up around 1/9/21. Reminder letter sent to Pt. Prescription approved per [facility] Refill Protocol for 90-day roddy refill of albuterol. Effexor approved for 90 days at this time as well. Shohsana Gonzalez RN .............. 2/24/2021  8:54 AM

## 2021-04-16 ENCOUNTER — OFFICE VISIT (OUTPATIENT)
Dept: FAMILY MEDICINE | Facility: OTHER | Age: 56
End: 2021-04-16
Attending: NURSE PRACTITIONER
Payer: COMMERCIAL

## 2021-04-16 VITALS
RESPIRATION RATE: 16 BRPM | HEIGHT: 62 IN | WEIGHT: 155.4 LBS | OXYGEN SATURATION: 95 % | BODY MASS INDEX: 28.6 KG/M2 | TEMPERATURE: 97.3 F | DIASTOLIC BLOOD PRESSURE: 82 MMHG | HEART RATE: 80 BPM | SYSTOLIC BLOOD PRESSURE: 128 MMHG

## 2021-04-16 DIAGNOSIS — F41.0 PANIC ATTACK: ICD-10-CM

## 2021-04-16 DIAGNOSIS — F17.200 TOBACCO USE DISORDER: ICD-10-CM

## 2021-04-16 DIAGNOSIS — Z80.0 FAMILY HISTORY OF COLON CANCER: ICD-10-CM

## 2021-04-16 DIAGNOSIS — J44.1 CHRONIC OBSTRUCTIVE PULMONARY DISEASE WITH ACUTE EXACERBATION (H): ICD-10-CM

## 2021-04-16 DIAGNOSIS — Z12.11 COLON CANCER SCREENING: Primary | ICD-10-CM

## 2021-04-16 PROCEDURE — 99214 OFFICE O/P EST MOD 30 MIN: CPT | Performed by: NURSE PRACTITIONER

## 2021-04-16 RX ORDER — AMOXICILLIN 500 MG/1
CAPSULE ORAL
COMMUNITY
Start: 2021-04-12 | End: 2022-05-10

## 2021-04-16 RX ORDER — ALBUTEROL SULFATE 90 UG/1
AEROSOL, METERED RESPIRATORY (INHALATION)
Qty: 54 G | Refills: 11 | Status: SHIPPED | OUTPATIENT
Start: 2021-04-16 | End: 2022-04-28

## 2021-04-16 RX ORDER — VENLAFAXINE HYDROCHLORIDE 150 MG/1
CAPSULE, EXTENDED RELEASE ORAL
Qty: 90 CAPSULE | Refills: 3 | Status: SHIPPED | OUTPATIENT
Start: 2021-04-16 | End: 2022-07-08

## 2021-04-16 SDOH — HEALTH STABILITY: MENTAL HEALTH: HOW MANY STANDARD DRINKS CONTAINING ALCOHOL DO YOU HAVE ON A TYPICAL DAY?: NOT ASKED

## 2021-04-16 SDOH — HEALTH STABILITY: MENTAL HEALTH: HOW OFTEN DO YOU HAVE A DRINK CONTAINING ALCOHOL?: 2-4 TIMES A MONTH

## 2021-04-16 SDOH — HEALTH STABILITY: MENTAL HEALTH: HOW OFTEN DO YOU HAVE 6 OR MORE DRINKS ON ONE OCCASION?: NOT ASKED

## 2021-04-16 ASSESSMENT — PAIN SCALES - GENERAL: PAINLEVEL: NO PAIN (0)

## 2021-04-16 ASSESSMENT — MIFFLIN-ST. JEOR: SCORE: 1253.14

## 2021-04-16 NOTE — PROGRESS NOTES
[facility]  [address]  [place] [address]  Phone: [phone]  Fax: [phone]  Primary Provider: Kayley German  Pre-op Performing Provider: KAYLEY GERMAN      PREOPERATIVE EVALUATION:  Today's date: 4/16/2021    Puja Ocasio is a 55 year old female who presents for a preoperative evaluation.    Surgical Information:  Surgery/Procedure: Colonoscopy  Surgery Location: [facility]  Surgeon: Dr. Saxena  Surgery Date: 04/22/2021  Time of Surgery: TBD  Where patient plans to recover: At home with family  Fax number for surgical facility: [facility]    Type of Anesthesia Anticipated: to be determined    Assessment & Plan     The proposed surgical procedure is considered LOW risk.    Colon cancer screening  Colon cancer screening, colonoscopy next week.  Referral was placed to surgical center as well as surgeon.  - GASTROENTEROLOGY ADULT REF PROCEDURE ONLY    Chronic obstructive pulmonary disease with acute exacerbation (H)  Stable, no further action needed  - albuterol (PROAIR HFA) 108 (90 Base) MCG/ACT inhaler  Dispense: 54 g; Refill: 11    Panic attack  Stable, no further action needed  - venlafaxine (EFFEXOR-XR) 150 MG 24 hr capsule  Dispense: 90 capsule; Refill: 3    Tobacco use disorder  Declines smoking cessation    Family history of colon cancer  Colon cancer screening  - GASTROENTEROLOGY ADULT REF PROCEDURE ONLY      Risks and Recommendations:  The patient has the following additional risks and recommendations for perioperative complications:   - No identified additional risk factors other than previously addressed    Medication Instructions:  Patient is to take all scheduled medications on the day of surgery    RECOMMENDATION:  APPROVAL GIVEN to proceed with proposed procedure, without further diagnostic evaluation.    Subjective     HPI related to upcoming procedure: Patient is having colonoscopy on 422.  This is screening  colonoscopy.  Overall she is stable with no health concerns at this time.      Preop Questions 4/16/2021   1. Have you ever had a heart attack or stroke? No   2. Have you ever had surgery on your heart or blood vessels, such as a stent placement, a coronary artery bypass, or surgery on an artery in your head, neck, heart, or legs? No   3. Do you have chest pain with activity? No   4. Do you have a history of  heart failure? No   5. Do you currently have a cold, bronchitis or symptoms of other infection? No   6. Do you have a cough, shortness of breath, or wheezing? No   7. Do you or anyone in your family have previous history of blood clots? No   8. Do you or does anyone in your family have a serious bleeding problem such as prolonged bleeding following surgeries or cuts? No   9. Have you ever had problems with anemia or been told to take iron pills? No   10. Have you had any abnormal blood loss such as black, tarry or bloody stools, or abnormal vaginal bleeding? No   11. Have you ever had a blood transfusion? No   12. Are you willing to have a blood transfusion if it is medically needed before, during, or after your surgery? Yes   13. Have you or any of your relatives ever had problems with anesthesia? No   14. Do you have sleep apnea, excessive snoring or daytime drowsiness? YES -    14a. Do you have a CPAP machine? No   15. Do you have any artifical heart valves or other implanted medical devices like a pacemaker, defibrillator, or continuous glucose monitor? No   16. Do you have artificial joints? No   17. Are you allergic to latex? No   18. Is there any chance that you may be pregnant? No     Health Care Directive:  Patient does not have a Health Care Directive or Living Will: Discussed advance care planning with patient; however, patient declined at this time.    Preoperative Review of :   reviewed - no record of controlled substances prescribed.      Status of Chronic Conditions:  See problem list for  active medical problems.  Problems all longstanding and stable, except as noted/documented.  See ROS for pertinent symptoms related to these conditions.      Review of Systems  Constitutional, neuro, ENT, endocrine, pulmonary, cardiac, gastrointestinal, genitourinary, musculoskeletal, integument and psychiatric systems are negative, except as otherwise noted.    Patient Active Problem List    Diagnosis Date Noted     Allergic rhinitis 2018     Priority: Medium     Overview:   possible with sinus congestion       Panic attack 2018     Priority: Medium     Asthma 2015     Priority: Medium     Tobacco use disorder 01/10/2011     Priority: Medium      Past Medical History:   Diagnosis Date     Abnormal cytological findings in specimens from other organs, systems and tissues     HPV+ PAP -, normal paps ,  (no endo cells)     Epilepsy without status epilepticus, not intractable (H)     No Comments Provided     Gastritis without bleeding     06,treated      Other specified postprocedural states      (negative)     Tinea pedis     2010     Tobacco use     Attempts at cessation: electric cigarette, patches, chantix.     Past Surgical History:   Procedure Laterality Date     APPENDECTOMY OPEN      at age 16 for acute appendicitis      SECTION      x2     COLONOSCOPY  2013,lian - reported NL     OTHER SURGICAL HISTORY      11/10,77562.0,ID LIGATE FALLOPIAN TUBE,uterine ablation, Dr. Kebede     Current Outpatient Medications   Medication Sig Dispense Refill     albuterol (PROAIR HFA) 108 (90 Base) MCG/ACT inhaler INHALE 2 PUFFS INTO THE lungs EVERY 4 HOURS AS NEEDED 54 g 0     albuterol (PROAIR HFA/PROVENTIL HFA/VENTOLIN HFA) 108 (90 Base) MCG/ACT inhaler Inhale 2 puffs into the lungs every 6 hours 1 Inhaler 1     clotrimazole-betamethasone (LOTRISONE) 1-0.05 % external cream Apply topically 2 times daily 45 g 0     EPINEPHrine (ANY BX GENERIC EQUIV)  0.3 MG/0.3ML injection 2-pack Inject 0.3 mLs (0.3 mg) into the muscle once as needed for anaphylaxis For allergic reaction 2 each 1     mometasone-formoterol (DULERA) 200-5 MCG/ACT inhaler Inhale 2 puffs into the lungs 2 times daily Noemi Care please 13 g 11     predniSONE (DELTASONE) 10 MG tablet 2 twice a day for 4 days, then 1 once a day for 2 days then 1 a day 30 tablet 0     Spacer/Aero-Holding Chambers (AEROCHAMBER MV) MISC For home use. 1 each 0     varenicline (CHANTIX ARMANDO) 0.5 MG X 11 & 1 MG X 42 tablet Take 0.5 mg tab daily for 3 days, THEN 0.5 mg tab twice daily for 4 days, THEN 1 mg twice daily. Noemi Care please (Patient not taking: Reported on 9/20/2020) 53 tablet 0     varenicline (CHANTIX) 1 MG tablet Take 1 tablet (1 mg) by mouth 2 times daily (Patient not taking: Reported on 9/20/2020) 180 tablet 1     venlafaxine (EFFEXOR-XR) 150 MG 24 hr capsule TAKE 1 CAPSULE BY MOUTH DAILY WITH dinner 90 capsule 0     venlafaxine (EFFEXOR-XR) 75 MG 24 hr capsule Take 1 capsule (75 mg) by mouth daily 30 capsule 11       Allergies   Allergen Reactions     Tree Nuts  [Nuts] Anaphylaxis     Metronidazole Other (See Comments)     Pt can't remember.      Tetracycline Other (See Comments)     Pt can't remember.         Social History     Tobacco Use     Smoking status: Current Every Day Smoker     Packs/day: 0.50     Years: 25.00     Pack years: 12.50     Types: Cigarettes     Smokeless tobacco: Never Used   Substance Use Topics     Alcohol use: Yes     Comment: Alcoholic Drinks/day: occasional     Family History   Problem Relation Age of Onset     Colon Cancer Mother         Cancer-colon     Prostate Cancer Father         Cancer-prostate     Heart Disease Other         Heart Disease     Other - See Comments Other         COPD/Emphysema     Other - See Comments Other         Stroke     Breast Cancer Maternal Aunt         Cancer-breast     History   Drug Use No         Objective     There were no vitals taken for  this visit.    Physical Exam    GENERAL APPEARANCE: healthy, alert and no distress     EYES: EOMI, PERRL     HENT: ear canals and TM's normal and nose and mouth without ulcers or lesions     NECK: no adenopathy, no asymmetry, masses, or scars and thyroid normal to palpation     RESP: lungs clear to auscultation - no rales, rhonchi or wheezes     CV: regular rates and rhythm, normal S1 S2, no S3 or S4 and no murmur, click or rub     ABDOMEN:  soft, nontender, no HSM or masses and bowel sounds normal     MS: extremities normal- no gross deformities noted, no evidence of inflammation in joints, FROM in all extremities.     SKIN: no suspicious lesions or rashes     NEURO: Normal strength and tone, sensory exam grossly normal, mentation intact and speech normal     PSYCH: mentation appears normal. and affect normal/bright     LYMPHATICS: No cervical adenopathy    Recent Labs   Lab Test 07/09/20  0928   HGB 16.2*         POTASSIUM 4.6   CR 0.74        Diagnostics:  No labs were ordered during this visit.   No EKG required for low risk surgery (cataract, skin procedure, breast biopsy, etc).    Revised Cardiac Risk Index (RCRI):  The patient has the following serious cardiovascular risks for perioperative complications:   - No serious cardiac risks = 0 points     RCRI Interpretation: 0 points: Class I (very low risk - 0.4% complication rate)           Signed Electronically by: BLACK Car CNP  Copy of this evaluation report is provided to requesting physician.

## 2021-04-16 NOTE — NURSING NOTE
"Chief Complaint   Patient presents with     Pre-Op Exam     Patient presents to clinic for pre-op.    Initial /82 (BP Location: Right arm, Patient Position: Sitting, Cuff Size: Adult Regular)   Pulse 80   Temp 97.3  F (36.3  C) (Tympanic)   Resp 16   Ht 1.575 m (5' 2\")   Wt 70.5 kg (155 lb 6.4 oz)   SpO2 95%   BMI 28.42 kg/m   Estimated body mass index is 28.42 kg/m  as calculated from the following:    Height as of this encounter: 1.575 m (5' 2\").    Weight as of this encounter: 70.5 kg (155 lb 6.4 oz).         Medication Reconciliation: Complete      Carina Coyle   "

## 2021-04-18 ENCOUNTER — ALLIED HEALTH/NURSE VISIT (OUTPATIENT)
Dept: FAMILY MEDICINE | Facility: OTHER | Age: 56
End: 2021-04-18
Attending: NURSE PRACTITIONER
Payer: COMMERCIAL

## 2021-04-18 DIAGNOSIS — Z20.822 COVID-19 RULED OUT: Primary | ICD-10-CM

## 2021-04-18 LAB
SARS-COV-2 RNA RESP QL NAA+PROBE: NORMAL
SPECIMEN SOURCE: NORMAL

## 2021-04-18 PROCEDURE — C9803 HOPD COVID-19 SPEC COLLECT: HCPCS

## 2021-04-18 PROCEDURE — U0005 INFEC AGEN DETEC AMPLI PROBE: HCPCS | Mod: ZL | Performed by: NURSE PRACTITIONER

## 2021-04-18 PROCEDURE — U0003 INFECTIOUS AGENT DETECTION BY NUCLEIC ACID (DNA OR RNA); SEVERE ACUTE RESPIRATORY SYNDROME CORONAVIRUS 2 (SARS-COV-2) (CORONAVIRUS DISEASE [COVID-19]), AMPLIFIED PROBE TECHNIQUE, MAKING USE OF HIGH THROUGHPUT TECHNOLOGIES AS DESCRIBED BY CMS-2020-01-R: HCPCS | Mod: ZL | Performed by: NURSE PRACTITIONER

## 2021-04-19 LAB
LABORATORY COMMENT REPORT: NORMAL
SARS-COV-2 RNA RESP QL NAA+PROBE: NEGATIVE
SPECIMEN SOURCE: NORMAL

## 2021-04-22 ENCOUNTER — TRANSFERRED RECORDS (OUTPATIENT)
Dept: HEALTH INFORMATION MANAGEMENT | Facility: OTHER | Age: 56
End: 2021-04-22

## 2021-04-25 ENCOUNTER — HEALTH MAINTENANCE LETTER (OUTPATIENT)
Age: 56
End: 2021-04-25

## 2021-08-14 ENCOUNTER — HEALTH MAINTENANCE LETTER (OUTPATIENT)
Age: 56
End: 2021-08-14

## 2021-10-09 ENCOUNTER — HEALTH MAINTENANCE LETTER (OUTPATIENT)
Age: 56
End: 2021-10-09

## 2021-10-13 ENCOUNTER — ALLIED HEALTH/NURSE VISIT (OUTPATIENT)
Dept: FAMILY MEDICINE | Facility: OTHER | Age: 56
End: 2021-10-13
Attending: PHYSICIAN ASSISTANT
Payer: COMMERCIAL

## 2021-10-13 DIAGNOSIS — R68.83 CHILLS: ICD-10-CM

## 2021-10-13 DIAGNOSIS — R05.9 COUGH: Primary | ICD-10-CM

## 2021-10-13 PROCEDURE — C9803 HOPD COVID-19 SPEC COLLECT: HCPCS

## 2021-10-13 PROCEDURE — U0005 INFEC AGEN DETEC AMPLI PROBE: HCPCS | Mod: ZL

## 2021-10-15 LAB — SARS-COV-2 RNA RESP QL NAA+PROBE: POSITIVE

## 2021-10-17 ENCOUNTER — MYC MEDICAL ADVICE (OUTPATIENT)
Dept: FAMILY MEDICINE | Facility: OTHER | Age: 56
End: 2021-10-17

## 2021-10-18 NOTE — TELEPHONE ENCOUNTER
Kayley is out today. Will send to [facility].  Norma J. Gosselin, LPN .......  10/18/2021  8:51 AM

## 2021-10-22 NOTE — ED NOTES
Patient is lying in bed.  Patient states she can't breath well. She has asthma and a cold currently.  She has a cough that is non-productive, tight sounding.  Patient also states she has been getting worse over the past 3 days.      BP (!) 152/72   Pulse 89   Temp 97.9  F (36.6  C) (Tympanic)   Resp 19   Wt 70.8 kg (156 lb)   SpO2 93%   BMI 28.53 kg/m       Render Risk Assessment In Note?: no Detail Level: Detailed Additional Notes: Defers FBSE today, has appt scheduled. States area has already improved, thinks she might have had a pimple there. Will re-evaluate at skin check, to contact office prior with any changes.

## 2021-11-14 ENCOUNTER — OFFICE VISIT (OUTPATIENT)
Dept: FAMILY MEDICINE | Facility: OTHER | Age: 56
End: 2021-11-14
Attending: PHYSICIAN ASSISTANT
Payer: COMMERCIAL

## 2021-11-14 VITALS
SYSTOLIC BLOOD PRESSURE: 118 MMHG | TEMPERATURE: 98.6 F | RESPIRATION RATE: 18 BRPM | WEIGHT: 158.6 LBS | OXYGEN SATURATION: 93 % | DIASTOLIC BLOOD PRESSURE: 75 MMHG | BODY MASS INDEX: 29.01 KG/M2 | HEART RATE: 79 BPM

## 2021-11-14 DIAGNOSIS — J02.9 SORETHROAT: Primary | ICD-10-CM

## 2021-11-14 LAB — GROUP A STREP BY PCR: NOT DETECTED

## 2021-11-14 PROCEDURE — 87651 STREP A DNA AMP PROBE: CPT | Mod: ZL | Performed by: NURSE PRACTITIONER

## 2021-11-14 PROCEDURE — 99213 OFFICE O/P EST LOW 20 MIN: CPT | Performed by: NURSE PRACTITIONER

## 2021-11-14 ASSESSMENT — PAIN SCALES - GENERAL: PAINLEVEL: SEVERE PAIN (7)

## 2021-11-14 NOTE — NURSING NOTE
"Chief Complaint   Patient presents with     Pharyngitis     Sore throat has been going on for 5 days.     Initial There were no vitals taken for this visit. Estimated body mass index is 28.42 kg/m  as calculated from the following:    Height as of 4/16/21: 1.575 m (5' 2\").    Weight as of 4/16/21: 70.5 kg (155 lb 6.4 oz).     FOOD SECURITY SCREENING QUESTIONS  Hunger Vital Signs:  Within the past 12 months we worried whether our food would run out before we got money to buy more. Never  Within the past 12 months the food we bought just didn't last and we didn't have money to get more. Never      Medication Reconciliation: Complete      Aj Branch LPN   "

## 2021-11-14 NOTE — PROGRESS NOTES
ASSESSMENT/PLAN:  1. Sorethroat    - Group A Streptococcus PCR Throat Swab      Strep result was negative.     Encouraged the patient to have COVID testing completed if strep was negative, declined COVID testing during today's visit.      Discussed with patient that symptoms and exam are consistent with viral illness.  Discussed that symptomatic treatment is appropriate but not with antibiotics.        Symptomatic treatment - Encouraged fluids, salt water gargles, honey, elevation, lozenges, etc     May use over-the-counter Tylenol or ibuprofen PRN    Discussed warning signs/symptoms indicative of need to f/u    Follow up if symptoms persist or worsen or concerns      I explained my diagnostic considerations and recommendations to the patient, who voiced understanding and agreement with the treatment plan. All questions were answered. We discussed potential side effects of any prescribed or recommended therapies, as well as expectations for response to treatments.        HPI:    Puja Ocasio is a 56 year old female  who presents to [facility] today for sore throat. Started 5 days ago. Bilateral ear pain. Rating her pain in throat 8/10. Taking Ibuprofen and mucinex for her symptoms. Denies any cough or rhinitis. Denies fevers. Was having chills and body aches initially, this has improved. Patient has been around family members who have been sick.     Past Medical History:   Diagnosis Date     Abnormal cytological findings in specimens from other organs, systems and tissues     HPV+ PAP -2008, normal paps 2009, 2010 (no endo cells)     Epilepsy without status epilepticus, not intractable (H)     No Comments Provided     Gastritis without bleeding     05/02/06,treated 05/06     Other specified postprocedural states     2008 (negative)     Tinea pedis     8/6/2010     Tobacco use     Attempts at cessation: electric cigarette, patches, chantix.     Past Surgical History:   Procedure Laterality Date      APPENDECTOMY OPEN      at age 16 for acute appendicitis      SECTION      x2     COLONOSCOPY  2013,lian - reported NL     OTHER SURGICAL HISTORY      11/10,48192.0,CT LIGATE FALLOPIAN TUBE,uterine ablation, Dr. Kebede     Social History     Tobacco Use     Smoking status: Current Every Day Smoker     Packs/day: 0.50     Years: 25.00     Pack years: 12.50     Types: Cigarettes     Smokeless tobacco: Never Used   Substance Use Topics     Alcohol use: Yes     Comment: Alcoholic Drinks/day: occasional     Current Outpatient Medications   Medication Sig Dispense Refill     albuterol (PROAIR HFA) 108 (90 Base) MCG/ACT inhaler INHALE 2 PUFFS INTO THE lungs EVERY 4 HOURS AS NEEDED 54 g 11     albuterol (PROAIR HFA/PROVENTIL HFA/VENTOLIN HFA) 108 (90 Base) MCG/ACT inhaler Inhale 2 puffs into the lungs every 6 hours 1 Inhaler 1     amoxicillin (AMOXIL) 500 MG capsule TAKE 2 CAPSULES BY MOUTH now, THEN TAKE 1 CAPSULE BY MOUTH EVERY 8 HOURS       EPINEPHrine (ANY BX GENERIC EQUIV) 0.3 MG/0.3ML injection 2-pack Inject 0.3 mLs (0.3 mg) into the muscle once as needed for anaphylaxis For allergic reaction 2 each 1     mometasone-formoterol (DULERA) 200-5 MCG/ACT inhaler Inhale 2 puffs into the lungs 2 times daily Noemi Care please 13 g 11     predniSONE (DELTASONE) 10 MG tablet 2 twice a day for 4 days, then 1 once a day for 2 days then 1 a day 30 tablet 0     Spacer/Aero-Holding Chambers (AEROCHAMBER MV) MISC For home use. 1 each 0     venlafaxine (EFFEXOR-XR) 150 MG 24 hr capsule TAKE 1 CAPSULE BY MOUTH DAILY WITH dinner 90 capsule 3     Allergies   Allergen Reactions     Tree Nuts  [Nuts] Anaphylaxis     Metronidazole Other (See Comments)     Pt can't remember.      Tetracycline Other (See Comments)     Pt can't remember.          Past medical history, past surgical history, current medications and allergies reviewed and accurate to the best of my knowledge.        ROS:  Refer to HPI    BP (!)  172/100   Pulse 79   Temp 98.6  F (37  C) (Tympanic)   Resp 18   Wt 71.9 kg (158 lb 9.6 oz)   SpO2 93%   BMI 29.01 kg/m      EXAM:  General Appearance: Well appearing female, appropriate appearance for age. No acute distress  Ears: Left TM intact, translucent with bony landmarks appreciated, no erythema, no effusion, no bulging, no purulence.  Right TM intact, translucent with bony landmarks appreciated, no erythema, no effusion, no bulging, no purulence.  Left auditory canal clear.  Right auditory canal clear.  Normal external ears, non tender.  Orophayrnx: moist mucous membranes, posterior pharynx with erythema, tonsils without hypertrophy, no erythema, no exudates or petechiae, no post nasal drip seen, no trismus, voice clear.    Neck: supple without adenopathy  Respiratory: normal chest wall and respirations.  Normal effort.  Clear to auscultation bilaterally, no wheezing, crackles or rhonchi.  No increased work of breathing.  No cough appreciated.  Cardiac: RRR with no murmurs  Psychological: normal affect, alert, oriented, and pleasant.       Labs:  Results for orders placed or performed in visit on 11/14/21   Group A Streptococcus PCR Throat Swab     Status: Normal    Specimen: Throat; Swab   Result Value Ref Range    Group A strep by PCR Not Detected Not Detected    Narrative    The Xpert Xpress Strep A test, performed on the "[employer] Systems, is a rapid, qualitative in vitro diagnostic test for the detection of Streptococcus pyogenes (Group A ß-hemolytic Streptococcus, Strep A) in throat swab specimens from patients with signs and symptoms of pharyngitis. The Xpert Xpress Strep A test can be used as an aid in the diagnosis of Group A Streptococcal pharyngitis. The assay is not intended to monitor treatment for Group A Streptococcus infections. The Xpert Xpress Strep A test utilizes an automated real-time polymerase chain reaction (PCR) to detect Streptococcus pyogenes DNA.

## 2021-11-14 NOTE — PATIENT INSTRUCTIONS
Patient Education     When You Have a Sore Throat  A sore throat can be painful. There are many reasons why you may have a sore throat. Your healthcare provider will work with you to find the cause of your sore throat. He or she will also find the best treatment for you.     What causes a sore throat?  Sore throats can be caused or worsened by:     Cold or flu viruses    Bacteria    Irritants such as tobacco smoke or air pollution    Acid reflux  A healthy throat  The tonsils are on the sides of the throat near the base of the tongue. They collect viruses and bacteria and help fight infection. The throat (pharynx) is the passage for air. Mucus from the nasal cavity also moves down the passage.   An inflamed throat  The tonsils and pharynx can become inflamed due to a cold or flu virus. Postnasal drip (excess mucus draining from the nasal cavity) can irritate the throat. It can also make the throat or tonsils more likely to be infected by bacteria. Severe, untreated tonsillitis in children or adults can cause a pocket of pus (abscess) to form near the tonsil.   Your evaluation  A health evaluation can help find the cause of your sore throat. It can also help your healthcare provider choose the best treatment for you. The evaluation may include a health history, physical exam, and diagnostic tests.   Health history  Your healthcare provider may ask you the following:     How long has the sore throat lasted and how have you been treating it?    Do you have any other symptoms, such as body aches, fever, or cough?    Does your sore throat recur? If so, how often? How many days of school or work have you missed because of a sore throat?    Do you have trouble eating or swallowing?    Have you been told that you snore or have other sleep problems?    Do you have bad breath?    Do you cough up bad-tasting mucus?  Physical exam  During the exam, your healthcare provider checks your ears, nose, and throat for problems. He  or she also checks for swelling in the neck, and may listen to your chest.   Possible tests  Other tests your healthcare provider may perform include:     A throat swab to check for bacteria such as streptococcus (the bacteria that causes strep throat)    A blood test to check for mononucleosis (a viral infection)    A chest X-ray to rule out pneumonia, especially if you have a cough  Treating a sore throat  Treatment depends on many factors. What is the likely cause? Is the problem recent? Does it keep coming back? In many cases, the best thing to do is to treat the symptoms, rest, and let the problem heal itself. Antibiotics may help clear up some bacterial infections. For cases of severe or recurring tonsillitis, the tonsils may need to be removed.   Relieving your symptoms    Don t smoke, and stay away from secondhand smoke.    For children, try throat sprays or frozen ice pops. Adults and older children may try lozenges.    Drink warm liquids to soothe the throat and help thin mucus. Stay away from alcohol, spicy foods, and acidic drinks such as orange juice. These can irritate the throat.    Gargle with warm saltwater ( 1 teaspoon of salt to  8 ounces of warm water).    Use a humidifier to keep air moist and relieve throat dryness.    Try over-the-counter pain relievers such as acetaminophen or ibuprofen. Use as directed, and don t exceed the recommended dose. Don t give aspirin to children under age17.    Are antibiotics needed?  If your sore throat is due to a bacterial infection, antibiotics may speed healing and prevent complications. Although group A streptococcus (strep throat) is the major treatable infection for a sore throat, strep throat causes only 5% to 15% of sore throats in adults who seek medical care. Most sore throats are caused by cold or flu viruses. And antibiotics don t treat viral illness. In fact, using antibiotics when they re not needed may lead to bacteria that are harder to kill.  Your healthcare provider will prescribe antibiotics only if he or she thinks they are likely to help.   If antibiotics are prescribed  Take the medicine exactly as directed. Be sure to finish your prescription even if you re feeling better. Ask your healthcare provider or pharmacist what side effects are common and what to do about them.   Is surgery needed?  In some cases, tonsils need to be removed. This is often done as outpatient (same-day) surgery. Your healthcare provider may advise removing the tonsils in cases of:     Several severe bouts of tonsillitis in a year.  Severe  episodes include those that lead to missed days of school or work, or that need to be treated with antibiotics.    Tonsillitis that causes breathing problems during sleep    Tonsillitis caused by food particles collecting in pouches in the tonsils (cryptic tonsillitis)  When to call your healthcare provider   Call your healthcare provider immediately if any of the following occur:     Problems swallowing    Symptoms worsen, or new symptoms develop.    Swollen tonsils make breathing difficult.    The pain is severe enough to keep you from drinking liquids.    If a skin rash or hives, develops, call your healthcare provider immediately. Any of these could signal an allergic reaction to antibiotics.    Symptoms don t improve within a week.    Symptoms don t improve within  2 to 3  days of starting antibiotics.  Call 911  Call 911 if any of the following occur:     Trouble breathing or problems catching your breath may be a medical emergency.    Skin is blue, purple or gray in color    Trouble talking    Feeling dizzy or faint    Feeling of doom  [employer] last reviewed this educational content on 7/1/2019 2000-2021 [employer]. All rights reserved. This information is not intended as a substitute for professional medical care. Always follow your healthcare professional's instructions.           Patient Education     Self-Care  for Sore Throats     Sore throats happen for many reasons, such as colds, allergies, cigarette smoke, air pollution, and infections caused by viruses or bacteria. In any case, your throat becomes red and sore. Your goal for self-care is to reduce your discomfort while giving your throat a chance to heal.  Moisten and soothe your throat  Tips include the following:    Try a sip of water first thing after waking up.    Keep your throat moist by drinking 6 or more glasses of clear liquids every day.    Run a cool-air humidifier in your room overnight.    Stay away from cigarette smoke.     Check the air quality index,if air pollution gives you a sore throat. On high pollution days, try to limit outdoor time.    Suck on throat lozenges, cough drops, hard candy, ice chips, or frozen fruit-juice bars. Use the sugar-free versions if your diet or medical condition requires them.  Gargle to ease irritation  Gargling every hour or 2 can ease irritation. Try gargling with 1 of these solutions:    1/4 teaspoon of salt in 1/2 cup of warm water    An over-the-counter anesthetic gargle  Use medicine for more relief  Over-the-counter medicine can reduce sore throat symptoms. Ask your pharmacist if you have questions about which medicine to use. To prevent possible medicine interactions, let the pharmacist know what medicines you take. To decrease symptoms:    Ease pain with anesthetic sprays. Aspirin or an aspirin substitute also helps. Remember, never give aspirin to anyone 18 or younger. Don't take aspirin if you are already taking blood thinners.     For sore throats caused by allergies, try antihistamines to block the allergic reaction.  Unless a sore throat is caused by a bacterial infection, antibiotics won t help you.  Prevent future sore throats  Prevention tips include:    Stop smoking or reduce contact with secondhand smoke. Smoke irritates the tender throat lining.    Limit contact with pets and with allergy-causing  substances, such as pollen and mold.    Wash your hands often when you re around someone with a sore throat or cold. This will keep viruses or bacteria from spreading.    Limit outdoor time when air pollution is bad.    Don t strain your vocal cords.  When to call your healthcare provider  Contact your healthcare provider if you have:    Fever of 100.4 F (38.0 C) or higher, or as directed by your healthcare provider    White spots on the throat    Great Trouble swallowing    A skin rash    Recent exposure to someone else with strep bacteria    Severe hoarseness and swollen glands in the neck or jaw  Call 911  Call 911 if any of the following occur:    Trouble breathing or catching your breath    Drooling and problems swallowing    Wheezing    Unable to talk    Feeling dizzy or faint    Feeling of doom  [employer] last reviewed this educational content on 9/1/2019 2000-2021 [employer]. All rights reserved. This information is not intended as a substitute for professional medical care. Always follow your healthcare professional's instructions.

## 2021-12-16 DIAGNOSIS — J44.1 CHRONIC OBSTRUCTIVE PULMONARY DISEASE WITH ACUTE EXACERBATION (H): ICD-10-CM

## 2021-12-20 NOTE — TELEPHONE ENCOUNTER
[facility] sent Rx request for the following:   DULERA 200-5 MCG/ACT inhaler   Sig Inhale 2 puffs into the lungs 2 times daily.    Last Prescription Date:   07/09/2020  Last Fill Qty/Refills:         13g, R-11    Last Office Visit:              04/16/2021 (Doris)   Future Office visit:           None noted   Inhaled Steroids Protocol Failed - 12/16/2021  1:50 PM        Failed - Asthma control assessment score within normal limits in last 6 months     Please review ACT score.          Long-Acting Beta Agonist Inhalers Protocol  Failed - 12/16/2021  1:50 PM        Failed - Asthma control assessment score within normal limits in last 6 months     Please review ACT score.        Unable to complete prescription refill per RN Medication Refill Policy.................... Amirah Gallego RN ....................  12/20/2021   4:13 PM

## 2021-12-21 RX ORDER — MOMETASONE FUROATE AND FORMOTEROL FUMARATE DIHYDRATE 200; 5 UG/1; UG/1
AEROSOL RESPIRATORY (INHALATION)
Qty: 13 G | Refills: 11 | Status: SHIPPED | OUTPATIENT
Start: 2021-12-21 | End: 2022-09-13

## 2022-01-07 ENCOUNTER — TELEPHONE (OUTPATIENT)
Dept: FAMILY MEDICINE | Facility: OTHER | Age: 57
End: 2022-01-07
Payer: COMMERCIAL

## 2022-01-07 NOTE — TELEPHONE ENCOUNTER
Received medical exemption form for AEOA.  Per Kayley MOORE, this will not be completed.    Left detailed message for patient.      Dulce Maria Byers LPN 1/7/2022 1:36 PM

## 2022-04-27 DIAGNOSIS — J44.1 CHRONIC OBSTRUCTIVE PULMONARY DISEASE WITH ACUTE EXACERBATION (H): ICD-10-CM

## 2022-04-28 RX ORDER — ALBUTEROL SULFATE 90 UG/1
AEROSOL, METERED RESPIRATORY (INHALATION)
Qty: 54 G | Refills: 0 | Status: SHIPPED | OUTPATIENT
Start: 2022-04-28 | End: 2022-07-08

## 2022-04-28 NOTE — TELEPHONE ENCOUNTER
[facility] sent Rx request for the following:      Requested Prescriptions   Pending Prescriptions Disp Refills     albuterol (PROAIR HFA/PROVENTIL HFA/VENTOLIN HFA) 108 (90 Base) MCG/ACT inhaler [Pharmacy Med Name: albuterol sulfate HFA 90 mcg/actuation aerosol inhaler] 25.5 g 11     Sig: INHALE 2 PUFFS INTO THE lungs EVERY 4 HOURS AS NEEDED       Asthma Maintenance Inhalers - Anticholinergics Failed - 4/28/2022 11:15 AM    Short-Acting Beta Agonist Inhalers Protocol  Failed - 4/28/2022 11:15 AM        Failed - Asthma control assessment score within normal limits in last 6 months     Please review ACT score.   Pt has COPD     Last Prescription Date:   4/16/21  Last Fill Qty/Refills:         54 g, R-11    Last Office Visit:              4/16/21   Future Office visit:           None    Pt due for annual exam. Routing to provider for refill consideration. Routing to  OUTREACH APPT REQUESTS pool, to assist Pt in scheduling appointment.     In clinical absence of patient's primary, Kayley George, patient is requesting that this message be sent to the covering provider for consideration please.    Shoshana Gonzalez RN .............. 4/28/2022  11:18 AM

## 2022-05-03 NOTE — TELEPHONE ENCOUNTER
Tried twice, when calling rings a few times then says call cannot be completed at this time.     Mary Romeo on 5/3/2022 at 12:28 PM

## 2022-05-10 ENCOUNTER — HOSPITAL ENCOUNTER (OUTPATIENT)
Dept: GENERAL RADIOLOGY | Facility: OTHER | Age: 57
Discharge: HOME | End: 2022-05-10
Attending: NURSE PRACTITIONER
Payer: COMMERCIAL

## 2022-05-10 ENCOUNTER — OFFICE VISIT (OUTPATIENT)
Dept: FAMILY MEDICINE | Facility: OTHER | Age: 57
End: 2022-05-10
Attending: NURSE PRACTITIONER
Payer: COMMERCIAL

## 2022-05-10 VITALS
WEIGHT: 147 LBS | SYSTOLIC BLOOD PRESSURE: 145 MMHG | DIASTOLIC BLOOD PRESSURE: 87 MMHG | RESPIRATION RATE: 18 BRPM | OXYGEN SATURATION: 96 % | BODY MASS INDEX: 26.89 KG/M2 | TEMPERATURE: 98.4 F | HEART RATE: 69 BPM

## 2022-05-10 DIAGNOSIS — R07.89 RIGHT-SIDED CHEST WALL PAIN: Primary | ICD-10-CM
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
## 2022-05-10 PROCEDURE — 99213 OFFICE O/P EST LOW 20 MIN: CPT | Performed by: NURSE PRACTITIONER

## 2022-05-10 PROCEDURE — 71101 X-RAY EXAM UNILAT RIBS/CHEST: CPT | Mod: RT

## 2022-05-10 ASSESSMENT — PAIN SCALES - GENERAL: PAINLEVEL: EXTREME PAIN (9)

## 2022-05-10 NOTE — NURSING NOTE
Patient presents to clinic experiencing right arm, upper chest pain after fall 3 days ago.    Medication Rec Complete  Shoshana Santizo LPN............5/10/2022 12:43 PM

## 2022-05-10 NOTE — PROGRESS NOTES
ASSESSMENT/PLAN:     I have reviewed the nursing notes.  I have reviewed the findings, diagnosis, plan and need for follow up with the patient.        1. Fall from height of greater than 3 feet    - XR Ribs & Chest Rt 3vw    2. Right-sided chest wall pain    - XR Ribs & Chest Rt 3vw    Xray of right ribs and chest completed and personally reviewed, no fracture appreciated, radiologist over read:  Chest: The lungs are clear.   There are no right rib fractures or destructive lesions noted.    Discussed that pain is from muscle strain and likely costochondritis.  Pain worsened with turning steering wheel driving.      Ibuprofen 600 mg up to TID PRN or Naproxen 1 tab BID PRN    Ice for 10 to 15 minute QID for the next 5 days then may start alternating with heat    Topical pain reliever or patches PRN    Discussed warning signs/symptoms indicative of need to f/u  Follow up if symptoms persist or worsen or concerns      I explained my diagnostic considerations and recommendations to the patient, who voiced understanding and agreement with the treatment plan. All questions were answered. We discussed potential side effects of any prescribed or recommended therapies, as well as expectations for response to treatments.    Falguni Benitez NP  [facility]      SUBJECTIVE:   Puja Ocasio is a 56 year old female who presents to clinic today for the following health issues:  Fall, right arm and chest pain    HPI  Fell 3 days ago.  She slipped down the steps of her grandchildren's tree house.  She fell head first.  No LOC.  No headaches.  Right neck muscle pain.  No cervical spine pain.  She is still experiencing right arm and right sided chest wall pain.  Pain worsening today.  She did work yesterday, she drives schoolbus.   Shooting pain in right arm today.  Sharp pain in arm.  No numbness or tingling.  No weakness in right arm.  Painful to deep breath.  No shortness of breath.    Taking ibuprofen  800 mg with mild relief.    Using Huey Fuentes topical   No icing       Past Medical History:   Diagnosis Date     Abnormal cytological findings in specimens from other organs, systems and tissues     HPV+ PAP -, normal paps ,  (no endo cells)     Epilepsy without status epilepticus, not intractable (H)     No Comments Provided     Gastritis without bleeding     06,treated      Other specified postprocedural states      (negative)     Tinea pedis     2010     Tobacco use     Attempts at cessation: electric cigarette, patches, chantix.     Past Surgical History:   Procedure Laterality Date     APPENDECTOMY OPEN      at age 16 for acute appendicitis      SECTION      x2     COLONOSCOPY  2013,lian - reported NL     OTHER SURGICAL HISTORY      11/10,95458.0,SD LIGATE FALLOPIAN TUBE,uterine ablation, Dr. Kebede     Social History     Tobacco Use     Smoking status: Current Every Day Smoker     Packs/day: 0.50     Years: 25.00     Pack years: 12.50     Types: Cigarettes     Smokeless tobacco: Never Used   Substance Use Topics     Alcohol use: Yes     Comment: Alcoholic Drinks/day: occasional     Current Outpatient Medications   Medication Sig Dispense Refill     albuterol (PROAIR HFA/PROVENTIL HFA/VENTOLIN HFA) 108 (90 Base) MCG/ACT inhaler INHALE 2 PUFFS INTO THE lungs EVERY 4 HOURS AS NEEDED 54 g 0     albuterol (PROAIR HFA/PROVENTIL HFA/VENTOLIN HFA) 108 (90 Base) MCG/ACT inhaler Inhale 2 puffs into the lungs every 6 hours 1 Inhaler 1     DULERA 200-5 MCG/ACT inhaler Inhale 2 puffs into the lungs 2 times daily. 13 g 11     EPINEPHrine (ANY BX GENERIC EQUIV) 0.3 MG/0.3ML injection 2-pack Inject 0.3 mLs (0.3 mg) into the muscle once as needed for anaphylaxis For allergic reaction 2 each 1     Spacer/Aero-Holding Chambers (AEROCHAMBER MV) MISC For home use. 1 each 0     venlafaxine (EFFEXOR-XR) 150 MG 24 hr capsule TAKE 1 CAPSULE BY MOUTH DAILY WITH dinner 90 capsule 3      amoxicillin (AMOXIL) 500 MG capsule TAKE 2 CAPSULES BY MOUTH now, THEN TAKE 1 CAPSULE BY MOUTH EVERY 8 HOURS (Patient not taking: Reported on 5/10/2022)       predniSONE (DELTASONE) 10 MG tablet 2 twice a day for 4 days, then 1 once a day for 2 days then 1 a day (Patient not taking: Reported on 5/10/2022) 30 tablet 0     Allergies   Allergen Reactions     Tree Nuts  [Nuts] Anaphylaxis     Metronidazole Other (See Comments)     Pt can't remember.      Tetracycline Other (See Comments)     Pt can't remember.          Past medical history, past surgical history, current medications and allergies reviewed and accurate to the best of my knowledge.        OBJECTIVE:     BP (!) 145/87 (BP Location: Left arm, Patient Position: Sitting, Cuff Size: Adult Regular)   Pulse 69   Temp 98.4  F (36.9  C) (Tympanic)   Resp 18   Wt 66.7 kg (147 lb)   LMP  (LMP Unknown)   SpO2 96%   Breastfeeding No   BMI 26.89 kg/m    Body mass index is 26.89 kg/m .     Physical Exam  General Appearance: Well appearing adult female, appropriate appearance for age. No acute distress  Respiratory: normal chest wall and respirations.  Normal effort.  Clear to auscultation bilaterally, no wheezing, crackles or rhonchi.  No increased work of breathing.  No cough appreciated.  Musculoskeletal:  Right anterior chest wall and axillary area with tenderness to palpation.  No clavicular tenderness to palpation.  Equal movement of bilateral upper extremities.  Normal movement of right shoulder without difficulty or stated discomfort.  Equal movement of bilateral lower extremities.  Normal gait.   Dermatological: no rashes or bruising noted of exposed skin  Psychological: normal affect, alert, oriented, and pleasant.     Imaging:  Results for orders placed or performed in visit on 05/10/22   XR Ribs & Chest Rt 3vw     Status: None    Narrative    PROCEDURE: XR RIBS & CHEST RT 3VW 5/10/2022 1:12 PM    HISTORY: Fall from height of greater than 3 feet;  Right-sided chest  wall pain    COMPARISONS: 2017    TECHNIQUE: Chest one view, right RIBS 3 views    FINDINGS: Chest: The lungs are clear. The heart and the pulmonary  vessels are within normal limits. There is no pneumothorax or pleural  effusion.    Right ribs: There are no right rib fractures or destructive lesions  noted.         Impression    IMPRESSION: No right rib fractures.    GUSTAVO DAVIES MD         SYSTEM ID:  [id number]

## 2022-07-06 DIAGNOSIS — J44.1 CHRONIC OBSTRUCTIVE PULMONARY DISEASE WITH ACUTE EXACERBATION (H): ICD-10-CM

## 2022-07-06 DIAGNOSIS — F41.0 PANIC ATTACK: ICD-10-CM

## 2022-07-07 NOTE — TELEPHONE ENCOUNTER
[facility] sent Rx request for the following:      ProAir HFA 90 mcg/actuation aerosol inhaler      Last Prescription Date:   4/28/2022  Last Fill Qty/Refills:         54g, R-0    Last Office Visit:              4/16/2021   Future Office visit:           none    Clemente García RN, BSN  ....................  7/7/2022   4:11 PM

## 2022-07-07 NOTE — TELEPHONE ENCOUNTER
[facility] sent Rx request for the following:      venlafaxine  mg capsule,extended release 24 hr      Last Prescription Date:   4/16/2021  Last Fill Qty/Refills:         90, R-3    Last Office Visit:              4/16/2021   Future Office visit:           none    Clemente García RN, BSN  ....................  7/7/2022   4:12 PM

## 2022-07-08 RX ORDER — VENLAFAXINE HYDROCHLORIDE 150 MG/1
CAPSULE, EXTENDED RELEASE ORAL
Qty: 90 CAPSULE | Refills: 0 | Status: SHIPPED | OUTPATIENT
Start: 2022-07-08 | End: 2022-09-13

## 2022-07-08 RX ORDER — ALBUTEROL SULFATE 90 UG/1
AEROSOL, METERED RESPIRATORY (INHALATION)
Qty: 25.5 G | Refills: 0 | Status: SHIPPED | OUTPATIENT
Start: 2022-07-08 | End: 2022-09-08

## 2022-07-08 NOTE — TELEPHONE ENCOUNTER
Please reach out and assist patient in scheduling physical. BLACK Car CNP on 7/8/2022 at 12:04 PM

## 2022-07-08 NOTE — TELEPHONE ENCOUNTER
Talked to patient. She will have to call back to schedule a physical at another time.    Adia Lord on 7/8/2022 at 1:50 PM

## 2022-07-08 NOTE — TELEPHONE ENCOUNTER
Patient will call back to schedule when she gets her schedule.     Mary Romeo on 7/8/2022 at 2:26 PM

## 2022-09-08 ENCOUNTER — HOSPITAL ENCOUNTER (OUTPATIENT)
Dept: GENERAL RADIOLOGY | Facility: OTHER | Age: 57
Discharge: HOME | End: 2022-09-08
Attending: NURSE PRACTITIONER
Payer: COMMERCIAL

## 2022-09-08 ENCOUNTER — OFFICE VISIT (OUTPATIENT)
Dept: FAMILY MEDICINE | Facility: OTHER | Age: 57
End: 2022-09-08
Attending: NURSE PRACTITIONER
Payer: COMMERCIAL

## 2022-09-08 VITALS
WEIGHT: 149.8 LBS | TEMPERATURE: 98 F | HEIGHT: 62 IN | DIASTOLIC BLOOD PRESSURE: 80 MMHG | OXYGEN SATURATION: 89 % | BODY MASS INDEX: 27.57 KG/M2 | SYSTOLIC BLOOD PRESSURE: 136 MMHG | RESPIRATION RATE: 24 BRPM | HEART RATE: 96 BPM

## 2022-09-08 DIAGNOSIS — J44.1 COPD EXACERBATION (H): Primary | ICD-10-CM

## 2022-09-08 DIAGNOSIS — J18.9 PNEUMONIA OF RIGHT LOWER LOBE DUE TO INFECTIOUS ORGANISM: ICD-10-CM

## 2022-09-08 DIAGNOSIS — J42 CHRONIC BRONCHITIS, UNSPECIFIED CHRONIC BRONCHITIS TYPE (H): ICD-10-CM

## 2022-09-08 DIAGNOSIS — R05.9 COUGH: ICD-10-CM

## 2022-09-08 DIAGNOSIS — J44.1 CHRONIC OBSTRUCTIVE PULMONARY DISEASE WITH ACUTE EXACERBATION (H): ICD-10-CM

## 2022-09-08 DIAGNOSIS — Z91.018 TREE NUT ALLERGY: ICD-10-CM

## 2022-09-08 DIAGNOSIS — F17.200 NICOTINE DEPENDENCE, UNCOMPLICATED, UNSPECIFIED NICOTINE PRODUCT TYPE: ICD-10-CM

## 2022-09-08 PROCEDURE — 99214 OFFICE O/P EST MOD 30 MIN: CPT | Performed by: NURSE PRACTITIONER

## 2022-09-08 PROCEDURE — 71046 X-RAY EXAM CHEST 2 VIEWS: CPT

## 2022-09-08 RX ORDER — EPINEPHRINE 0.3 MG/.3ML
0.3 INJECTION SUBCUTANEOUS
Qty: 2 EACH | Refills: 1 | Status: SHIPPED | OUTPATIENT
Start: 2022-09-08

## 2022-09-08 RX ORDER — ALBUTEROL SULFATE 90 UG/1
AEROSOL, METERED RESPIRATORY (INHALATION)
Qty: 25.5 G | Refills: 0 | Status: SHIPPED | OUTPATIENT
Start: 2022-09-08 | End: 2023-09-20

## 2022-09-08 RX ORDER — PREDNISONE 20 MG/1
40 TABLET ORAL DAILY
Qty: 10 TABLET | Refills: 0 | Status: SHIPPED | OUTPATIENT
Start: 2022-09-08 | End: 2022-09-13

## 2022-09-08 RX ORDER — AZITHROMYCIN 250 MG/1
TABLET, FILM COATED ORAL
Qty: 6 TABLET | Refills: 0 | Status: SHIPPED | OUTPATIENT
Start: 2022-09-08 | End: 2022-09-13

## 2022-09-08 ASSESSMENT — PAIN SCALES - GENERAL: PAINLEVEL: NO PAIN (0)

## 2022-09-08 NOTE — NURSING NOTE
Patient presents today for cough, shortness of breath, wheezing, fatigue and chills over the last week. Patient has had 2 negative at home Covid-19 tests.    Medication Reconciliation Complete    Adela Null LPN  9/8/2022 2:40 PM

## 2022-09-08 NOTE — LETTER
My COPD Action Plan     Name: Puja Ocasio    YOB: 1965   Date: 9/9/2022    My doctor: BLACK Car CNP   My clinic: [facility]    [address]  [place] [id number] [phone]  My Controller Medicine: Formoterol/mometasone (Dulera)   Dose: 200-5 2 puffs twice daily     My Rescue Medicine: Albuterol (Proair/Ventolin/Proventil) inhaler   Dose: 2 puffs every 4 hours as needed     My Flare Up Medicine: Prednisone   Dose: 40 mg twice daily for 5 days     My COPD Severity:       Use of Oxygen: Oxygen Not Prescribed      Make sure you've had your pneumonia   vaccines.          GREEN ZONE       Doing well today      Usual level of activity and exercise    Usual amount of cough and mucus    No shortness of breath    Usual level of health (thinking clearly, sleeping well, feel like eating) Actions:      Take daily medicines    Use oxygen as prescribed    Follow regular exercise and diet plan    Avoid cigarette smoke and other irritants that harm the lungs           YELLOW ZONE          Having a bad day or flare up      Short of breath more than usual    A lot more sputum (mucus) than usual    Sputum looks yellow, green, tan, brown or bloody    More coughing or wheezing    Fever or chills    Less energy; trouble completing activities    Trouble thinking or focusing    Using quick relief inhaler or nebulizer more often    Poor sleep; symptoms wake me up    Do not feel like eating Actions:      Get plenty of rest    Take daily medicines    Use quick relief inhaler every 4 hours    If you use oxygen, call you doctor to see if you should adjust your oxygen    Do breathing exercises or other things to help you relax    Let a loved one, friend or neighbor know you are feeling worse    Call your care team if you have 2 or more symptoms.  Start taking steroids or antibiotics if directed by your care team           RED ZONE       Need medical care now      Severe  shortness of breath (feel you can't breathe)    Fever, chills    Not enough breath to do any activity    Trouble coughing up mucus, walking or talking    Blood in mucus    Frequent coughing   Rescue medicines are not working    Not able to sleep because of breathing    Feel confused or drowsy    Chest pain    Actions:      Call your health care team.  If you cannot reach your care team, call 911 or go to the emergency room.        Annual Reminders:  Meet with Care Team, Flu Shot every Fall  Pharmacy: [facility] - [place], [place] - [address]

## 2022-09-08 NOTE — PROGRESS NOTES
Assessment & Plan   Problem List Items Addressed This Visit    None     Visit Diagnoses     COPD exacerbation (H)    -  Primary    Relevant Medications    azithromycin (ZITHROMAX) 250 MG tablet    predniSONE (DELTASONE) 20 MG tablet    albuterol (PROAIR HFA) 108 (90 Base) MCG/ACT inhaler    Cough        Relevant Medications    albuterol (PROAIR HFA) 108 (90 Base) MCG/ACT inhaler    Other Relevant Orders    XR Chest 2 Views (Completed)    Chronic bronchitis, unspecified chronic bronchitis type (H)        Relevant Orders    XR Chest 2 Views (Completed)    Nicotine dependence, uncomplicated, unspecified nicotine product type        Relevant Orders    [place] Quit Partner Referral    Pneumonia of right lower lobe due to infectious organism        Relevant Medications    azithromycin (ZITHROMAX) 250 MG tablet    albuterol (PROAIR HFA) 108 (90 Base) MCG/ACT inhaler    Chronic obstructive pulmonary disease with acute exacerbation (H)        Relevant Medications    predniSONE (DELTASONE) 20 MG tablet    albuterol (PROAIR HFA) 108 (90 Base) MCG/ACT inhaler    Tree nut allergy        Relevant Medications    albuterol (PROAIR HFA) 108 (90 Base) MCG/ACT inhaler    EPINEPHrine (ANY BX GENERIC EQUIV) 0.3 MG/0.3ML injection 2-pack      X-ray reviewed and infiltrate noted to right lower lobe.  We will treat this with azithromycin, prednisone for pneumonia and COPD exacerbation.  Refilled albuterol inhaler.  I did discuss that the Dulera inhaler is 2 puffs twice daily.  She did not realize this and will restart this twice daily.  Information on [place] quit plan was provided.  We will discuss smoking cessation further at her annual physical next week.  She does have a tree nut allergy, EpiPen was refilled today.    Close follow-up if her symptoms are not worsening or she is not showing significant improvement over the next few days.    Review of the result(s) of each unique test - xray  Ordering of each unique test  Prescription drug  "management  23 minutes spent on the date of the encounter doing chart review, history and exam, documentation and further activities per the note       Nicotine/Tobacco Cessation:  She reports that she has been smoking cigarettes. She has a 12.50 pack-year smoking history. She has never used smokeless tobacco.  Nicotine/Tobacco Cessation Plan:   Phone counseling: Place order for Quit Partner Referral      No follow-ups on file.    BLACK Car CNP  [facility] AND [facility]   Puja is a 57 year old, presenting for the following health issues:  No chief complaint on file.      History of Present Illness       Reason for visit:  Feeling very c sick  Symptom onset:  3-7 days ago  Symptom intensity:  Severe  Symptom progression:  Worsening  Had these symptoms before:  Yes  Has tried/received treatment for these symptoms:  Yes  Previous treatment was successful:  Yes  What makes it better:  Advil    She eats 2-3 servings of fruits and vegetables daily.She consumes 2 sweetened beverage(s) daily.She exercises with enough effort to increase her heart rate 20 to 29 minutes per day.    She is taking medications regularly.     She comes in today for evaluation of respiratory symptoms that have been present for the past week and have progressively worsened.  She has had intermittent fevers and chills, coughing that is productive, shortness of breath.  She has not had any chest pains.  She has been using Mucinex for symptomatic management.  She did do a COVID test on Monday and Wednesday which were both negative.  She has had increased use of her albuterol inhaler.  Has not been using Dulera consistently, she thought this was possibly 4 times a day and reports that would be difficult with her job.  She does continue to smoke but is interested in quitting.    Review of Systems   See above      Objective    /80   Pulse 96   Temp 98  F (36.7  C)   Resp 24   Ht 1.575 m (5' 2\")   Wt 67.9 kg " (149 lb 12.8 oz)   SpO2 (!) 89%   BMI 27.40 kg/m    Body mass index is 27.4 kg/m .  Physical Exam   GENERAL: healthy, alert and no distress  EYES: Eyes grossly normal to inspection, PERRL and conjunctivae and sclerae normal  HENT: ear canals and TM's normal, nose and mouth without ulcers or lesions  NECK: no adenopathy, no asymmetry, masses, or scars and thyroid normal to palpation  RESP: Diminished breath sounds with rales and rhonchi throughout, O2 sats 89% on room air.  She does not appear to be having any respiratory distress, able to walk to x-ray and back without significant shortness of breath.  CV: regular rate and rhythm, normal S1 S2, no S3 or [id number]: mentation appears normal, affect normal/bright    Xray - Reviewed and interpreted by me.  Right lower lobe pneumonia

## 2022-09-08 NOTE — PATIENT INSTRUCTIONS
How to Quit Smoking  Smoking is a hard habit to break. About 50% of all people who have ever smoked have been able to quit. Most people who still smoke want to quit. Here are some of the best ways to stop smoking.     Keep in mind the health benefits of quitting  The health benefits of quitting start right away. They keep improving the longer you go without smoking. Knowing this can help inspire you to stay on track. These benefits occur at any age. If you are 17 or 70, quitting is a good choice. Some of the health benefits after your last cigarette include:     After 20 minutes: Your blood pressure and pulse return to normal.    After 8 hours: Your oxygen levels return to normal.    After 2 days: Your ability to smell and taste start to improve as damaged nerves regrow.    After 2 to 3 weeks: Your circulation and lung function improve.    After 1 to 9 months: Your coughing, congestion, and shortness of breath decrease. Your tiredness decreases.    After 1 year: Your risk of heart attack decreases by 50%.    After 5 years: Your risk of lung cancer decreases by 50%. Your risk of stroke becomes the same as a nonsmoker s.  Go cold turkey  Most former smokers quit cold turkey. This means stopping all at once. Trying to cut back slowly often doesn't work as well. This may be because it continues the habit of smoking. Also you may inhale more smoke while smoking fewer cigarettes. This leads to the same amount of nicotine in your body.   Get support  Support programs can be a big help, especially for heavy smokers. These groups offer lectures, ways to change behavior, and peer support. Here are some ways to find a support program:     Free national quitline 800-QUIT-NOW (237-245-4717)    [facility] quit-smoking programs    American Lung Association 937-391-7883    American Cancer Society 005-852-9030  Support at home is important too. Family and friends can offer praise and reassurance. If the smoker in your life finds  it hard to quit, encourage them to keep trying.   Try over-the-counter medicine  Nicotine replacement therapy may make it easier to quit. Some aids are available without a prescription. These include a nicotine patch, gum, and lozenges. But it's best to use these under the care of your healthcare provider. The skin patch gives a steady supply of nicotine. Nicotine gum and lozenges give short-time doses of low levels of nicotine. Both methods reduce the craving for cigarettes. If you have nausea, vomiting, dizziness, weakness, or a fast heartbeat, stop using these products. See your provider.   Ask about prescription medicine  After reviewing your smoking patterns and past attempts to quit, your healthcare provider may offer a prescription medicine such as bupropion, varenicline, a nicotine inhaler, or nasal spray. Each has advantages and side effects. Your provider can review these with you.   Keep trying  Most smokers make many attempts at quitting before they succeed. It s important not to give up.   To learn more  For more on how to quit smoking, try these resources:     www.cdc.gov/tobacco/quit_smoking/ 800-QUIT-NOW (725-079-3695)    www.smokefree.gov 877-44U-QUIT (058-272-7647)    www.lung.org/stop-smoking/ 800-LUNGUSA (719-159-8642)  [employer]atu last reviewed this educational content on 12/1/2019 2000-2021 [employer]. All rights reserved. This information is not intended as a substitute for professional medical care. Always follow your healthcare professional's instructions.

## 2022-09-13 ENCOUNTER — OFFICE VISIT (OUTPATIENT)
Dept: FAMILY MEDICINE | Facility: OTHER | Age: 57
End: 2022-09-13
Attending: NURSE PRACTITIONER
Payer: COMMERCIAL

## 2022-09-13 VITALS
RESPIRATION RATE: 22 BRPM | SYSTOLIC BLOOD PRESSURE: 136 MMHG | WEIGHT: 150.8 LBS | TEMPERATURE: 98.8 F | OXYGEN SATURATION: 91 % | DIASTOLIC BLOOD PRESSURE: 84 MMHG | HEART RATE: 84 BPM | HEIGHT: 62 IN | BODY MASS INDEX: 27.75 KG/M2

## 2022-09-13 DIAGNOSIS — Z12.31 ENCOUNTER FOR SCREENING MAMMOGRAM FOR BREAST CANCER: ICD-10-CM

## 2022-09-13 DIAGNOSIS — J44.1 COPD EXACERBATION (H): ICD-10-CM

## 2022-09-13 DIAGNOSIS — F41.9 ANXIETY: ICD-10-CM

## 2022-09-13 DIAGNOSIS — Z00.00 ROUTINE GENERAL MEDICAL EXAMINATION AT A HEALTH CARE FACILITY: Primary | ICD-10-CM

## 2022-09-13 DIAGNOSIS — F17.200 NICOTINE DEPENDENCE, UNCOMPLICATED, UNSPECIFIED NICOTINE PRODUCT TYPE: ICD-10-CM

## 2022-09-13 DIAGNOSIS — F17.200 TOBACCO USE DISORDER: ICD-10-CM

## 2022-09-13 DIAGNOSIS — J44.1 CHRONIC OBSTRUCTIVE PULMONARY DISEASE WITH ACUTE EXACERBATION (H): ICD-10-CM

## 2022-09-13 DIAGNOSIS — R25.1 TREMOR: ICD-10-CM

## 2022-09-13 DIAGNOSIS — Z13.220 LIPID SCREENING: ICD-10-CM

## 2022-09-13 DIAGNOSIS — Z87.891 PERSONAL HISTORY OF TOBACCO USE: ICD-10-CM

## 2022-09-13 DIAGNOSIS — F41.0 PANIC ATTACK: ICD-10-CM

## 2022-09-13 DIAGNOSIS — Z13.29 SCREENING FOR THYROID DISORDER: ICD-10-CM

## 2022-09-13 PROCEDURE — 90471 IMMUNIZATION ADMIN: CPT | Performed by: NURSE PRACTITIONER

## 2022-09-13 PROCEDURE — 90677 PCV20 VACCINE IM: CPT | Performed by: NURSE PRACTITIONER

## 2022-09-13 PROCEDURE — 99212 OFFICE O/P EST SF 10 MIN: CPT | Mod: 25 | Performed by: NURSE PRACTITIONER

## 2022-09-13 PROCEDURE — 99396 PREV VISIT EST AGE 40-64: CPT | Mod: 25 | Performed by: NURSE PRACTITIONER

## 2022-09-13 PROCEDURE — G0296 VISIT TO DETERM LDCT ELIG: HCPCS | Performed by: NURSE PRACTITIONER

## 2022-09-13 RX ORDER — PREDNISONE 20 MG/1
20 TABLET ORAL DAILY
Qty: 5 TABLET | Refills: 0 | Status: SHIPPED | OUTPATIENT
Start: 2022-09-13 | End: 2022-09-18

## 2022-09-13 RX ORDER — MOMETASONE FUROATE AND FORMOTEROL FUMARATE DIHYDRATE 200; 5 UG/1; UG/1
2 AEROSOL RESPIRATORY (INHALATION) 2 TIMES DAILY
Qty: 13 G | Refills: 11 | Status: SHIPPED | OUTPATIENT
Start: 2022-09-13 | End: 2022-09-23

## 2022-09-13 RX ORDER — BUSPIRONE HYDROCHLORIDE 5 MG/1
TABLET ORAL
Qty: 90 TABLET | Refills: 1 | Status: SHIPPED | OUTPATIENT
Start: 2022-09-13 | End: 2023-04-14

## 2022-09-13 RX ORDER — VENLAFAXINE HYDROCHLORIDE 150 MG/1
150 CAPSULE, EXTENDED RELEASE ORAL DAILY
Qty: 90 CAPSULE | Refills: 3 | Status: SHIPPED | OUTPATIENT
Start: 2022-09-13 | End: 2023-09-22

## 2022-09-13 ASSESSMENT — ENCOUNTER SYMPTOMS
HEMATURIA: 0
MYALGIAS: 0
WEAKNESS: 0
PARESTHESIAS: 0
COUGH: 0
FREQUENCY: 0
NERVOUS/ANXIOUS: 0
CHILLS: 0
SORE THROAT: 0
ABDOMINAL PAIN: 0
DYSURIA: 0
HEARTBURN: 0
DIARRHEA: 0
BREAST MASS: 0
PALPITATIONS: 0
HEADACHES: 0
CONSTIPATION: 0
EYE PAIN: 0
JOINT SWELLING: 0
NAUSEA: 0
ARTHRALGIAS: 0
DIZZINESS: 0
SHORTNESS OF BREATH: 0
FEVER: 0
HEMATOCHEZIA: 0

## 2022-09-13 ASSESSMENT — PAIN SCALES - GENERAL: PAINLEVEL: NO PAIN (0)

## 2022-09-13 NOTE — LETTER
September 13, 2022      Puja Ocasio  [address] [place] [address]        To Whom It May Concern:    Puja Ocasio was seen in our clinic. She may return to work if she is able to have less physical exertion due to increased shortness of breath. Once her breathing has improved, she may return to normal work activities as she tolerates. .      Sincerely,        BLACK Car CNP

## 2022-09-13 NOTE — PATIENT INSTRUCTIONS
Lung Cancer Screening   Frequently Asked Questions  If you are at high-risk for lung cancer, getting screened with low-dose computed tomography (LDCT) every year can help save your life. This handout offers answers to some of the most common questions about lung cancer screening. If you have other questions, please call [phone] ([phone]).     What is it?  Lung cancer screening uses special X-ray technology to create an image of your lung tissue. The exam is quick and easy and takes less than 10 seconds. We don t give you any medicine or use any needles. You can eat before and after the exam. You don t need to change your clothes as long as the clothing on your chest doesn t contain metal. But, you do need to be able to hold your breath for at least 6 seconds during the exam.    What is the goal of lung cancer screening?  The goal of lung cancer screening is to save lives. Many times, lung cancer is not found until a person starts having physical symptoms. Lung cancer screening can help detect lung cancer in the earliest stages when it may be easier to treat.    Who should be screened for lung cancer?  We suggest lung cancer screening for anyone who is at high-risk for lung cancer. You are in the high-risk group if you:      are between the ages of 55 and 79, and    have smoked at least 1 pack of cigarettes a day for 20 or more years, and    still smoke or have quit within the past 15 years.    However, if you have a new cough or shortness of breath, you should talk to your doctor before being screened.    Why does it matter if I have symptoms?  Certain symptoms can be a sign that you have a condition in your lungs that should be checked and treated by your doctor. These symptoms include fever, chest pain, a new or changing cough, shortness of breath that you have never felt before, coughing up blood or unexplained weight loss. Having any of these symptoms can greatly affect the results of lung  cancer screening.       Should all smokers get an LDCT lung cancer screening exam?  It depends. Lung cancer screening is for a very specific group of men and women who have a history of heavy smoking over a long period of time (see  Who should be screened for lung cancer  above).  I am in the high-risk group, but have been diagnosed with cancer in the past. Is LDCT lung cancer screening right for me?  In some cases, you should not have LDCT lung screening, such as when your doctor is already following your cancer with CT scan studies. Your doctor will help you decide if LDCT lung screening is right for you.  Do I need to have a screening exam every year?  Yes. If you are in the high-risk group described earlier, you should get an LDCT lung cancer screening exam every year until you are 79, or are no longer willing or able to undergo screening and possible procedures to diagnose and treat lung cancer.  How effective is LDCT at preventing death from lung cancer?  Studies have shown that LDCT lung cancer screening can lower the risk of death from lung cancer by 20 percent in people who are at high-risk.  What are the risks?  There are some risks and limitations of LDCT lung cancer screening. We want to make sure you understand the risks and benefits, so please let us know if you have any questions. Your doctor may want to talk with you more about these risks.    Radiation exposure: As with any exam that uses radiation, there is a very small increased risk of cancer. The amount of radiation in LDCT is small--about the same amount a person would get from a mammogram. Your doctor orders the exam when he or she feels the potential benefits outweigh the risks.    False negatives: No test is perfect, including LDCT. It is possible that you may have a medical condition, including lung cancer, that is not found during your exam. This is called a false negative result.    False positives and more testing: LDCT very often finds  something in the lung that could be cancer, but in fact is not. This is called a false positive result. False positive tests often cause anxiety. To make sure these findings are not cancer, you may need to have more tests. These tests will be done only if you give us permission. Sometimes patients need a treatment that can have side effects, such as a biopsy. For more information on false positives, see  What can I expect from the results?     Findings not related to lung cancer: Your LDCT exam also takes pictures of areas of your body next to your lungs. In a very small number of cases, the CT scan will show an abnormal finding in one of these areas, such as your kidneys, adrenal glands, liver or thyroid. This finding may not be serious, but you may need more tests. Your doctor can help you decide what other tests you may need, if any.  What can I expect from the results?  About 1 out of 4 LDCT exams will find something that may need more tests. Most of the time, these findings are lung nodules. Lung nodules are very small collections of tissue in the lung. These nodules are very common, and the vast majority--more than 97 percent--are not cancer (benign). Most are normal lymph nodes or small areas of scarring from past infections.  But, if a small lung nodule is found to be cancer, the cancer can be cured more than 90 percent of the time. To know if the nodule is cancer, we may need to get more images before your next yearly screening exam. If the nodule has suspicious features (for example, it is large, has an odd shape or grows over time), we will refer you to a specialist for further testing.  Will my doctor also get the results?  Yes. Your doctor will get a copy of your results.  Is it okay to keep smoking now that there s a cancer screening exam?  No. Tobacco is one of the strongest cancer-causing agents. It causes not only lung cancer, but other cancers and cardiovascular (heart) diseases as well. The damage  caused by smoking builds over time. This means that the longer you smoke, the higher your risk of disease. While it is never too late to quit, the sooner you quit, the better.  Where can I find help to quit smoking?  The best way to prevent lung cancer is to stop smoking. If you have already quit smoking, congratulations and keep it up! For help on quitting smoking, please call [employer] at 3-785-QUITNOW (1-673.862.5910) or the American Cancer Society at 1-636.811.2208 to find local resources near you.  One-on-one health coaching:  If you d prefer to work individually with a health care provider on tobacco cessation, we offer:      Medication Therapy Management:  Our specially trained pharmacists work closely with you and your doctor to help you quit smoking.  Call [phone] or [phone] (toll free).    Preventive Health Recommendations  Female Ages 50 - 64    Yearly exam: See your health care provider every year in order to  o Review health changes.   o Discuss preventive care.    o Review your medicines if your doctor has prescribed any.      Get a Pap test every three years (unless you have an abnormal result and your provider advises testing more often).    If you get Pap tests with HPV test, you only need to test every 5 years, unless you have an abnormal result.     You do not need a Pap test if your uterus was removed (hysterectomy) and you have not had cancer.    You should be tested each year for STDs (sexually transmitted diseases) if you're at risk.     Have a mammogram every 1 to 2 years.    Have a colonoscopy at age 50, or have a yearly FIT test (stool test). These exams screen for colon cancer.      Have a cholesterol test every 5 years, or more often if advised.    Have a diabetes test (fasting glucose) every three years. If you are at risk for diabetes, you should have this test more often.     If you are at risk for osteoporosis (brittle bone disease), think about having a bone density scan  (DEXA).    Shots: Get a flu shot each year. Get a tetanus shot every 10 years.    Nutrition:     Eat at least 5 servings of fruits and vegetables each day.    Eat whole-grain bread, whole-wheat pasta and brown rice instead of white grains and rice.    Get adequate Calcium and Vitamin D.     Lifestyle    Exercise at least 150 minutes a week (30 minutes a day, 5 days a week). This will help you control your weight and prevent disease.    Limit alcohol to one drink per day.    No smoking.     Wear sunscreen to prevent skin cancer.     See your dentist every six months for an exam and cleaning.    See your eye doctor every 1 to 2 years.      How to Quit Smoking  Smoking is a hard habit to break. About 50% of all people who have ever smoked have been able to quit. Most people who still smoke want to quit. Here are some of the best ways to stop smoking.     Keep in mind the health benefits of quitting  The health benefits of quitting start right away. They keep improving the longer you go without smoking. Knowing this can help inspire you to stay on track. These benefits occur at any age. If you are 17 or 70, quitting is a good choice. Some of the health benefits after your last cigarette include:     After 20 minutes: Your blood pressure and pulse return to normal.    After 8 hours: Your oxygen levels return to normal.    After 2 days: Your ability to smell and taste start to improve as damaged nerves regrow.    After 2 to 3 weeks: Your circulation and lung function improve.    After 1 to 9 months: Your coughing, congestion, and shortness of breath decrease. Your tiredness decreases.    After 1 year: Your risk of heart attack decreases by 50%.    After 5 years: Your risk of lung cancer decreases by 50%. Your risk of stroke becomes the same as a nonsmoker s.  Go cold turkey  Most former smokers quit cold turkey. This means stopping all at once. Trying to cut back slowly often doesn't work as well. This may be because it  continues the habit of smoking. Also you may inhale more smoke while smoking fewer cigarettes. This leads to the same amount of nicotine in your body.   Get support  Support programs can be a big help, especially for heavy smokers. These groups offer lectures, ways to change behavior, and peer support. Here are some ways to find a support program:     Free national quitline 800-QUIT-NOW (495-702-3531)    [facility] quit-smoking programs    American Lung Association 261-467-4645    American Cancer Society [phone]  Support at home is important too. Family and friends can offer praise and reassurance. If the smoker in your life finds it hard to quit, encourage them to keep trying.   Try over-the-counter medicine  Nicotine replacement therapy may make it easier to quit. Some aids are available without a prescription. These include a nicotine patch, gum, and lozenges. But it's best to use these under the care of your healthcare provider. The skin patch gives a steady supply of nicotine. Nicotine gum and lozenges give short-time doses of low levels of nicotine. Both methods reduce the craving for cigarettes. If you have nausea, vomiting, dizziness, weakness, or a fast heartbeat, stop using these products. See your provider.   Ask about prescription medicine  After reviewing your smoking patterns and past attempts to quit, your healthcare provider may offer a prescription medicine such as bupropion, varenicline, a nicotine inhaler, or nasal spray. Each has advantages and side effects. Your provider can review these with you.   Keep trying  Most smokers make many attempts at quitting before they succeed. It s important not to give up.   To learn more  For more on how to quit smoking, try these resources:     www.cdc.gov/tobacco/quit_smoking/ 800-QUIT-NOW (087-963-7289)    www.smokefree.gov 877-44U-QUIT (746-905-0443)    www.lung.org/stop-smoking/ 800-LUNGUSA (062-734-5644)  [employer] last reviewed this educational content  on 12/1/2019 2000-2021 [employer]. All rights reserved. This information is not intended as a substitute for professional medical care. Always follow your healthcare professional's instructions.

## 2022-09-13 NOTE — PROGRESS NOTES
SUBJECTIVE:   CC: Puja Ocasio is an 57 year old woman who presents for preventive health visit.       Patient has been advised of split billing requirements and indicates understanding: Yes  HPI    She was seen a week ago for COPD exacerbation with pneumonia.  She finished her prednisone and azithromycin yesterday.  She is having improvement in symptoms, no body aches or chills and reduction of cough.  She does continue to be short of breath with any exertion.  She is interested in smoking cessation and would like to start with Chantix as she has done well with this in the past.  She is using her Dulera twice daily as prescribed and albuterol inhaler as needed.  She has never had lung cancer screening and is interested in this as well.  She has smoked for the past 30 years, approximately a pack of cigarettes a day equaling a 48-yvqu-eufg history.    She is having increase in her anxiety.  She has been on Effexor 150 mg daily for about 20 years.  She has tried to increase the dose in the past but became too jittery with this.  She notices her anxiety is worse during the day, likely related to stress or work.  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    She has noticed some tremors, worse in the morning in her hands and occasionally in her head.  She does not have tremors when she is resting but typically these occur when she is holding her hand out in front of her or reaching for something.  She does not notice it as often as her significant other does.  She thinks her dad may have had Parkinson's but she is unsure, he did have dementia.      Today's PHQ-2 Score:   PHQ-2 ( 1999 Pfizer) 9/13/2022   Q1: Little interest or pleasure in doing things 0   Q2: Feeling down, depressed or hopeless 0   PHQ-2 Score 0   PHQ-2 Total Score (12-17 Years)- Positive if 3 or more points; Administer PHQ-A if positive -   Q1: Little interest or pleasure in doing  things Not at all   Q2: Feeling down, depressed or hopeless Not at all   PHQ-2 Score 0       Abuse: Current or Past (Physical, Sexual or Emotional) - No  Do you feel safe in your environment? Yes        Social History     Tobacco Use     Smoking status: Current Every Day Smoker     Packs/day: 0.50     Years: 25.00     Pack years: 12.50     Types: Cigarettes     Smokeless tobacco: Never Used   Substance Use Topics     Alcohol use: Yes     Comment: Alcoholic Drinks/day: occasional         Alcohol Use 2022   Prescreen: >3 drinks/day or >7 drinks/week? No       Reviewed orders with patient.  Reviewed health maintenance and updated orders accordingly - Yes  BP Readings from Last 3 Encounters:   22 136/84   22 136/80   05/10/22 (!) 145/87    Wt Readings from Last 3 Encounters:   22 68.4 kg (150 lb 12.8 oz)   22 67.9 kg (149 lb 12.8 oz)   05/10/22 66.7 kg (147 lb)                  Patient Active Problem List   Diagnosis     Allergic rhinitis     Asthma     Panic attack     Tobacco use disorder     Tremor     Anxiety     Chronic obstructive pulmonary disease with acute exacerbation (H)     Past Surgical History:   Procedure Laterality Date     APPENDECTOMY OPEN      at age 16 for acute appendicitis      SECTION      x2     COLONOSCOPY  2013,lian - reported NL     OTHER SURGICAL HISTORY      11/10,14443.0,[place] LIGATE FALLOPIAN TUBE,uterine ablation, Dr. Kebede       Social History     Tobacco Use     Smoking status: Current Every Day Smoker     Packs/day: 0.50     Years: 25.00     Pack years: 12.50     Types: Cigarettes     Smokeless tobacco: Never Used   Substance Use Topics     Alcohol use: Yes     Comment: Alcoholic Drinks/day: occasional     Family History   Problem Relation Age of Onset     Colon Cancer Mother         Cancer-colon     Prostate Cancer Father         Cancer-prostate     Alzheimer Disease Father      Breast Cancer Maternal Aunt         Cancer-breast      Heart Disease Other         Heart Disease     Other - See Comments Other         COPD/Emphysema     Other - See Comments Other         Stroke         Current Outpatient Medications   Medication Sig Dispense Refill     albuterol (PROAIR HFA) 108 (90 Base) MCG/ACT inhaler INHALE 2 PUFFS INTO THE lungs EVERY 4 HOURS AS NEEDED 25.5 g 0     busPIRone (BUSPAR) 5 MG tablet Take 1 tablet daily in morning, may increase by 5 mg every 4 days until reaching 15 mg daily in am. May take second dose in pm if needed. 90 tablet 1     EPINEPHrine (ANY BX GENERIC EQUIV) 0.3 MG/0.3ML injection 2-pack Inject 0.3 mLs (0.3 mg) into the muscle once as needed for anaphylaxis For allergic reaction 2 each 1     mometasone-formoterol (DULERA) 200-5 MCG/ACT inhaler Inhale 2 puffs into the lungs 2 times daily 13 g 11     predniSONE (DELTASONE) 20 MG tablet Take 1 tablet (20 mg) by mouth daily for 5 days 5 tablet 0     Spacer/Aero-Holding Chambers (AEROCHAMBER MV) MISC For home use. 1 each 0     varenicline (CHANTIX ARMANDO) 0.5 MG X 11 & 1 MG X 42 tablet Take 0.5 mg tab daily for 3 days, THEN 0.5 mg tab twice daily for 4 days, THEN 1 mg twice daily. 53 tablet 0     venlafaxine (EFFEXOR XR) 150 MG 24 hr capsule Take 1 capsule (150 mg) by mouth daily 90 capsule 3     Allergies   Allergen Reactions     Tree Nuts  [Nuts] Anaphylaxis     Metronidazole Other (See Comments)     Pt can't remember.      Tetracycline Other (See Comments)     Pt can't remember.        Breast Cancer Screening:  Any new diagnosis of family breast, ovarian, or bowel cancer? No    FHS-7: No flowsheet data found.    Mammogram Screening: Recommended mammography every 1-2 years with patient discussion and risk factor consideration  Pertinent mammograms are reviewed under the imaging tab.    History of abnormal Pap smear: NO - age 30-65 PAP every 5 years with negative HPV co-testing recommended  PAP / HPV Latest Ref Rng & Units 7/9/2020   PAP (Historical) - NIL   HPV16 NEG:Negative  Negative   HPV18 NEG:Negative Negative   HRHPV NEG:Negative Positive(A)     Reviewed and updated as needed this visit by clinical staff   Tobacco  Allergies  Meds  Problems  Med Hx  Surg Hx  Fam Hx  Soc   Hx          Reviewed and updated as needed this visit by Provider   Tobacco  Allergies  Meds  Problems  Med Hx  Surg Hx  Fam Hx           Past Medical History:   Diagnosis Date     Abnormal cytological findings in specimens from other organs, systems and tissues     HPV+ PAP -, normal paps ,  (no endo cells)     Epilepsy without status epilepticus, not intractable (H)     No Comments Provided     Gastritis without bleeding     06,treated      Other specified postprocedural states      (negative)     Tinea pedis     2010     Tobacco use     Attempts at cessation: electric cigarette, patches, chantix.      Past Surgical History:   Procedure Laterality Date     APPENDECTOMY OPEN      at age 16 for acute appendicitis      SECTION      x2     COLONOSCOPY  2013,lian - reported NL     OTHER SURGICAL HISTORY      11/10,89950.0,[place] LIGATE FALLOPIAN TUBE,uterine ablation, Dr. Kebede       Review of Systems  CONSTITUTIONAL: NEGATIVE for fever, chills, change in weight  INTEGUMENTARY/SKIN: NEGATIVE for worrisome rashes, moles or lesions  EYES: NEGATIVE for vision changes or irritation  ENT: NEGATIVE for ear, mouth and throat problems  RESP: Positive for shortness of breath, recent pneumonia.  See above  BREAST: NEGATIVE for masses, tenderness or discharge  CV: NEGATIVE for chest pain, palpitations or peripheral edema  GI: NEGATIVE for nausea, abdominal pain, heartburn, or change in bowel habits  : NEGATIVE for unusual urinary or vaginal symptoms. No vaginal bleeding.  MUSCULOSKELETAL: NEGATIVE for significant arthralgias or myalgia  NEURO: NEGATIVE for weakness, dizziness or paresthesias.  Tremors as noted above.  PSYCHIATRIC: NEGATIVE for changes in mood  "or affect      OBJECTIVE:   /84 (BP Location: Right arm, Patient Position: Sitting, Cuff Size: Adult Regular)   Pulse 84   Temp 98.8  F (37.1  C)   Resp 22   Ht 1.575 m (5' 2\")   Wt 68.4 kg (150 lb 12.8 oz)   SpO2 91%   BMI 27.58 kg/m    Physical Exam  GENERAL APPEARANCE: healthy, alert and no distress  EYES: Eyes grossly normal to inspection, PERRL and conjunctivae and sclerae normal  HENT: ear canals and TM's normal, nose and mouth without ulcers or lesions, oropharynx clear and oral mucous membranes moist  NECK: no adenopathy, no asymmetry, masses, or scars and thyroid normal to palpation  RESP: Lung sounds continue to be tight, frequent expiratory wheezes, O2 sats 91% on room air.  Occasional wet cough.  BREAST: Declined  CV: regular rate and rhythm, normal S1 S2, no S3 or S4, no murmur, click or rub, no peripheral edema and peripheral pulses strong  ABDOMEN: soft, nontender, no hepatosplenomegaly, no masses and bowel sounds normal  MS: no musculoskeletal defects are noted and gait is age appropriate without ataxia  SKIN: no suspicious lesions or rashes  NEURO: Normal strength and tone, sensory exam grossly normal, mentation intact and speech normal.  Mild tremors to bilateral hands when she holds them out in front of her.  PSYCH: mentation appears normal and affect normal/bright    Diagnostic Test Results:  Labs reviewed in Epic    ASSESSMENT/PLAN:       ICD-10-CM    1. Routine general medical examination at a health care facility  Z00.00 Comprehensive Metabolic Panel   2. Tobacco use disorder  F17.200 varenicline (CHANTIX ARMANDO) 0.5 MG X 11 & 1 MG X 42 tablet   3. Panic attack  F41.0 venlafaxine (EFFEXOR XR) 150 MG 24 hr capsule   4. Chronic obstructive pulmonary disease with acute exacerbation (H)  J44.1 mometasone-formoterol (DULERA) 200-5 MCG/ACT inhaler   5. Personal history of tobacco use  Z87.891 Prof fee: Shared Decision Making for Lung Cancer Screening     CT Chest Lung Cancer Scrn Low Dose " wo     varenicline (CHANTIX ARMANDO) 0.5 MG X 11 & 1 MG X 42 tablet   6. COPD exacerbation (H)  J44.1 predniSONE (DELTASONE) 20 MG tablet   7. Lipid screening  Z13.220 Lipid Panel   8. Anxiety  F41.9 busPIRone (BUSPAR) 5 MG tablet   9. Encounter for screening mammogram for breast cancer  Z12.31 MA Screen Bilateral w/Akash   10. Screening for thyroid disorder  Z13.29 TSH Reflex GH   11. Tremor  R25.1    12. Nicotine dependence, uncomplicated, unspecified nicotine product type  F17.200    Prevnar updated today.  She declines influenza or COVID booster at this time but will consider this in the future.  Chantix ordered for smoking cessation, lung cancer screening ordered.  She will continue to work on smoking cessation with a goal of complete resolution.  Refilled Dulera inhaler, she is using this twice a day for her COPD.  Her lungs continue be quite tight and wheezy, will do a second course of prednisone to try to get this under little bit better control.  She was written a note for work to have reduce physical exertion while she is recovering as this does cause shortness of breath.  She is not fasting today, lipid panel, CMP ordered to be completed at a future date.  Mammogram ordered, this is due in November.  Due to tremors, thyroid levels were updated.  For her anxiety we opted to keep her Effexor at the dose she is currently at as she is tolerating this well.  We will have her start buspirone 5 mg daily.  She can increase this by 5 mg every 4 days as needed up to 15 mg in the morning.  She may also add a second dose of buspirone in the afternoon if needed.    Patient has been advised of split billing requirements and indicates understanding: Yes    COUNSELING:  Reviewed preventive health counseling, as reflected in patient instructions       Regular exercise       Healthy diet/nutrition       Vision screening       Immunizations    Vaccinated for: Pneumococcal             Consider lung cancer screening for ages 55-80  "years (77 for Medicare) and 20 pack-year smoking history        One time pneumovax for smokers    Estimated body mass index is 27.58 kg/m  as calculated from the following:    Height as of this encounter: 1.575 m (5' 2\").    Weight as of this encounter: 68.4 kg (150 lb 12.8 oz).    Weight management plan: Discussed healthy diet and exercise guidelines    She reports that she has been smoking cigarettes. She has a 12.50 pack-year smoking history. She has never used smokeless tobacco.  Nicotine/Tobacco Cessation Plan:   Pharmacotherapies : varenicline (Chantix)      Counseling Resources:  ATP IV Guidelines  Pooled Cohorts Equation Calculator  Breast Cancer Risk Calculator  BRCA-Related Cancer Risk Assessment: FHS-7 Tool  FRAX Risk Assessment  ICSI Preventive Guidelines  Dietary Guidelines for Americans, 2010  [employer] MyPlate  ASA Prophylaxis  Lung CA Screening    BLACK Car CNP  [facility] AND [facility]  Lung Cancer Screening Shared Decision Making Visit     Puja Ocasio, a 57 year old female, is eligible for lung cancer screening    History   Smoking Status     Current Every Day Smoker     Packs/day: 0.50     Years: 25.00     Types: Cigarettes   Smokeless Tobacco     Never Used       I have discussed with patient the risks and benefits of screening for lung cancer with low-dose CT.     The risks include:    radiation exposure: one low dose chest CT has as much ionizing radiation as about 15 chest x-rays, or 6 months of background radiation living in [place]      false positives: most findings/nodules are NOT cancer, but some might still require additional diagnostic evaluation, including biopsy    over-diagnosis: some slow growing cancers that might never have been clinically significant will be detected and treated unnecessarily     The benefit of early detection of lung cancer is contingent upon adherence to annual screening or more frequent follow up if indicated.     Furthermore, to " benefit from screening, Puja must be willing and able to undergo diagnostic procedures, if indicated. Although no specific guide is available for determining severity of comorbidities, it is reasonable to withhold screening in patients who have greater mortality risk from other diseases.     We did discuss that the best way to prevent lung cancer is to not smoke.    Some patients may value a numeric estimation of lung cancer risk when evaluating if lung cancer screening is right for them, here is one calculator:    ShouldIScreen

## 2022-09-20 ENCOUNTER — OFFICE VISIT (OUTPATIENT)
Dept: FAMILY MEDICINE | Facility: OTHER | Age: 57
End: 2022-09-20
Attending: NURSE PRACTITIONER
Payer: COMMERCIAL

## 2022-09-20 ENCOUNTER — HOSPITAL ENCOUNTER (OUTPATIENT)
Dept: GENERAL RADIOLOGY | Facility: OTHER | Age: 57
Discharge: HOME | End: 2022-09-20
Attending: NURSE PRACTITIONER
Payer: COMMERCIAL

## 2022-09-20 VITALS
HEIGHT: 62 IN | BODY MASS INDEX: 27.09 KG/M2 | HEART RATE: 105 BPM | DIASTOLIC BLOOD PRESSURE: 76 MMHG | RESPIRATION RATE: 28 BRPM | TEMPERATURE: 100.2 F | WEIGHT: 147.2 LBS | OXYGEN SATURATION: 89 % | SYSTOLIC BLOOD PRESSURE: 138 MMHG

## 2022-09-20 DIAGNOSIS — J44.1 CHRONIC OBSTRUCTIVE PULMONARY DISEASE WITH ACUTE EXACERBATION (H): ICD-10-CM

## 2022-09-20 DIAGNOSIS — J18.9 PNEUMONIA OF RIGHT LOWER LOBE DUE TO INFECTIOUS ORGANISM: ICD-10-CM

## 2022-09-20 DIAGNOSIS — R05.9 COUGH: Primary | ICD-10-CM

## 2022-09-20 DIAGNOSIS — R05.9 COUGH: ICD-10-CM

## 2022-09-20 PROCEDURE — 71046 X-RAY EXAM CHEST 2 VIEWS: CPT

## 2022-09-20 PROCEDURE — 99213 OFFICE O/P EST LOW 20 MIN: CPT | Performed by: NURSE PRACTITIONER

## 2022-09-20 RX ORDER — PREDNISONE 20 MG/1
40 TABLET ORAL DAILY
Qty: 10 TABLET | Refills: 0 | Status: SHIPPED | OUTPATIENT
Start: 2022-09-20 | End: 2022-09-25

## 2022-09-20 RX ORDER — CODEINE PHOSPHATE AND GUAIFENESIN 10; 100 MG/5ML; MG/5ML
1-2 SOLUTION ORAL EVERY 4 HOURS PRN
Qty: 473 ML | Refills: 0 | Status: SHIPPED | OUTPATIENT
Start: 2022-09-20 | End: 2023-09-07

## 2022-09-20 RX ORDER — LEVOFLOXACIN 750 MG/1
750 TABLET, FILM COATED ORAL DAILY
Qty: 10 TABLET | Refills: 0 | Status: SHIPPED | OUTPATIENT
Start: 2022-09-20 | End: 2022-09-30

## 2022-09-20 NOTE — NURSING NOTE
Patient presents today for follow up on cough and shortness of breath. Patient complains her cough and breathing have worsened.    Medication Reconciliation Complete    Adela Null LPN  9/20/2022 1:28 PM

## 2022-09-20 NOTE — PROGRESS NOTES
Assessment & Plan   Problem List Items Addressed This Visit        Respiratory    Chronic obstructive pulmonary disease with acute exacerbation (H)    Relevant Medications    levofloxacin (LEVAQUIN) 750 MG tablet    predniSONE (DELTASONE) 20 MG tablet    guaiFENesin-codeine (ROBITUSSIN AC) 100-10 MG/5ML solution      Other Visit Diagnoses     Cough    -  Primary    Relevant Medications    guaiFENesin-codeine (ROBITUSSIN AC) 100-10 MG/5ML solution    Other Relevant Orders    XR Chest 2 Views (Completed)    Pneumonia of right lower lobe due to infectious organism        Relevant Medications    levofloxacin (LEVAQUIN) 750 MG tablet    predniSONE (DELTASONE) 20 MG tablet    guaiFENesin-codeine (ROBITUSSIN AC) 100-10 MG/5ML solution      Chest x-ray does show some improvement but her symptoms have worsened including increased shortness of breath, low-grade temp.  We will treat with Levaquin x10 days, prednisone x5 days.  Cough syrup with codeine was given to help with sleep.  Plan to have close follow-up in the next 3 days, sooner if concerns.    Review of the result(s) of each unique test - Chest x-ray  Ordering of each unique test  Prescription drug management  26 minutes spent on the date of the encounter doing chart review, history and exam, documentation and further activities per the note           No follow-ups on file.    BLACK Car [facility] AND [facility]    Diana Luo is a 57 year old, presenting for the following health issues:  RECHECK      HPI     She comes in today for follow-up on pneumonia.  She was seen 12 days ago and treated with azithromycin and prednisone for COPD exacerbation as well as pneumonia.  She was then seen for her annual physical and was showing some improvement in symptoms.  She comes in today reporting symptoms have worsened over the past 24 hours.  She is having increased shortness of breath, productive cough and rib pain due to coughing.  She has  "felt feverish.  She been taking Mucinex for symptomatic management as well as her scheduled inhalers.  She did sleep last night hopped up on some pillows although her coughing made it difficult to sleep.      Review of Systems   See above      Objective    /76 (BP Location: Right arm, Patient Position: Sitting, Cuff Size: Adult Regular)   Pulse 105   Temp 100.2  F (37.9  C)   Resp 28   Ht 1.575 m (5' 2\")   Wt 66.8 kg (147 lb 3.2 oz)   SpO2 (!) 89%   BMI 26.92 kg/m    Body mass index is 26.92 kg/m .  Physical Exam   GENERAL: Mildly ill but no acute distress  EYES: Eyes grossly normal to inspection, PERRL and conjunctivae and sclerae normal  RESP: Lung sounds diminished, expiratory rhonchi to lower bases right worse than left, intermittent/scattered wheezes.  No respiratory distress, able to complete sentences and walked to x-ray and back without significant difficulties.  CV: regular rate and rhythm, normal S1 S  NEURO: Normal strength and tone, mentation intact and speech normal  PSYCH: mentation appears normal, affect normal/bright    Xray - Reviewed and interpreted by me.  Infiltrates have showed some improvement from 12 days ago                "

## 2022-09-21 ENCOUNTER — TELEPHONE (OUTPATIENT)
Dept: FAMILY MEDICINE | Facility: OTHER | Age: 57
End: 2022-09-21

## 2022-09-21 NOTE — LETTER
September 21, 2022      Puja Ocasio  [address] [place] [address]        To Whom It May Concern:    Puja Ocasio was seen in our clinic 9/20/2022. She needs to be out of work due to illness. Will re-evaluate at her follow up on Friday 9/23/2022.       Sincerely,        BLACK Car CNP

## 2022-09-21 NOTE — TELEPHONE ENCOUNTER
Patient is requesting a letter for work stating, she is unable to work due to illness until she has her follow up appointment on Friday. Letter can be sent via Farhad Null LPN on 9/21/2022 at 4:04 PM

## 2022-09-21 NOTE — TELEPHONE ENCOUNTER
The patient was seen yesterday at the clinic.  She needs a note for work saying she cannot go to work until after her follow appointment with the doctor on Friday morning.  She would like to pick this note up today at the [address] desk as she forget it yesterday after her appointment.  (her sister will be picking it up for her as she is still sick).  Please advise when note is ready.

## 2022-09-23 ENCOUNTER — OFFICE VISIT (OUTPATIENT)
Dept: FAMILY MEDICINE | Facility: OTHER | Age: 57
End: 2022-09-23
Attending: NURSE PRACTITIONER
Payer: COMMERCIAL

## 2022-09-23 VITALS
TEMPERATURE: 98.7 F | DIASTOLIC BLOOD PRESSURE: 78 MMHG | BODY MASS INDEX: 27.23 KG/M2 | WEIGHT: 148 LBS | HEART RATE: 92 BPM | HEIGHT: 62 IN | SYSTOLIC BLOOD PRESSURE: 128 MMHG | RESPIRATION RATE: 22 BRPM | OXYGEN SATURATION: 91 %

## 2022-09-23 DIAGNOSIS — J44.9 COPD, SEVERE (H): Primary | ICD-10-CM

## 2022-09-23 PROCEDURE — 99213 OFFICE O/P EST LOW 20 MIN: CPT | Performed by: NURSE PRACTITIONER

## 2022-09-23 RX ORDER — AZITHROMYCIN 250 MG/1
250 TABLET, FILM COATED ORAL
Qty: 3690 TABLET | Refills: 1 | Status: SHIPPED | OUTPATIENT
Start: 2022-09-23 | End: 2023-06-12

## 2022-09-23 ASSESSMENT — PAIN SCALES - GENERAL: PAINLEVEL: NO PAIN (0)

## 2022-09-23 NOTE — NURSING NOTE
Patient presents today for follow up on cough and shortness of breath.    Medication Reconciliation Complete    Adela Null LPN  9/23/2022 9:46 AM

## 2022-09-23 NOTE — LETTER
September 23, 2022      Puja Ocasio  [address] [place] [address]        To Whom It May Concern:    Puja Ocasio was seen in our clinic 9/23/2022. She should be out of work this next week to allow new medications to help with symptoms.     Sincerely,        BLACK Car CNP

## 2022-09-23 NOTE — PROGRESS NOTES
Assessment & Plan   Problem List Items Addressed This Visit    None     Visit Diagnoses     COPD, severe (H)    -  Primary    Relevant Medications    Fluticasone-Umeclidin-Vilanterol (TRELEGY ELLIPTA) 200-62.5-25 MCG/INH oral inhaler    azithromycin (ZITHROMAX) 250 MG tablet    Other Relevant Orders    Adult Pulmonary Medicine Referral      Severe COPD with recent exacerbation and pneumonia.  Despite treatment she continues to have significant shortness of breath.  She is otherwise improving regarding coughing, fatigue and any fevers.  At this time we will discontinue Dulera inhaler and have her start Trelegy Ellipta to include triple therapy for severe COPD.  She is also placed on azithromycin 250 mg 3 times weekly.  Referral to pulmonology was placed for further evaluation and management.  She has continued to smoke and has not started Chantix.  She was encouraged to start this to help with her smoking cessation journey.  Follow-up with myself as needed.      Prescription drug management  26 minutes spent on the date of the encounter doing chart review, history and exam, documentation and further activities per the note           No follow-ups on file.    BLACK Car [facility] AND \[facility]\""   Puja is a 57 year old, presenting for the following health issues:  RECHECK      History of Present Illness       Reason for visit:  Pneumonia    She eats 2-3 servings of fruits and vegetables daily.She consumes 3 sweetened beverage(s) daily.She exercises with enough effort to increase her heart rate 20 to 29 minutes per day.  She exercises with enough effort to increase her heart rate 7 days per week.   She is taking medications regularly.     She comes in today for follow-up on recent pneumonia.  She has been treated with 2 courses of antibiotics and 2 courses of prednisone.  She completed her last course of prednisone tomorrow and is mid treatment for Levaquin.  She reports she  "is having lessening coughing, fevers and body aches but is continuing significant shortness of breath despite use of Dulera and albuterol.  She is needing a note for work and would like to discuss further management of symptoms.      Review of Systems   See above      Objective    /78   Pulse 92   Temp 98.7  F (37.1  C)   Resp 22   Ht 1.575 m (5' 2\")   Wt 67.1 kg (148 lb)   SpO2 91%   BMI 27.07 kg/m    Body mass index is 27.07 kg/m .  Physical Exam   GENERAL: healthy, alert and no distress  EYES: Eyes grossly normal to inspection  RESP: Diminished breath sounds with scattered wheezing, rales and rhonchi.  O2 sats 91% on room air.  No respiratory distress appreciated.  CV: regular rate and rhythm, normal S1 S2  NEURO: Normal strength and tone, mentation intact and speech normal  PSYCH: mentation appears normal, affect normal/bright                    "

## 2022-09-23 NOTE — PATIENT INSTRUCTIONS
If you can't afford trelegy then would use Dulera 200-5 mcg-2 puffs twice daily and Incruse ellipta  62.5 mcg 1 inhaler daily    Start azithromycin 250 mg 3 times a week  Referred to pulmonology

## 2023-04-12 DIAGNOSIS — F41.9 ANXIETY: ICD-10-CM

## 2023-04-14 RX ORDER — BUSPIRONE HYDROCHLORIDE 5 MG/1
TABLET ORAL
Qty: 90 TABLET | Refills: 1 | Status: SHIPPED | OUTPATIENT
Start: 2023-04-14 | End: 2023-08-31

## 2023-04-14 NOTE — TELEPHONE ENCOUNTER
[facility] sent Rx request for the following:      Requested Prescriptions   Pending Prescriptions Disp Refills     busPIRone (BUSPAR) 5 MG tablet [Pharmacy Med Name: buspirone 5 mg tablet] 90 tablet 1     Sig: TAKE 1 TAB IN THE MORNING. INCREASE BY 1 TAB EVERY 4 DAYS UNTIL REACHING 3 TABS IN THE MORNING. MAY TAKE A 2ND DOSE IN THE EVENING IF NEEDED.   Last Prescription Date:   9/13/22  Last Fill Qty/Refills:         90, R-1    Last Office Visit:              9/23/22   Future Office visit:           None    Shoshana Gonzalez RN .............. 4/14/2023  12:53 PM

## 2023-06-12 ENCOUNTER — OFFICE VISIT (OUTPATIENT)
Dept: FAMILY MEDICINE | Facility: OTHER | Age: 58
End: 2023-06-12
Attending: NURSE PRACTITIONER
Payer: COMMERCIAL

## 2023-06-12 VITALS
SYSTOLIC BLOOD PRESSURE: 138 MMHG | TEMPERATURE: 99.4 F | HEIGHT: 62 IN | OXYGEN SATURATION: 96 % | BODY MASS INDEX: 28.87 KG/M2 | HEART RATE: 84 BPM | DIASTOLIC BLOOD PRESSURE: 88 MMHG | RESPIRATION RATE: 18 BRPM | WEIGHT: 156.9 LBS

## 2023-06-12 DIAGNOSIS — R06.02 SHORTNESS OF BREATH: ICD-10-CM

## 2023-06-12 DIAGNOSIS — J44.9 COPD, SEVERE (H): ICD-10-CM

## 2023-06-12 DIAGNOSIS — R05.2 SUBACUTE COUGH: ICD-10-CM

## 2023-06-12 DIAGNOSIS — J44.1 CHRONIC OBSTRUCTIVE PULMONARY DISEASE WITH ACUTE EXACERBATION (H): Primary | ICD-10-CM

## 2023-06-12 LAB
FLUAV RNA SPEC QL NAA+PROBE: NEGATIVE
FLUBV RNA RESP QL NAA+PROBE: NEGATIVE
RSV RNA SPEC NAA+PROBE: NEGATIVE
SARS-COV-2 RNA RESP QL NAA+PROBE: NEGATIVE

## 2023-06-12 PROCEDURE — C9803 HOPD COVID-19 SPEC COLLECT: HCPCS | Performed by: NURSE PRACTITIONER

## 2023-06-12 PROCEDURE — 99213 OFFICE O/P EST LOW 20 MIN: CPT | Performed by: NURSE PRACTITIONER

## 2023-06-12 PROCEDURE — 87637 SARSCOV2&INF A&B&RSV AMP PRB: CPT | Mod: ZL | Performed by: NURSE PRACTITIONER

## 2023-06-12 RX ORDER — AZITHROMYCIN 250 MG/1
TABLET, FILM COATED ORAL
Qty: 6 TABLET | Refills: 0 | Status: SHIPPED | OUTPATIENT
Start: 2023-06-12 | End: 2023-06-17

## 2023-06-12 RX ORDER — AZITHROMYCIN 250 MG/1
250 TABLET, FILM COATED ORAL
Qty: 3690 TABLET | Refills: 1 | Status: SHIPPED | OUTPATIENT
Start: 2023-06-12 | End: 2023-06-12

## 2023-06-12 RX ORDER — PREDNISONE 20 MG/1
40 TABLET ORAL DAILY
Qty: 10 TABLET | Refills: 0 | Status: SHIPPED | OUTPATIENT
Start: 2023-06-12 | End: 2023-06-16

## 2023-06-12 ASSESSMENT — ENCOUNTER SYMPTOMS
FEVER: 1
SHORTNESS OF BREATH: 1
MUSCULOSKELETAL NEGATIVE: 1
CARDIOVASCULAR NEGATIVE: 1
COUGH: 0
WHEEZING: 1
GASTROINTESTINAL NEGATIVE: 1
NEUROLOGICAL NEGATIVE: 1

## 2023-06-12 ASSESSMENT — PAIN SCALES - GENERAL: PAINLEVEL: NO PAIN (0)

## 2023-06-12 NOTE — PROGRESS NOTES
Puja Ocasio  1965    ASSESSMENT/PLAN:   1. Chronic obstructive pulmonary disease with acute exacerbation (H)  3. Cough  4. Shortness of breath  - predniSONE (DELTASONE) 20 MG tablet; Take 2 tablets (40 mg) by mouth daily for 5 days  Dispense: 10 tablet; Refill: 0  - azithromycin (ZITHROMAX) 250 MG tablet; Take 2 tablets (500 mg) by mouth daily for 1 day, THEN 1 tablet (250 mg) daily for 4 days.  Dispense: 6 tablet; Refill: 0  - Symptomatic Influenza A/B, RSV, & SARS-CoV2 PCR (COVID-19) Nose    Patient presents with 2 days of increased shortness of breath, wheezing, chest tightness and 1 day of cough.  Low-grade fever today, 99.4.  Oxygen 96%.  She is nontachycardic, blood pressure stable 138/88.  On exam she has scattered wheezing throughout bilateral lobes.  No other URI symptoms stated with shortness of breath.  Reviewed with patient that she likely has a viral infection causing her symptoms.  Tested negative for influenza, RSV and COVID-19.    With symptoms only being present for 1 to 2 days, I would not recommend proceeding with chest x-ray at this time, reviewed this would not show a bacterial pneumonia at this time.   With her history of COPD, I recommend treatment for COPD exacerbation.  I have sent in prescription for azithromycin once a day for 5 days, prednisone 40 mg once daily for 5 days.  She does have her inhalers to use as needed cough and shortness of breath.  Discussed adding in a daily nondrowsy histamine such as Allegra, Claritin or Zyrtec.  Patient feels comfortable with this plan of care.  Strict ER precautions are given should symptoms worsen or persist.  She has follow-up appointment scheduled with her PCP next week to ensure improvement and resolution of symptoms.    Patient agrees with plan of care and verbalizes understating. AVS printed. Patient education provided verbally and written instructions provided as requested. Patient made aware of emergent sings and symptoms to  "monitor for and when to seek additional care/follow up.     SUBJECTIVE:   CHIEF COMPLAINT/ REASON FOR VISIT  Patient presents with:  Cough     HISTORY OF PRESENT ILLNESS  Puja Ocasio is a pleasant 57 year old female presents to rapid clinic today with concerns for shortness of breath.  Patient states she has had shortness of breath over the past few days.  She states it is difficult to take a deep breath, she feels wheezy.  She has had a cough for the past day.  She denies any other URI symptoms.  No earache, sore throat or headache.  Did feel achy on Friday, no other fever, chills or body aches.  Has been trialing albuterol and her daily inhaler as prescribed.  She is also been taking Mucinex.  She states she is very prone to bronchitis as well as pneumonia.  She has low-grade fever in clinic today, 99.4.    I have reviewed the nursing notes.  I have reviewed allergies, medication list, problem list, and past medical history.    REVIEW OF SYSTEMS  Review of Systems   Constitutional: Positive for fever.   HENT: Negative.    Respiratory: Positive for shortness of breath and wheezing. Negative for cough.    Cardiovascular: Negative.  Negative for chest pain.   Gastrointestinal: Negative.    Musculoskeletal: Negative.    Neurological: Negative.         VITAL SIGNS  Vitals:    06/12/23 1833   BP: 138/88   BP Location: Right arm   Patient Position: Sitting   Cuff Size: Adult Regular   Pulse: 84   Resp: 18   Temp: 99.4  F (37.4  C)   TempSrc: Tympanic   SpO2: 96%   Weight: 71.2 kg (156 lb 14.4 oz)   Height: 1.57 m (5' 1.81\")      Body mass index is 28.87 kg/m .    OBJECTIVE:   PHYSICAL EXAM  Physical Exam  Vitals reviewed.   Constitutional:       Appearance: Normal appearance. She is not ill-appearing or toxic-appearing.   HENT:      Head: Normocephalic and atraumatic.      Right Ear: Tympanic membrane, ear canal and external ear normal.      Left Ear: Tympanic membrane, ear canal and external ear normal.      " Nose: Nose normal. No rhinorrhea.      Mouth/Throat:      Pharynx: No posterior oropharyngeal erythema.   Eyes:      Conjunctiva/sclera: Conjunctivae normal.   Cardiovascular:      Rate and Rhythm: Normal rate and regular rhythm.      Pulses: Normal pulses.      Heart sounds: Normal heart sounds. No murmur heard.  Pulmonary:      Effort: Pulmonary effort is normal.      Breath sounds: Wheezing present. No rhonchi or rales.      Comments: Patient has scattered inspiratory and expiratory wheezes throughout lobes.  Musculoskeletal:      Cervical back: Neck supple.   Neurological:      General: No focal deficit present.      Mental Status: She is alert and oriented to person, place, and time.   Psychiatric:         Mood and Affect: Mood normal.         Behavior: Behavior normal.         Thought Content: Thought content normal.         Judgment: Judgment normal.          DIAGNOSTICS  Results for orders placed or performed in visit on 06/12/23   Symptomatic Influenza A/B, RSV, & SARS-CoV2 PCR (COVID-19) Nose     Status: Normal    Specimen: Nose; Swab   Result Value Ref Range    Influenza A PCR Negative Negative    Influenza B PCR Negative Negative    RSV PCR Negative Negative    SARS CoV2 PCR Negative Negative    Narrative    Testing was performed using the Xpert Xpress CoV2/Flu/RSV Assay on the Cepheid GeneXpert Instrument. This test should be ordered for the detection of SARS-CoV-2, influenza, and RSV viruses in individuals who meet clinical and/or epidemiological criteria. Test performance is unknown in asymptomatic patients. This test is for in vitro diagnostic use under the FDA EUA for laboratories certified under CLIA to perform high or moderate complexity testing. This test has not been FDA cleared or approved. A negative result does not rule out the presence of PCR inhibitors in the specimen or target RNA in concentration below the limit of detection for the assay. If only one viral target is positive but  coinfection with multiple targets is suspected, the sample should be re-tested with another FDA cleared, approved, or authorized test, if coinfection would change clinical management. This test was validated by the [facility]. These laboratories are certified under the Clinical Laboratory Improvement Amendments of 1988 (CLIA-88) as qualified to perform high complexity laboratory testing.        Adela Oneill NP  [facility]

## 2023-06-12 NOTE — NURSING NOTE
Chief Complaint   Patient presents with     Cough     Patient presents to the clinic for shortness of breath, cough, and body aches started on Friday.     Alejandra Rubio LPN       FOOD SECURITY SCREENING QUESTIONS:    The next two questions are to help us understand your food security.  If you are feeling you need any assistance in this area, we have resources available to support you today.    Hunger Vital Signs:  Within the past 12 months we worried whether our food would run out before we got money to buy more. Never  Within the past 12 months the food we bought just didn't last and we didn't have money to get more. Never  lAejandra Rubio LPN,LPN on 6/12/2023 at 6:29 PM    Food Insecurity: Not on file

## 2023-06-16 ENCOUNTER — HOSPITAL ENCOUNTER (OUTPATIENT)
Dept: GENERAL RADIOLOGY | Facility: OTHER | Age: 58
Discharge: HOME | End: 2023-06-16
Attending: NURSE PRACTITIONER
Payer: COMMERCIAL

## 2023-06-16 ENCOUNTER — OFFICE VISIT (OUTPATIENT)
Dept: FAMILY MEDICINE | Facility: OTHER | Age: 58
End: 2023-06-16
Attending: NURSE PRACTITIONER
Payer: COMMERCIAL

## 2023-06-16 VITALS
HEART RATE: 88 BPM | RESPIRATION RATE: 20 BRPM | WEIGHT: 154 LBS | OXYGEN SATURATION: 92 % | TEMPERATURE: 99.3 F | DIASTOLIC BLOOD PRESSURE: 76 MMHG | SYSTOLIC BLOOD PRESSURE: 138 MMHG | BODY MASS INDEX: 28.34 KG/M2

## 2023-06-16 DIAGNOSIS — J44.1 CHRONIC OBSTRUCTIVE PULMONARY DISEASE WITH ACUTE EXACERBATION (H): Primary | ICD-10-CM

## 2023-06-16 DIAGNOSIS — J44.1 CHRONIC OBSTRUCTIVE PULMONARY DISEASE WITH ACUTE EXACERBATION (H): ICD-10-CM

## 2023-06-16 DIAGNOSIS — F17.200 TOBACCO USE DISORDER: ICD-10-CM

## 2023-06-16 PROCEDURE — 71046 X-RAY EXAM CHEST 2 VIEWS: CPT

## 2023-06-16 PROCEDURE — 99213 OFFICE O/P EST LOW 20 MIN: CPT | Performed by: NURSE PRACTITIONER

## 2023-06-16 RX ORDER — PREDNISONE 20 MG/1
TABLET ORAL
Qty: 20 TABLET | Refills: 0 | Status: SHIPPED | OUTPATIENT
Start: 2023-06-16 | End: 2023-08-31

## 2023-06-16 RX ORDER — AZITHROMYCIN 250 MG/1
TABLET, FILM COATED ORAL
Qty: 90 TABLET | Refills: 1 | Status: SHIPPED | OUTPATIENT
Start: 2023-06-16 | End: 2023-08-31

## 2023-06-16 NOTE — PROGRESS NOTES
Assessment & Plan   Problem List Items Addressed This Visit        Respiratory    Chronic obstructive pulmonary disease with acute exacerbation (H) - Primary    Relevant Medications    azithromycin (ZITHROMAX) 250 MG tablet    predniSONE (DELTASONE) 20 MG tablet    varenicline (CHANTIX ARMANDO) 0.5 MG X 11 & 1 MG X 42 tablet    Other Relevant Orders    XR Chest 2 Views (Completed)    Adult Pulmonary Medicine Referral       Behavioral    Tobacco use disorder    Relevant Medications    varenicline (CHANTIX ARMANDO) 0.5 MG X 11 & 1 MG X 42 tablet      Chest x-ray updated today, no acute infiltrate noted.  Given her ongoing shortness of breath, extend her prednisone to a full taper.  Placed her back on azithromycin 250 mg 3 times weekly.  Ordered Chantix per request for smoking cessation and referred back to pulmonary medicine for further evaluation and management.  Close follow-up if symptoms persist, worsen or not improving.    Review of the result(s) of each unique test - xray  Ordering of each unique test  Prescription drug management  26 minutes spent by me on the date of the encounter doing chart review, history and exam, documentation and further activities per the note       No follow-ups on file.    [facility] \[facility]""    Diana Luo is a 57 year old, presenting for the following health issues:  RECHECK (Shortness of breath)    History of Present Illness       COPD:  She presents for follow up of COPD.  Overall, COPD symptoms are much worse since last visit. She has a lot more than usual fatigue or shortness of breath with exertion and more than usual shortness of breath at rest.  She often coughs and does not have change in sputum. Patient has had recent fever. She can walk less than 1 block without stopping to rest. She can walk 3 or more flights of stairs without resting.The patient has had no ED, urgent care, or hospital admissions because of COPD since the last  visit.     She eats 2-3 servings of fruits and vegetables daily.She consumes 0 sweetened beverage(s) daily.She exercises with enough effort to increase her heart rate 20 to 29 minutes per day.    She is taking medications regularly.     She comes in today for follow-up.  She was seen in the rapid clinic 4 days ago for cough and shortness of breath.  She was treated with 5 days of azithromycin, 5 days of prednisone.  She felt she was getting slightly better but her oxygen levels continue to be low.  Cough is productive.  She continues smoke half pack cigarettes a day.  She is been taking Mucinex for symptomatic management.  Previously she was placed on azithromycin 250 mg 3 times a week, Trelegy inhaler and referral to pulmonology.  Somewhere along the line she stopped the azithromycin and never had her pulmonary follow-up.  She is not having any fevers at this time.      Review of Systems   The above      Objective    /76   Pulse 88   Temp 99.3  F (37.4  C)   Resp 20   Wt 69.9 kg (154 lb)   SpO2 92%   BMI 28.34 kg/m    Body mass index is 28.34 kg/m .  Physical Exam   GENERAL: healthy, alert and no distress  EYES: Eyes grossly normal to inspection, PERRL and conjunctivae and sclerae normal  HENT: ear canals and TM's normal, nose and mouth without ulcers or lesions  NECK: no adenopathy, no asymmetry, masses, or scars and thyroid normal to palpation  RESP: Congestion and wheezes throughout, O2 sats 92% on room air, frequent cough, no respiratory distress  CV: regular rate and rhythm, normal S1 S2  NEURO: Normal strength and tone, mentation intact and speech normal  PSYCH: mentation appears normal, affect normal/bright    Results for orders placed or performed during the hospital encounter of 06/16/23   XR Chest 2 Views     Status: None    Narrative    XR CHEST 2 VIEWS    HISTORY: 57 years Female Chronic obstructive pulmonary disease with  acute exacerbation (H)    COMPARISON: 9/20/2022    TECHNIQUE: 2 views  of the chest were obtained.    FINDINGS: Lung volumes are high. There is flattening of the  hemidiaphragms as was seen previously.    Heart size and pulmonary vascularity within normal limits. Lungs are  clear.        Impression    IMPRESSION: High lung volumes compatible with chronic air-trapping  similar to that seen previously. Lungs are otherwise clear.    YULIYA SCHERER MD         SYSTEM ID:  [id number]

## 2023-06-16 NOTE — NURSING NOTE
Patient presents today for shortness of breath.    Medication Reconciliation Complete    Adela Null LPN  6/16/2023 2:52 PM

## 2023-06-30 ENCOUNTER — HOSPITAL ENCOUNTER (OUTPATIENT)
Dept: GENERAL RADIOLOGY | Facility: OTHER | Age: 58
Discharge: HOME | End: 2023-06-30
Attending: NURSE PRACTITIONER
Payer: COMMERCIAL

## 2023-06-30 ENCOUNTER — OFFICE VISIT (OUTPATIENT)
Dept: FAMILY MEDICINE | Facility: OTHER | Age: 58
End: 2023-06-30
Payer: COMMERCIAL

## 2023-06-30 VITALS
HEIGHT: 61 IN | DIASTOLIC BLOOD PRESSURE: 98 MMHG | OXYGEN SATURATION: 96 % | HEART RATE: 98 BPM | TEMPERATURE: 98.4 F | RESPIRATION RATE: 18 BRPM | WEIGHT: 157.4 LBS | SYSTOLIC BLOOD PRESSURE: 154 MMHG | BODY MASS INDEX: 29.72 KG/M2

## 2023-06-30 DIAGNOSIS — R07.89 CHEST WALL PAIN: ICD-10-CM

## 2023-06-30 DIAGNOSIS — S29.9XXA SOFT TISSUE INJURY OF RIGHT CHEST WALL: ICD-10-CM

## 2023-06-30 DIAGNOSIS — J90 PLEURAL EFFUSION: Primary | ICD-10-CM

## 2023-06-30 PROCEDURE — 71046 X-RAY EXAM CHEST 2 VIEWS: CPT | Mod: TC

## 2023-06-30 PROCEDURE — 99213 OFFICE O/P EST LOW 20 MIN: CPT | Performed by: NURSE PRACTITIONER

## 2023-06-30 ASSESSMENT — ENCOUNTER SYMPTOMS
MYALGIAS: 1
CHEST TIGHTNESS: 0
COUGH: 0
FATIGUE: 0
FEVER: 0
LIGHT-HEADEDNESS: 0
DIZZINESS: 0
RHINORRHEA: 0
SHORTNESS OF BREATH: 1
DIAPHORESIS: 0
WHEEZING: 0

## 2023-06-30 ASSESSMENT — PAIN SCALES - GENERAL: PAINLEVEL: SEVERE PAIN (6)

## 2023-06-30 NOTE — NURSING NOTE
Chief Complaint   Patient presents with     Back Pain     Patient presents to the clinic for back pain, she fell last week and stated her back pain is not getting better.     Alejandra Rubio LPN       FOOD SECURITY SCREENING QUESTIONS:    The next two questions are to help us understand your food security.  If you are feeling you need any assistance in this area, we have resources available to support you today.    Hunger Vital Signs:  Within the past 12 months we worried whether our food would run out before we got money to buy more. Never  Within the past 12 months the food we bought just didn't last and we didn't have money to get more. Never  Alejandra Rubio LPN,LPN on 6/30/2023 at 6:51 PM    Food Insecurity: Not on file

## 2023-07-01 NOTE — PATIENT INSTRUCTIONS
Results for orders placed or performed during the hospital encounter of 06/30/23   XR Chest 2 Views     Status: None    Narrative    PROCEDURE INFORMATION:   Exam: XR Chest   Exam date and time: 6/30/2023 8:08 PM   Age: 58 years old   Clinical indication: Other chest pain; Unspecified injury of thorax, initial   encounter; Chest wall pain; Additional info: Chest wall pain; Soft tissue   injury of right chest wall     TECHNIQUE:   Imaging protocol: Radiologic exam of the chest.   Views: 2 views.     COMPARISON:   CR XR CHEST 2 VIEWS 6/16/2023 3:19 PM     FINDINGS:   Lungs: Possible emphysematous changes. Patchy right infrahilar and right   basilar airspace opacities.   Pleural spaces: No pneumothorax. Probable small right pleural effusion. No   large left pleural effusion.   Heart/Mediastinum:  Prominent cardiac silhouette. No mediastinal mass.   Bones/joints: No acute fracture or neoplastic osseous lesion. Mild multilevel   degenerative changes present within the spine.Osteopenia.      Impression    IMPRESSION:   1.   Patchy right infrahilar and right basilar pneumonitis versus atelectasis   versus lung contusion with probable small right pleural effusion.   2.   Remainder of findings described as above.     THIS DOCUMENT HAS BEEN ELECTRONICALLY SIGNED BY BARBARA MCKEON MD

## 2023-07-01 NOTE — PROGRESS NOTES
Assessment & Plan     ICD-10-CM    1. Pleural effusion  J90 XR Chest 2 Views      2. Soft tissue injury of right chest wall  S29.9XXA XR Chest 2 Views      3. Chest wall pain  R07.89 XR Chest 2 Views      Vital signs stable. She does not appear to be short of breath at rest. She denies any chest tightness but does report right upper chest wall pain that wraps around to her right upper back. On exam today she is noted to have turbulent airflow noted throughout her right lung fields compared to her left. Given the change in breath sounds and that she has had an injury to the area plan to obtain chest x-ray to rule out any acute pulmonary process such as pneumothorax.    Your XR shows a possible small pleural effusion, or fluid collection between the layers of the pleura outside the lungs. At this time treatment consists of rest, ice, and NSAIDS. If you start to develop a fever or worsening shortness of breath, please come in to be evaluated. Explained that typically a pleural effusion lasts 1-2 weeks. I have ordered a repeat chest XR on or around 7/10/23 to ensure this is resolving.     UpToDate consulted for best practices and current guidelines for treatment of visit diagnoses.  Discussed signs/ symptoms that would warrant urgent/ emergent follow-up. Patient in agreement with plan. AVS reviewed with patient. All questions and concerns addressed this visit.       Encouraged regular exercise.     Return if symptoms worsen or fail to improve.    [facility] NURSE  [facility]      Subjective   Puja is a 58 year old, presenting for the following health issues:  Back Pain      Last Saturday was cleaning camper and slid down the stairs where she hit the right upper back on the stairs. Pain 5-6 at rest. Taking 800 mg ibuprofen q 6 hours which finds moderate relief. Right back hurts the worst, but the pain is wrapping around towards the front since last night. The pain woke her up around 3 AM. She does  "have a diagnosis of  COPD and was seen in clinic on June 16 where she was prescribed prednisone and azithromycin with additional three times weekly dosing. Chest x-ray at that time showed findings consistent with COPD.    She reports that she was feeling better after her course of prednisone and antibiotics but that after her fall, her symptoms seemed to \"come right back\".        Review of Systems   Constitutional: Negative for diaphoresis, fatigue and fever.   HENT: Negative for congestion, postnasal drip and rhinorrhea.    Respiratory: Positive for shortness of breath. Negative for cough, chest tightness and wheezing.         COPD   Cardiovascular: Positive for chest pain (right upper back, extending to right upper chest wall. ).        HTN   Musculoskeletal: Positive for myalgias.        Fall 6/24/23   Neurological: Negative for dizziness and light-headedness.   All other systems reviewed and are negative.           Objective    BP (!) 154/98 (BP Location: Left arm, Patient Position: Sitting, Cuff Size: Adult Regular)   Pulse 98   Temp 98.4  F (36.9  C) (Tympanic)   Resp 18   Ht 1.549 m (5' 1\")   Wt 71.4 kg (157 lb 6.4 oz)   SpO2 96%   BMI 29.74 kg/m    Body mass index is 29.74 kg/m .  Physical Exam  Vitals reviewed.   Constitutional:       Appearance: Normal appearance.   HENT:      Head: Normocephalic and atraumatic.   Cardiovascular:      Rate and Rhythm: Normal rate and regular rhythm.   Pulmonary:      Effort: No tachypnea, accessory muscle usage, respiratory distress or retractions.      Breath sounds: Decreased air movement present. Examination of the right-upper field reveals decreased breath sounds and rales. Decreased breath sounds and rales present.   Chest:      Chest wall: Tenderness (right) present.   Musculoskeletal:         General: Normal range of motion.   Skin:     General: Skin is warm and dry.      Capillary Refill: Capillary refill takes less than 2 seconds.   Neurological:      " Mental Status: She is alert.          Results for orders placed or performed during the hospital encounter of 06/30/23   XR Chest 2 Views     Status: None    Narrative    PROCEDURE INFORMATION:   Exam: XR Chest   Exam date and time: 6/30/2023 8:08 PM   Age: 58 years old   Clinical indication: Other chest pain; Unspecified injury of thorax, initial   encounter; Chest wall pain; Additional info: Chest wall pain; Soft tissue   injury of right chest wall     TECHNIQUE:   Imaging protocol: Radiologic exam of the chest.   Views: 2 views.     COMPARISON:   CR XR CHEST 2 VIEWS 6/16/2023 3:19 PM     FINDINGS:   Lungs: Possible emphysematous changes. Patchy right infrahilar and right   basilar airspace opacities.   Pleural spaces: No pneumothorax. Probable small right pleural effusion. No   large left pleural effusion.   Heart/Mediastinum:  Prominent cardiac silhouette. No mediastinal mass.   Bones/joints: No acute fracture or neoplastic osseous lesion. Mild multilevel   degenerative changes present within the spine.Osteopenia.      Impression    IMPRESSION:   1.   Patchy right infrahilar and right basilar pneumonitis versus atelectasis   versus lung contusion with probable small right pleural effusion.   2.   Remainder of findings described as above.     THIS DOCUMENT HAS BEEN ELECTRONICALLY SIGNED BY BARBARA MCKEON MD

## 2023-08-31 ENCOUNTER — OFFICE VISIT (OUTPATIENT)
Dept: FAMILY MEDICINE | Facility: OTHER | Age: 58
End: 2023-08-31
Attending: NURSE PRACTITIONER
Payer: COMMERCIAL

## 2023-08-31 VITALS
OXYGEN SATURATION: 92 % | DIASTOLIC BLOOD PRESSURE: 82 MMHG | WEIGHT: 157.6 LBS | RESPIRATION RATE: 18 BRPM | HEIGHT: 61 IN | SYSTOLIC BLOOD PRESSURE: 136 MMHG | BODY MASS INDEX: 29.76 KG/M2 | TEMPERATURE: 98.4 F | HEART RATE: 90 BPM

## 2023-08-31 DIAGNOSIS — J44.1 CHRONIC OBSTRUCTIVE PULMONARY DISEASE WITH ACUTE EXACERBATION (H): Primary | ICD-10-CM

## 2023-08-31 DIAGNOSIS — B36.0 PITYRIASIS VERSICOLOR: ICD-10-CM

## 2023-08-31 PROCEDURE — 99213 OFFICE O/P EST LOW 20 MIN: CPT | Performed by: NURSE PRACTITIONER

## 2023-08-31 RX ORDER — DOXYCYCLINE 100 MG/1
100 CAPSULE ORAL 2 TIMES DAILY
Qty: 20 CAPSULE | Refills: 0 | Status: SHIPPED | OUTPATIENT
Start: 2023-08-31 | End: 2023-09-10

## 2023-08-31 RX ORDER — FLUCONAZOLE 150 MG/1
TABLET ORAL
Qty: 4 TABLET | Refills: 0 | Status: SHIPPED | OUTPATIENT
Start: 2023-08-31 | End: 2023-09-22

## 2023-08-31 RX ORDER — AZITHROMYCIN 250 MG/1
TABLET, FILM COATED ORAL
Qty: 90 TABLET | Refills: 3 | Status: SHIPPED | OUTPATIENT
Start: 2023-08-31 | End: 2024-10-03

## 2023-08-31 RX ORDER — PREDNISONE 20 MG/1
TABLET ORAL
Qty: 20 TABLET | Refills: 0 | Status: SHIPPED | OUTPATIENT
Start: 2023-08-31 | End: 2023-09-22

## 2023-08-31 ASSESSMENT — ENCOUNTER SYMPTOMS
FEVER: 1
COUGH: 1

## 2023-08-31 ASSESSMENT — PAIN SCALES - GENERAL: PAINLEVEL: NO PAIN (0)

## 2023-08-31 NOTE — NURSING NOTE
"Patient presents to the clinic for cough, fever, chills    FOOD SECURITY SCREENING QUESTIONS:    The next two questions are to help us understand your food security.  If you are feeling you need any assistance in this area, we have resources available to support you today.    Hunger Vital Signs:  Within the past 12 months we worried whether our food would run out before we got money to buy more. Never  Within the past 12 months the food we bought just didn't last and we didn't have money to get more. Never    Advance Care Directive on file? No  Advance Care Directive provided to patient? Declined     Chief Complaint   Patient presents with    Fever    Cough       Initial /82 (BP Location: Right arm, Patient Position: Sitting, Cuff Size: Adult Regular)   Pulse 90   Temp 98.4  F (36.9  C) (Temporal)   Resp 18   Ht 1.549 m (5' 1\")   Wt 71.5 kg (157 lb 9.6 oz)   SpO2 92%   BMI 29.78 kg/m   Estimated body mass index is 29.78 kg/m  as calculated from the following:    Height as of this encounter: 1.549 m (5' 1\").    Weight as of this encounter: 71.5 kg (157 lb 9.6 oz).  Medication Reconciliation: complete        Aida Corado LPN on 8/31/2023 at 2:44 PM    "

## 2023-08-31 NOTE — PROGRESS NOTES
"  Assessment & Plan   Problem List Items Addressed This Visit          Respiratory    Chronic obstructive pulmonary disease with acute exacerbation (H) - Primary    Relevant Medications    predniSONE (DELTASONE) 20 MG tablet    azithromycin (ZITHROMAX) 250 MG tablet    doxycycline hyclate (VIBRAMYCIN) 100 MG capsule     Other Visit Diagnoses       Pityriasis versicolor        Relevant Medications    predniSONE (DELTASONE) 20 MG tablet    fluconazole (DIFLUCAN) 150 MG tablet        COPD exacerbation.  We will have her hold azithromycin, start doxycycline twice daily for 10 days, when she is completed the doxycycline she should restart azithromycin 3 times a week.  Prednisone taper provided.  For pityriasis versicolor, will do a trial of fluconazole.  Follow-up as needed.    Prescription drug management         Nicotine/Tobacco Cessation:  She reports that she has been smoking cigarettes. She has a 12.50 pack-year smoking history. She has never used smokeless tobacco.  Nicotine/Tobacco Cessation Plan:         BMI:   Estimated body mass index is 29.78 kg/m  as calculated from the following:    Height as of this encounter: 1.549 m (5' 1\").    Weight as of this encounter: 71.5 kg (157 lb 9.6 oz).           No follow-ups on file.    [facility]    [facility]   Puja is a 58 year old, presenting for the following health issues:  Fever and Cough      8/31/2023     2:37 PM   Additional Questions   Roomed by Adia CHIU   Accompanied by Self       History of Present Illness       Reason for visit:  Sick  Symptom onset:  1-3 days ago  Symptom intensity:  Severe  Symptom progression:  Worsening  Had these symptoms before:  Yes  Has tried/received treatment for these symptoms:  Yes  Previous treatment was successful:  Yes  Prior treatment description:  Medicine  What makes it worse:  No  What makes it better:  No    She eats 2-3 servings of fruits and vegetables daily.She consumes 0 " "sweetened beverage(s) daily.She exercises with enough effort to increase her heart rate 20 to 29 minutes per day.  She exercises with enough effort to increase her heart rate 5 days per week.   She is taking medications regularly.       Acute Illness  Acute illness concerns: Breathing issues  Onset/Duration: 8/29/23  Symptoms:  Fever: YES  Chills/Sweats: YES  Headache (location?): YES  Sinus Pressure: YES  Conjunctivitis:  No  Ear Pain: YES: right  Rhinorrhea: No  Congestion: YES  Sore Throat: No  Cough: YES-productive of clear sputum, productive of yellow sputum  Wheeze: YES  Decreased Appetite: YES  Nausea: No  Vomiting: No  Diarrhea: No  Dysuria/Freq.: No  Dysuria or Hematuria: No  Fatigue/Achiness: YES  Sick/Strep Exposure: No  Therapies tried and outcome: Mucinex, ibuprofen, Advil    She comes in today for evaluation of upper respiratory symptoms.  She started with some mild chest congestion and head congestion 3 days ago.  This has progressed and she is now having a productive cough, increased shortness of breath and wheezing.  Denies any fevers.  She has been using Mucinex as well as her albuterol inhaler.  Approximate 3 weeks ago she stopped her chronic azithromycin as she ran out.  She did get this picked up and restarted recently.  She has done COVID testing which was negative.  Continues to use Trelegy on a routine basis.  She also has some discoloration to her shoulders.  She has used head and shoulders for this before which was helpful but hard to keep up with.  She is wondering if she can oral treatment.    Review of Systems   Constitutional:  Positive for fever.   Respiratory:  Positive for cough.             Objective    /82 (BP Location: Right arm, Patient Position: Sitting, Cuff Size: Adult Regular)   Pulse 90   Temp 98.4  F (36.9  C) (Temporal)   Resp 18   Ht 1.549 m (5' 1\")   Wt 71.5 kg (157 lb 9.6 oz)   SpO2 92%   BMI 29.78 kg/m    Body mass index is 29.78 kg/m .  Physical Exam "   GENERAL: healthy, alert and no distress  EYES: Eyes grossly normal to inspection, PERRL and conjunctivae and sclerae normal  HENT: ear canals and TM's normal, nose and mouth without ulcers or lesions  NECK: no adenopathy, no asymmetry, masses, or scars and thyroid normal to palpation  RESP: Wheezes noted throughout, congestion appreciated, frequent cough, O2 sats 92% on room air  SKIN: Patches of hypopigmentation to bilateral shoulders and upper chest  NEURO: Normal strength and tone, mentation intact and speech normal  PSYCH: mentation appears normal, affect normal/bright

## 2023-09-07 ENCOUNTER — OFFICE VISIT (OUTPATIENT)
Dept: FAMILY MEDICINE | Facility: OTHER | Age: 58
End: 2023-09-07
Attending: NURSE PRACTITIONER
Payer: COMMERCIAL

## 2023-09-07 VITALS
HEART RATE: 88 BPM | OXYGEN SATURATION: 95 % | SYSTOLIC BLOOD PRESSURE: 138 MMHG | TEMPERATURE: 98 F | BODY MASS INDEX: 30.85 KG/M2 | RESPIRATION RATE: 20 BRPM | HEIGHT: 61 IN | DIASTOLIC BLOOD PRESSURE: 82 MMHG | WEIGHT: 163.4 LBS

## 2023-09-07 DIAGNOSIS — J18.9 PNEUMONIA OF RIGHT LOWER LOBE DUE TO INFECTIOUS ORGANISM: ICD-10-CM

## 2023-09-07 DIAGNOSIS — F41.9 ANXIETY: Primary | ICD-10-CM

## 2023-09-07 DIAGNOSIS — J44.1 CHRONIC OBSTRUCTIVE PULMONARY DISEASE WITH ACUTE EXACERBATION (H): ICD-10-CM

## 2023-09-07 PROCEDURE — 99214 OFFICE O/P EST MOD 30 MIN: CPT | Performed by: NURSE PRACTITIONER

## 2023-09-07 RX ORDER — GABAPENTIN 100 MG/1
100 CAPSULE ORAL 2 TIMES DAILY
Qty: 60 CAPSULE | Refills: 0 | Status: SHIPPED | OUTPATIENT
Start: 2023-09-07 | End: 2023-09-22

## 2023-09-07 RX ORDER — CODEINE PHOSPHATE AND GUAIFENESIN 10; 100 MG/5ML; MG/5ML
1-2 SOLUTION ORAL EVERY 4 HOURS PRN
Qty: 473 ML | Refills: 1 | Status: SHIPPED | OUTPATIENT
Start: 2023-09-07

## 2023-09-07 ASSESSMENT — PAIN SCALES - GENERAL: PAINLEVEL: NO PAIN (0)
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
## 2023-09-18 DIAGNOSIS — J44.1 CHRONIC OBSTRUCTIVE PULMONARY DISEASE WITH ACUTE EXACERBATION (H): ICD-10-CM

## 2023-09-20 RX ORDER — ALBUTEROL SULFATE 90 UG/1
AEROSOL, METERED RESPIRATORY (INHALATION)
Qty: 25.5 G | Refills: 0 | Status: SHIPPED | OUTPATIENT
Start: 2023-09-20 | End: 2023-09-22

## 2023-09-20 NOTE — TELEPHONE ENCOUNTER
"[facility] sent Rx request for the following:      Requested Prescriptions   Pending Prescriptions Disp Refills    albuterol (PROAIR HFA/PROVENTIL HFA/VENTOLIN HFA) 108 (90 Base) MCG/ACT inhaler [Pharmacy Med Name: albuterol sulfate HFA 90 mcg/actuation aerosol inhaler] 25.5 g 0     Sig: INHALE 2 PUFFS INTO THE lungs EVERY 4 HOURS AS NEEDED       Asthma Maintenance Inhalers - Anticholinergics Failed - 9/18/2023 12:06 PM        Failed - Asthma control assessment score within normal limits in last 6 months     Please review ACT score.           Passed - Patient is age 12 years or older        Passed - Medication is active on med list        Passed - Recent (6 mo) or future (30 days) visit within the authorizing provider's specialty     Patient had office visit in the last 6 months or has a visit in the next 30 days with authorizing provider or within the authorizing provider's specialty.  See \"Patient Info\" tab in inbasket, or \"Choose Columns\" in Meds & Orders section of the refill encounter.           Short-Acting Beta Agonist Inhalers Protocol  Failed - 9/18/2023 12:06 PM        Failed - Asthma control assessment score within normal limits in last 6 months     Please review ACT score.           Passed - Patient is age 12 or older        Passed - Medication is active on med list        Passed - Recent (6 mo) or future (30 days) visit within the authorizing provider's specialty     Patient had office visit in the last 6 months or has a visit in the next 30 days with authorizing provider or within the authorizing provider's specialty.  See \"Patient Info\" tab in inbasket, or \"Choose Columns\" in Meds & Orders section of the refill encounter.                 Last Prescription Date:   9/8/22  Last Fill Qty/Refills:         25.5 g, R-0    Last Office Visit:              9/7/23   Future Office visit:           9/22/23    Elizabeth Ivey RN on 9/20/2023 at 3:07 PM         "

## 2023-09-22 ENCOUNTER — OFFICE VISIT (OUTPATIENT)
Dept: FAMILY MEDICINE | Facility: OTHER | Age: 58
End: 2023-09-22
Attending: NURSE PRACTITIONER
Payer: COMMERCIAL

## 2023-09-22 VITALS
TEMPERATURE: 98.2 F | HEIGHT: 61 IN | DIASTOLIC BLOOD PRESSURE: 80 MMHG | HEART RATE: 84 BPM | WEIGHT: 164 LBS | OXYGEN SATURATION: 94 % | RESPIRATION RATE: 20 BRPM | BODY MASS INDEX: 30.96 KG/M2 | SYSTOLIC BLOOD PRESSURE: 138 MMHG

## 2023-09-22 DIAGNOSIS — Z12.31 ENCOUNTER FOR SCREENING MAMMOGRAM FOR BREAST CANCER: ICD-10-CM

## 2023-09-22 DIAGNOSIS — Z00.00 ROUTINE GENERAL MEDICAL EXAMINATION AT A HEALTH CARE FACILITY: Primary | ICD-10-CM

## 2023-09-22 DIAGNOSIS — J44.9 COPD, SEVERE (H): ICD-10-CM

## 2023-09-22 DIAGNOSIS — F41.0 PANIC ATTACK: ICD-10-CM

## 2023-09-22 DIAGNOSIS — J43.1 PANLOBULAR EMPHYSEMA (H): ICD-10-CM

## 2023-09-22 DIAGNOSIS — Z13.220 LIPID SCREENING: ICD-10-CM

## 2023-09-22 DIAGNOSIS — Z87.891 PERSONAL HISTORY OF TOBACCO USE: ICD-10-CM

## 2023-09-22 DIAGNOSIS — F17.200 TOBACCO USE DISORDER: ICD-10-CM

## 2023-09-22 DIAGNOSIS — F41.9 ANXIETY: ICD-10-CM

## 2023-09-22 PROCEDURE — 99212 OFFICE O/P EST SF 10 MIN: CPT | Mod: 25 | Performed by: NURSE PRACTITIONER

## 2023-09-22 PROCEDURE — 99396 PREV VISIT EST AGE 40-64: CPT | Mod: 25 | Performed by: NURSE PRACTITIONER

## 2023-09-22 PROCEDURE — G0296 VISIT TO DETERM LDCT ELIG: HCPCS | Performed by: NURSE PRACTITIONER

## 2023-09-22 RX ORDER — GABAPENTIN 100 MG/1
CAPSULE ORAL
Qty: 90 CAPSULE | Refills: 1 | Status: SHIPPED | OUTPATIENT
Start: 2023-09-22

## 2023-09-22 RX ORDER — VENLAFAXINE HYDROCHLORIDE 150 MG/1
150 CAPSULE, EXTENDED RELEASE ORAL DAILY
Qty: 90 CAPSULE | Refills: 3 | Status: SHIPPED | OUTPATIENT
Start: 2023-09-22 | End: 2024-09-20

## 2023-09-22 RX ORDER — ALBUTEROL SULFATE 90 UG/1
2 AEROSOL, METERED RESPIRATORY (INHALATION) EVERY 4 HOURS PRN
Qty: 25.5 G | Refills: 11 | Status: SHIPPED | OUTPATIENT
Start: 2023-09-22

## 2023-09-22 ASSESSMENT — ENCOUNTER SYMPTOMS
FEVER: 0
JOINT SWELLING: 0
HEADACHES: 0
BREAST MASS: 0
HEMATURIA: 0
DYSURIA: 0
WEAKNESS: 0
CHILLS: 0
DIZZINESS: 0
PALPITATIONS: 0
ARTHRALGIAS: 0
FREQUENCY: 0
SORE THROAT: 0
EYE PAIN: 0
MYALGIAS: 0
HEARTBURN: 0
PARESTHESIAS: 0
NAUSEA: 0
SHORTNESS OF BREATH: 0
ABDOMINAL PAIN: 0
DIARRHEA: 0
HEMATOCHEZIA: 0
COUGH: 0
CONSTIPATION: 0
NERVOUS/ANXIOUS: 0

## 2023-09-22 NOTE — PATIENT INSTRUCTIONS
Lung Cancer Screening   Frequently Asked Questions  If you are at high-risk for lung cancer, getting screened with low-dose computed tomography (LDCT) every year can help save your life. This handout offers answers to some of the most common questions about lung cancer screening. If you have other questions, please call [phone] ([phone]).     What is it?  Lung cancer screening uses special X-ray technology to create an image of your lung tissue. The exam is quick and easy and takes less than 10 seconds. We don t give you any medicine or use any needles. You can eat before and after the exam. You don t need to change your clothes as long as the clothing on your chest doesn t contain metal. But, you do need to be able to hold your breath for at least 6 seconds during the exam.    What is the goal of lung cancer screening?  The goal of lung cancer screening is to save lives. Many times, lung cancer is not found until a person starts having physical symptoms. Lung cancer screening can help detect lung cancer in the earliest stages when it may be easier to treat.    Who should be screened for lung cancer?  We suggest lung cancer screening for anyone who is at high-risk for lung cancer. You are in the high-risk group if you:      are between the ages of 55 and 79, and    have smoked at least 1 pack of cigarettes a day for 20 or more years, and    still smoke or have quit within the past 15 years.    However, if you have a new cough or shortness of breath, you should talk to your doctor before being screened.    Why does it matter if I have symptoms?  Certain symptoms can be a sign that you have a condition in your lungs that should be checked and treated by your doctor. These symptoms include fever, chest pain, a new or changing cough, shortness of breath that you have never felt before, coughing up blood or unexplained weight loss. Having any of these symptoms can greatly affect the results of lung  cancer screening.       Should all smokers get an LDCT lung cancer screening exam?  It depends. Lung cancer screening is for a very specific group of men and women who have a history of heavy smoking over a long period of time (see  Who should be screened for lung cancer  above).  I am in the high-risk group, but have been diagnosed with cancer in the past. Is LDCT lung cancer screening right for me?  In some cases, you should not have LDCT lung screening, such as when your doctor is already following your cancer with CT scan studies. Your doctor will help you decide if LDCT lung screening is right for you.  Do I need to have a screening exam every year?  Yes. If you are in the high-risk group described earlier, you should get an LDCT lung cancer screening exam every year until you are 79, or are no longer willing or able to undergo screening and possible procedures to diagnose and treat lung cancer.  How effective is LDCT at preventing death from lung cancer?  Studies have shown that LDCT lung cancer screening can lower the risk of death from lung cancer by 20 percent in people who are at high-risk.  What are the risks?  There are some risks and limitations of LDCT lung cancer screening. We want to make sure you understand the risks and benefits, so please let us know if you have any questions. Your doctor may want to talk with you more about these risks.    Radiation exposure: As with any exam that uses radiation, there is a very small increased risk of cancer. The amount of radiation in LDCT is small--about the same amount a person would get from a mammogram. Your doctor orders the exam when he or she feels the potential benefits outweigh the risks.    False negatives: No test is perfect, including LDCT. It is possible that you may have a medical condition, including lung cancer, that is not found during your exam. This is called a false negative result.    False positives and more testing: LDCT very often finds  something in the lung that could be cancer, but in fact is not. This is called a false positive result. False positive tests often cause anxiety. To make sure these findings are not cancer, you may need to have more tests. These tests will be done only if you give us permission. Sometimes patients need a treatment that can have side effects, such as a biopsy. For more information on false positives, see  What can I expect from the results?     Findings not related to lung cancer: Your LDCT exam also takes pictures of areas of your body next to your lungs. In a very small number of cases, the CT scan will show an abnormal finding in one of these areas, such as your kidneys, adrenal glands, liver or thyroid. This finding may not be serious, but you may need more tests. Your doctor can help you decide what other tests you may need, if any.  What can I expect from the results?  About 1 out of 4 LDCT exams will find something that may need more tests. Most of the time, these findings are lung nodules. Lung nodules are very small collections of tissue in the lung. These nodules are very common, and the vast majority--more than 97 percent--are not cancer (benign). Most are normal lymph nodes or small areas of scarring from past infections.  But, if a small lung nodule is found to be cancer, the cancer can be cured more than 90 percent of the time. To know if the nodule is cancer, we may need to get more images before your next yearly screening exam. If the nodule has suspicious features (for example, it is large, has an odd shape or grows over time), we will refer you to a specialist for further testing.  Will my doctor also get the results?  Yes. Your doctor will get a copy of your results.  Is it okay to keep smoking now that there s a cancer screening exam?  No. Tobacco is one of the strongest cancer-causing agents. It causes not only lung cancer, but other cancers and cardiovascular (heart) diseases as well. The damage  caused by smoking builds over time. This means that the longer you smoke, the higher your risk of disease. While it is never too late to quit, the sooner you quit, the better.  Where can I find help to quit smoking?  The best way to prevent lung cancer is to stop smoking. If you have already quit smoking, congratulations and keep it up! For help on quitting smoking, please call Natanael Ulien at 3-966-QUITNOW ([phone]) or the American [employer] Society at 1-470.203.1347 to find local resources near you.  One-on-one health coaching:  If you d prefer to work individually with a health care provider on tobacco cessation, we offer:      Medication Therapy Management:  Our specially trained pharmacists work closely with you and your doctor to help you quit smoking.  Call [phone] or [phone] (toll free).    Preventive Health Recommendations  Female Ages 50 - 64    Yearly exam: See your health care provider every year in order to  Review health changes.   Discuss preventive care.    Review your medicines if your doctor has prescribed any.    Get a Pap test every three years (unless you have an abnormal result and your provider advises testing more often).  If you get Pap tests with HPV test, you only need to test every 5 years, unless you have an abnormal result.   You do not need a Pap test if your uterus was removed (hysterectomy) and you have not had cancer.  You should be tested each year for STDs (sexually transmitted diseases) if you're at risk.   Have a mammogram every 1 to 2 years.  Have a colonoscopy at age 50, or have a yearly FIT test (stool test). These exams screen for colon cancer.    Have a cholesterol test every 5 years, or more often if advised.  Have a diabetes test (fasting glucose) every three years. If you are at risk for diabetes, you should have this test more often.   If you are at risk for osteoporosis (brittle bone disease), think about having a bone density scan (DEXA).    Shots: Get a  flu shot each year. Get a tetanus shot every 10 years.    Nutrition:   Eat at least 5 servings of fruits and vegetables each day.  Eat whole-grain bread, whole-wheat pasta and brown rice instead of white grains and rice.  Get adequate Calcium and Vitamin D.     Lifestyle  Exercise at least 150 minutes a week (30 minutes a day, 5 days a week). This will help you control your weight and prevent disease.  Limit alcohol to one drink per day.  No smoking.   Wear sunscreen to prevent skin cancer.   See your dentist every six months for an exam and cleaning.  See your eye doctor every 1 to 2 years.

## 2023-09-22 NOTE — PROGRESS NOTES
SUBJECTIVE:   CC: Puja is an 58 year old who presents for preventive health visit.       Healthy Habits:     Getting at least 3 servings of Calcium per day:  NO    Bi-annual eye exam:  Yes    Dental care twice a year:  Yes    Sleep apnea or symptoms of sleep apnea:  Excessive snoring    Diet:  Carbohydrate counting    Frequency of exercise:  2-3 days/week    Duration of exercise:  15-30 minutes    Taking medications regularly:  Yes    Medication side effects:  None    Additional concerns today:  No      Today's PHQ-2 Score:       9/22/2023     3:18 PM   PHQ-2 ( 1999 [employer])   Q1: Little interest or pleasure in doing things 0   Q2: Feeling down, depressed or hopeless 0   PHQ-2 Score 0   Q1: Little interest or pleasure in doing things Not at all   Q2: Feeling down, depressed or hopeless Not at all   PHQ-2 Score 0       She comes in today for annual physical.  She was recently treated for COPD exacerbation with antibiotics, she is improving in her symptoms.  She has returned to taking azithromycin 3 times weekly, Trelegy daily and albuterol inhaler as needed.  She does continue to smoke approximately half a pack of cigarettes a day.  She does have Chantix and plans to start this when she starts improving in her symptoms.  We have discussed pulmonology referrals in the past, she will check with insurance to see coverage and let me know.    When I saw her last, she was having increased anxiety.  She is currently on venlafaxine 150 mg daily.  She has not tolerated increased dosages so we started her on gabapentin 100 mg twice daily.  She reports she is not having any side effects, anxiety is improving but would like to see further improvement.      Social History     Tobacco Use    Smoking status: Every Day     Packs/day: 0.50     Years: 25.00     Pack years: 12.50     Types: Cigarettes    Smokeless tobacco: Never   Substance Use Topics    Alcohol use: Yes     Comment: Alcoholic Drinks/day: occasional              2023     3:18 PM   Alcohol Use   Prescreen: >3 drinks/day or >7 drinks/week? No     Reviewed orders with patient.  Reviewed health maintenance and updated orders accordingly - Yes  BP Readings from Last 3 Encounters:   23 138/80   23 138/82   23 136/82    Wt Readings from Last 3 Encounters:   23 74.4 kg (164 lb)   23 74.1 kg (163 lb 6.4 oz)   23 71.5 kg (157 lb 9.6 oz)                  Patient Active Problem List   Diagnosis    Allergic rhinitis    Asthma    Panic attack    Tobacco use disorder    Tremor    Anxiety    Chronic obstructive pulmonary disease with acute exacerbation (H)    Panlobular emphysema (H)     Past Surgical History:   Procedure Laterality Date    APPENDECTOMY OPEN      at age 16 for acute appendicitis     SECTION      x2    COLONOSCOPY  2013,lian - reported NL    OTHER SURGICAL HISTORY      11/10,95025.0,[facility] LIGATE FALLOPIAN TUBE,uterine ablation, Dr. Kebede       Social History     Tobacco Use    Smoking status: Every Day     Packs/day: 0.50     Years: 25.00     Pack years: 12.50     Types: Cigarettes    Smokeless tobacco: Never   Substance Use Topics    Alcohol use: Yes     Comment: Alcoholic Drinks/day: occasional     Family History   Problem Relation Age of Onset    Colon Cancer Mother         Cancer-colon    Prostate Cancer Father         Cancer-prostate    Alzheimer Disease Father     Breast Cancer Maternal Aunt         Cancer-breast    Heart Disease Other         Heart Disease    Other - See Comments Other         COPD/Emphysema    Other - See Comments Other         Stroke         Current Outpatient Medications   Medication Sig Dispense Refill    albuterol (PROAIR HFA/PROVENTIL HFA/VENTOLIN HFA) 108 (90 Base) MCG/ACT inhaler Inhale 2 puffs into the lungs every 4 hours as needed for shortness of breath, wheezing or cough 25.5 g 11    azithromycin (ZITHROMAX) 250 MG tablet Take 1 tablet 3 times weekly 90 tablet 3     EPINEPHrine (ANY BX GENERIC EQUIV) 0.3 MG/0.3ML injection 2-pack Inject 0.3 mLs (0.3 mg) into the muscle once as needed for anaphylaxis For allergic reaction 2 each 1    Fluticasone-Umeclidin-Vilanterol (TRELEGY ELLIPTA) 200-62.5-25 MCG/ACT oral inhaler Inhale 1 puff into the lungs daily 60 each 11    gabapentin (NEURONTIN) 100 MG capsule Take 200 mg in morning and 100 mg in evening 90 capsule 1    guaiFENesin-codeine (ROBITUSSIN AC) 100-10 MG/5ML solution Take 5-10 mLs by mouth every 4 hours as needed for cough 473 mL 1    Spacer/Aero-Holding Chambers (AEROCHAMBER MV) MISC For home use. 1 each 0    varenicline (CHANTIX ARMANDO) 0.5 MG X 11 & 1 MG X 42 tablet Take 0.5 mg tab daily for 3 days, THEN 0.5 mg tab twice daily for 4 days, THEN 1 mg twice daily. 53 tablet 0    venlafaxine (EFFEXOR XR) 150 MG 24 hr capsule Take 1 capsule (150 mg) by mouth daily 90 capsule 3     Allergies   Allergen Reactions    Tree Nuts  [Nuts] Anaphylaxis    Metronidazole Other (See Comments)     Pt can't remember.     Tetracycline Other (See Comments)     Pt can't remember.        Breast Cancer Screening:    FHS-7:       9/22/2023     3:20 PM   Breast CA Risk Assessment (FHS-7)   Did any of your first-degree relatives have breast or ovarian cancer? Yes   Did any of your relatives have bilateral breast cancer? Unknown   Did any man in your family have breast cancer? No   Did any woman in your family have breast and ovarian cancer? No   Did any woman in your family have breast cancer before age 50 y? No   Do you have 2 or more relatives with breast and/or ovarian cancer? No   Do you have 2 or more relatives with breast and/or bowel cancer? No       Mammogram Screening: Recommended mammography every 1-2 years with patient discussion and risk factor consideration  Pertinent mammograms are reviewed under the imaging tab.    History of abnormal Pap smear: NO - age 30-65 PAP every 5 years with negative HPV co-testing recommended      Latest Ref Rng  & Units 2020     9:11 AM   PAP / HPV   PAP (Historical)  NIL    HPV 16 DNA NEG^Negative Negative    HPV 18 DNA NEG^Negative Negative    Other HR HPV NEG^Negative Positive      Reviewed and updated as needed this visit by clinical staff   Tobacco  Allergies  Meds  Problems  Med Hx  Surg Hx  Fam Hx          Reviewed and updated as needed this visit by Provider   Tobacco  Allergies  Meds  Problems  Med Hx  Surg Hx  Fam Hx         Past Medical History:   Diagnosis Date    Abnormal cytological findings in specimens from other organs, systems and tissues     HPV+ PAP -, normal paps ,  (no endo cells)    Epilepsy without status epilepticus, not intractable (H)     No Comments Provided    Gastritis without bleeding     06,treated     Other specified postprocedural states      (negative)    Tinea pedis     2010    Tobacco use     Attempts at cessation: electric cigarette, patches, chantix.      Past Surgical History:   Procedure Laterality Date    APPENDECTOMY OPEN      at age 16 for acute appendicitis     SECTION      x2    COLONOSCOPY  2013,lian - reported NL    OTHER SURGICAL HISTORY      11/10,64030.0,[place] LIGATE FALLOPIAN TUBE,uterine ablation, Dr. Kebede       Review of Systems   Constitutional:  Negative for chills and fever.   HENT:  Negative for congestion, ear pain, hearing loss and sore throat.    Eyes:  Negative for pain and visual disturbance.   Respiratory:  Negative for cough and shortness of breath.    Cardiovascular:  Negative for chest pain, palpitations and peripheral edema.   Gastrointestinal:  Negative for abdominal pain, constipation, diarrhea, heartburn, hematochezia and nausea.   Breasts:  Negative for tenderness, breast mass and discharge.   Genitourinary:  Negative for dysuria, frequency, genital sores, hematuria, pelvic pain, urgency, vaginal bleeding and vaginal discharge.   Musculoskeletal:  Negative for arthralgias, joint  "swelling and myalgias.   Skin:  Negative for rash.   Neurological:  Negative for dizziness, weakness, headaches and paresthesias.   Psychiatric/Behavioral:  Negative for mood changes. The patient is not nervous/anxious.           OBJECTIVE:   /80   Pulse 84   Temp 98.2  F (36.8  C)   Resp 20   Ht 1.549 m (5' 1\")   Wt 74.4 kg (164 lb)   SpO2 94%   BMI 30.99 kg/m    Physical Exam  GENERAL: healthy, alert and no distress  EYES: Eyes grossly normal to inspection, PERRL and conjunctivae and sclerae normal  HENT: ear canals and TM's normal, nose and mouth without ulcers or lesions  NECK: no adenopathy, no asymmetry, masses, or scars and thyroid normal to palpation  RESP: lungs clear to auscultation - no rales, rhonchi or wheezes.  O2 sats 94% on room air.  Frequent cough.  CV: regular rate and rhythm, normal S1 S2, no S3 or S4, no murmur, click or rub, no peripheral edema and peripheral pulses strong  ABDOMEN: soft, nontender, no hepatosplenomegaly, no masses and bowel sounds normal  MS: no gross musculoskeletal defects noted, no edema  SKIN: no suspicious lesions or rashes  NEURO: Normal strength and tone, mentation intact and speech normal  PSYCH: mentation appears normal, affect normal/bright    Diagnostic Test Results:  Labs reviewed in Epic    ASSESSMENT/PLAN:       ICD-10-CM    1. Routine general medical examination at a health care facility  Z00.00 Comprehensive Metabolic Panel     TSH Reflex GH      2. Anxiety  F41.9 gabapentin (NEURONTIN) 100 MG capsule      3. COPD, severe (H)  J44.9 Fluticasone-Umeclidin-Vilanterol (TRELEGY ELLIPTA) 200-62.5-25 MCG/ACT oral inhaler      4. Panic attack  F41.0 venlafaxine (EFFEXOR XR) 150 MG 24 hr capsule      5. Personal history of tobacco use  Z87.891 Prof fee: Shared Decision Making for Lung Cancer Screening     CT Chest Lung Cancer Scrn Low Dose wo      6. Encounter for screening mammogram for breast cancer  Z12.31 MA Screen Bilateral w/Akash      7. Lipid " "screening  Z13.220 Lipid Panel      8. Panlobular emphysema (H)  J43.1       9. Tobacco use disorder  F17.200         Mammogram ordered  Lung cancer screening ordered  Fasting labs ordered, she plans to schedule all these diagnostics at the same time as she is not fasting today  Refilled all medications x1 year  Increase gabapentin to 200 mg in the morning and 100 in the evening.  If this is working well, she will continue dosing.  In a couple weeks if she is tolerating medications well but feels like she could have improvement in symptoms, she will reach out via [employer] and we can increase this to 200 mg twice daily.  Encourage smoking cessation and starting of Chantix.  She will follow-up with her insurance company to determine coverage for pulmonology and let me know for appropriate referrals.  Declines immunizations at this time    Patient has been advised of split billing requirements and indicates understanding: Yes      COUNSELING:  Reviewed preventive health counseling, as reflected in patient instructions       Regular exercise       Healthy diet/nutrition       Vision screening       Consider lung cancer screening for ages 55-80 years (77 for Medicare) and 20 pack-year smoking history       BMI:   Estimated body mass index is 30.99 kg/m  as calculated from the following:    Height as of this encounter: 1.549 m (5' 1\").    Weight as of this encounter: 74.4 kg (164 lb).   Weight management plan: Discussed healthy diet and exercise guidelines      She reports that she has been smoking cigarettes. She has a 12.50 pack-year smoking history. She has never used smokeless tobacco.  Nicotine/Tobacco Cessation Plan:   She has BLACK Hoover CNP  [facility] Cancer Screening Shared Decision Making Visit     Puja Ocasio, a 58 year old female, is eligible for lung cancer screening    History   Smoking Status    Every Day    Packs/day: 0.50    Years: 25.00    Types: " Cigarettes   Smokeless Tobacco    Never       I have discussed with patient the risks and benefits of screening for lung cancer with low-dose CT.     The risks include:    radiation exposure: one low dose chest CT has as much ionizing radiation as about 15 chest x-rays, or 6 months of background radiation living in [place]      false positives: most findings/nodules are NOT cancer, but some might still require additional diagnostic evaluation, including biopsy    over-diagnosis: some slow growing cancers that might never have been clinically significant will be detected and treated unnecessarily     The benefit of early detection of lung cancer is contingent upon adherence to annual screening or more frequent follow up if indicated.     Furthermore, to benefit from screening, Puja must be willing and able to undergo diagnostic procedures, if indicated. Although no specific guide is available for determining severity of comorbidities, it is reasonable to withhold screening in patients who have greater mortality risk from other diseases.     We did discuss that the best way to prevent lung cancer is to not smoke.    Some patients may value a numeric estimation of lung cancer risk when evaluating if lung cancer screening is right for them, here is one calculator:    ShouldIScreen

## 2023-09-22 NOTE — NURSING NOTE
Patient presents today for physical.    Medication Reconciliation Complete    Adela Null LPN  9/22/2023 3:21 PM

## 2023-11-03 ENCOUNTER — HOSPITAL ENCOUNTER (OUTPATIENT)
Dept: CT IMAGING | Facility: OTHER | Age: 58
Discharge: HOME | End: 2023-11-03
Attending: NURSE PRACTITIONER | Admitting: NURSE PRACTITIONER
Payer: COMMERCIAL

## 2023-11-03 DIAGNOSIS — Z87.891 PERSONAL HISTORY OF TOBACCO USE: ICD-10-CM

## 2023-11-03 PROCEDURE — 71271 CT THORAX LUNG CANCER SCR C-: CPT

## 2023-11-06 ENCOUNTER — TELEPHONE (OUTPATIENT)
Dept: FAMILY MEDICINE | Facility: OTHER | Age: 58
End: 2023-11-06
Payer: COMMERCIAL

## 2023-11-06 NOTE — TELEPHONE ENCOUNTER
CT on 11/03/23 shows benign left adrenal nodule was seen.    Shoshana Santizo LPN............11/6/2023 3:50 PM

## 2023-11-06 NOTE — TELEPHONE ENCOUNTER
Please call for incidental findings on the CT from last Friday.      Jennifer Branch on 11/6/2023 at 3:23 PM

## 2023-11-06 NOTE — TELEPHONE ENCOUNTER
Benign nodule on adrenal gland noted.  We will plan to repeat imaging in 1 year per up-to-date recommendations. BLACK Car CNP on 11/6/2023 at 4:09 PM

## 2023-12-01 ENCOUNTER — HOSPITAL ENCOUNTER (OUTPATIENT)
Dept: MAMMOGRAPHY | Facility: OTHER | Age: 58
Discharge: HOME | End: 2023-12-01
Attending: NURSE PRACTITIONER | Admitting: NURSE PRACTITIONER
Payer: COMMERCIAL

## 2023-12-01 DIAGNOSIS — Z12.31 ENCOUNTER FOR SCREENING MAMMOGRAM FOR BREAST CANCER: ICD-10-CM

## 2023-12-01 PROCEDURE — 77067 SCR MAMMO BI INCL CAD: CPT

## 2024-09-18 DIAGNOSIS — J44.9 COPD, SEVERE (H): ICD-10-CM

## 2024-09-18 DIAGNOSIS — F41.0 PANIC ATTACK: ICD-10-CM

## 2024-09-20 RX ORDER — VENLAFAXINE HYDROCHLORIDE 150 MG/1
150 CAPSULE, EXTENDED RELEASE ORAL DAILY
Qty: 90 CAPSULE | Refills: 0 | Status: SHIPPED | OUTPATIENT
Start: 2024-09-20

## 2024-09-20 RX ORDER — FLUTICASONE FUROATE, UMECLIDINIUM BROMIDE AND VILANTEROL TRIFENATATE 200; 62.5; 25 UG/1; UG/1; UG/1
1 POWDER RESPIRATORY (INHALATION) DAILY
Qty: 60 EACH | Refills: 0 | Status: SHIPPED | OUTPATIENT
Start: 2024-09-20

## 2024-09-20 NOTE — TELEPHONE ENCOUNTER
[facility]  sent Rx request for the following:      Requested Prescriptions   Pending Prescriptions Disp Refills    TRELEGY ELLIPTA 200-62.5-25 MCG/ACT oral inhaler [Pharmacy Med Name: Trelegy Ellipta 200 mcg-62.5 mcg-25 mcg powder for inhalation]  11     Sig: Inhale 1 puff into the lungs daily       There is no refill protocol information for this order     Last Prescription Date:   09/22/2023  Last Fill Qty/Refills:         60, R-11      venlafaxine (EFFEXOR XR) 150 MG 24 hr capsule [Pharmacy Med Name: venlafaxine  mg capsule,extended release 24 hr] 90 capsule 11     Sig: Take 1 capsule (150 mg) by mouth daily       Serotonin-Norepinephrine Reuptake Inhibitors  Failed - 9/18/2024  7:10 AM        Failed - OLYA-7 score on file during last 12 months        Passed - Blood pressure under 140/90 in past 12 months     BP Readings from Last 3 Encounters:   09/22/23 138/80   09/07/23 138/82   08/31/23 136/82       No data recorded         Last Prescription Date:   09/22/2023  Last Fill Qty/Refills:         90, R-3  Last Office Visit:              09/22/2023   Future Office visit:           None    Pt is due for yearly exam. Routing to scheduling to please call and schedule patient for an appointment.    Jess Moreau RN on 9/20/2024 at 6:54 AM

## 2024-09-24 ENCOUNTER — TELEPHONE (OUTPATIENT)
Dept: FAMILY MEDICINE | Facility: OTHER | Age: 59
End: 2024-09-24
Payer: COMMERCIAL

## 2024-09-24 NOTE — TELEPHONE ENCOUNTER
Patient is now scheduled for yearly physical. Patient noted that she will likely run out of one of her medications before then and is curious about her options. Patient also noted that she would potentially like to get a some skin tags and moles removed.     Please call the patient with any further questions or concerns.     Alejandro Ernandez on 9/24/2024 at 8:58 AM

## 2024-09-28 DIAGNOSIS — J44.1 CHRONIC OBSTRUCTIVE PULMONARY DISEASE WITH ACUTE EXACERBATION (H): ICD-10-CM

## 2024-10-02 NOTE — TELEPHONE ENCOUNTER
GI sent Rx request for the following:      Requested Prescriptions   Pending Prescriptions Disp Refills    azithromycin (ZITHROMAX) 250 MG tablet [Pharmacy Med Name: azithromycin 250 mg tablet] 90 tablet 3     Sig: TAKE 1 TABLET BY MOUTH 3 TIMES WEEKLY       There is no refill protocol information for this order          Last Prescription Date:   8/31/23  Last Fill Qty/Refills:         90, R-3    Last Office Visit:              9/22/23   Future Office visit:             Next 5 appointments (look out 90 days)      Oct 08, 2024 3:40 PM  (Arrive by 3:25 PM)  Adult Preventative Visit with BLACK Altamirano [facility] ([facility] ) [address]  [place] [id number] [phone]           Routing refill request to provider for review/approval because:  Drug not on the [facility] refill protocol     Isa Woody RN on 10/2/2024 at 4:25 PM

## 2024-10-03 RX ORDER — AZITHROMYCIN 250 MG/1
TABLET, FILM COATED ORAL
Qty: 90 TABLET | Refills: 4 | Status: SHIPPED | OUTPATIENT
Start: 2024-10-03

## 2024-10-03 SDOH — HEALTH STABILITY: PHYSICAL HEALTH: ON AVERAGE, HOW MANY MINUTES DO YOU ENGAGE IN EXERCISE AT THIS LEVEL?: 20 MIN

## 2024-10-03 SDOH — HEALTH STABILITY: PHYSICAL HEALTH
ON AVERAGE, HOW MANY DAYS PER WEEK DO YOU ENGAGE IN MODERATE TO STRENUOUS EXERCISE (LIKE A BRISK WALK)?: PATIENT DECLINED

## 2024-10-03 ASSESSMENT — SOCIAL DETERMINANTS OF HEALTH (SDOH): HOW OFTEN DO YOU GET TOGETHER WITH FRIENDS OR RELATIVES?: TWICE A WEEK

## 2024-10-08 ENCOUNTER — OFFICE VISIT (OUTPATIENT)
Dept: FAMILY MEDICINE | Facility: OTHER | Age: 59
End: 2024-10-08
Attending: NURSE PRACTITIONER
Payer: COMMERCIAL

## 2024-10-08 VITALS
HEIGHT: 62 IN | OXYGEN SATURATION: 97 % | BODY MASS INDEX: 30.07 KG/M2 | TEMPERATURE: 98.5 F | DIASTOLIC BLOOD PRESSURE: 70 MMHG | HEART RATE: 89 BPM | WEIGHT: 163.4 LBS | RESPIRATION RATE: 18 BRPM | SYSTOLIC BLOOD PRESSURE: 132 MMHG

## 2024-10-08 DIAGNOSIS — Z00.00 ROUTINE GENERAL MEDICAL EXAMINATION AT A HEALTH CARE FACILITY: Primary | ICD-10-CM

## 2024-10-08 DIAGNOSIS — J44.9 COPD, SEVERE (H): ICD-10-CM

## 2024-10-08 DIAGNOSIS — Z87.891 PERSONAL HISTORY OF TOBACCO USE: ICD-10-CM

## 2024-10-08 DIAGNOSIS — Z12.31 ENCOUNTER FOR SCREENING MAMMOGRAM FOR BREAST CANCER: ICD-10-CM

## 2024-10-08 DIAGNOSIS — B36.0 TINEA VERSICOLOR: ICD-10-CM

## 2024-10-08 DIAGNOSIS — L98.9 SKIN LESION: ICD-10-CM

## 2024-10-08 DIAGNOSIS — F41.9 ANXIETY: ICD-10-CM

## 2024-10-08 PROCEDURE — 99214 OFFICE O/P EST MOD 30 MIN: CPT | Mod: 25 | Performed by: NURSE PRACTITIONER

## 2024-10-08 PROCEDURE — G0296 VISIT TO DETERM LDCT ELIG: HCPCS | Performed by: NURSE PRACTITIONER

## 2024-10-08 PROCEDURE — 99396 PREV VISIT EST AGE 40-64: CPT | Performed by: NURSE PRACTITIONER

## 2024-10-08 RX ORDER — FLUCONAZOLE 150 MG/1
TABLET ORAL
Qty: 4 TABLET | Refills: 0 | Status: SHIPPED | OUTPATIENT
Start: 2024-10-08

## 2024-10-08 RX ORDER — FLUTICASONE FUROATE, UMECLIDINIUM BROMIDE AND VILANTEROL TRIFENATATE 200; 62.5; 25 UG/1; UG/1; UG/1
1 POWDER RESPIRATORY (INHALATION) DAILY
Qty: 60 EACH | Refills: 11 | Status: SHIPPED | OUTPATIENT
Start: 2024-10-08

## 2024-10-08 RX ORDER — ALBUTEROL SULFATE 90 UG/1
2 INHALANT RESPIRATORY (INHALATION) EVERY 4 HOURS PRN
Qty: 25.5 G | Refills: 11 | Status: SHIPPED | OUTPATIENT
Start: 2024-10-08

## 2024-10-08 RX ORDER — CITALOPRAM HYDROBROMIDE 10 MG/1
10 TABLET ORAL DAILY
Qty: 30 TABLET | Refills: 0 | Status: SHIPPED | OUTPATIENT
Start: 2024-10-08

## 2024-10-08 RX ORDER — VENLAFAXINE HYDROCHLORIDE 75 MG/1
CAPSULE, EXTENDED RELEASE ORAL
Qty: 10 CAPSULE | Refills: 0 | Status: SHIPPED | OUTPATIENT
Start: 2024-10-08

## 2024-10-08 RX ORDER — CITALOPRAM HYDROBROMIDE 20 MG/1
20 TABLET ORAL DAILY
Qty: 90 TABLET | Refills: 4 | Status: SHIPPED | OUTPATIENT
Start: 2024-10-08

## 2024-10-08 RX ORDER — VENLAFAXINE HYDROCHLORIDE 37.5 MG/1
CAPSULE, EXTENDED RELEASE ORAL
Qty: 20 CAPSULE | Refills: 0 | Status: SHIPPED | OUTPATIENT
Start: 2024-10-08

## 2024-10-08 ASSESSMENT — ANXIETY QUESTIONNAIRES
IF YOU CHECKED OFF ANY PROBLEMS ON THIS QUESTIONNAIRE, HOW DIFFICULT HAVE THESE PROBLEMS MADE IT FOR YOU TO DO YOUR WORK, TAKE CARE OF THINGS AT HOME, OR GET ALONG WITH OTHER PEOPLE: SOMEWHAT DIFFICULT
8. IF YOU CHECKED OFF ANY PROBLEMS, HOW DIFFICULT HAVE THESE MADE IT FOR YOU TO DO YOUR WORK, TAKE CARE OF THINGS AT HOME, OR GET ALONG WITH OTHER PEOPLE?: SOMEWHAT DIFFICULT
GAD7 TOTAL SCORE: 16
3. WORRYING TOO MUCH ABOUT DIFFERENT THINGS: MORE THAN HALF THE DAYS
4. TROUBLE RELAXING: NEARLY EVERY DAY
2. NOT BEING ABLE TO STOP OR CONTROL WORRYING: NEARLY EVERY DAY
5. BEING SO RESTLESS THAT IT IS HARD TO SIT STILL: MORE THAN HALF THE DAYS
7. FEELING AFRAID AS IF SOMETHING AWFUL MIGHT HAPPEN: NOT AT ALL
GAD7 TOTAL SCORE: 16
7. FEELING AFRAID AS IF SOMETHING AWFUL MIGHT HAPPEN: NOT AT ALL
GAD7 TOTAL SCORE: 16
1. FEELING NERVOUS, ANXIOUS, OR ON EDGE: NEARLY EVERY DAY
6. BECOMING EASILY ANNOYED OR IRRITABLE: NEARLY EVERY DAY

## 2024-10-08 ASSESSMENT — PAIN SCALES - GENERAL: PAINLEVEL: NO PAIN (0)

## 2024-10-08 NOTE — PATIENT INSTRUCTIONS
Reduce Venlafaxine every 5-7 days, if you have symptoms you can slow down the process  Venlafaxine 75 mg + 37.5 mg (112.5 mg) then  Venlafaxine 75 mg then  Venlafaxine 37.5 mg then stop  Once you get to 75 mg daily okay to start citalopram 10 mg daily-once off venlafaxine then start citalopram 20 mg daily    Patient Education   Preventive Care Advice   This is general advice given by our system to help you stay healthy. However, your care team may have specific advice just for you. Please talk to your care team about your preventive care needs.  Nutrition  Eat 5 or more servings of fruits and vegetables each day.  Try wheat bread, brown rice and whole grain pasta (instead of white bread, rice, and pasta).  Get enough calcium and vitamin D. Check the label on foods and aim for 100% of the RDA (recommended daily allowance).  Lifestyle  Exercise at least 150 minutes each week  (30 minutes a day, 5 days a week).  Do muscle strengthening activities 2 days a week. These help control your weight and prevent disease.  No smoking.  Wear sunscreen to prevent skin cancer.  Have a dental exam and cleaning every 6 months.  Yearly exams  See your health care team every year to talk about:  Any changes in your health.  Any medicines your care team has prescribed.  Preventive care, family planning, and ways to prevent chronic diseases.  Shots (vaccines)   HPV shots (up to age 26), if you've never had them before.  Hepatitis B shots (up to age 59), if you've never had them before.  COVID-19 shot: Get this shot when it's due.  Flu shot: Get a flu shot every year.  Tetanus shot: Get a tetanus shot every 10 years.  Pneumococcal, hepatitis A, and RSV shots: Ask your care team if you need these based on your risk.  Shingles shot (for age 50 and up)  General health tests  Diabetes screening:  Starting at age 35, Get screened for diabetes at least every 3 years.  If you are younger than age 35, ask your care team if you should be screened  for diabetes.  Cholesterol test: At age 39, start having a cholesterol test every 5 years, or more often if advised.  Bone density scan (DEXA): At age 50, ask your care team if you should have this scan for osteoporosis (brittle bones).  Hepatitis C: Get tested at least once in your life.  STIs (sexually transmitted infections)  Before age 24: Ask your care team if you should be screened for STIs.  After age 24: Get screened for STIs if you're at risk. You are at risk for STIs (including HIV) if:  You are sexually active with more than one person.  You don't use condoms every time.  You or a partner was diagnosed with a sexually transmitted infection.  If you are at risk for HIV, ask about PrEP medicine to prevent HIV.  Get tested for HIV at least once in your life, whether you are at risk for HIV or not.  Cancer screening tests  Cervical cancer screening: If you have a cervix, begin getting regular cervical cancer screening tests starting at age 21.  Breast cancer scan (mammogram): If you've ever had breasts, begin having regular mammograms starting at age 40. This is a scan to check for breast cancer.  Colon cancer screening: It is important to start screening for colon cancer at age 45.  Have a colonoscopy test every 10 years (or more often if you're at risk) Or, ask your provider about stool tests like a FIT test every year or Cologuard test every 3 years.  To learn more about your testing options, visit:   .  For help making a decision, visit:   https://bit.ly/ne82280.  Prostate cancer screening test: If you have a prostate, ask your care team if a prostate cancer screening test (PSA) at age 55 is right for you.  Lung cancer screening: If you are a current or former smoker ages 50 to 80, ask your care team if ongoing lung cancer screenings are right for you.  For informational purposes only. Not to replace the advice of your health care provider. Copyright   2023 [employer]. All rights reserved.  Clinically reviewed by the [facility] Transitions Program. [facility] 263846 - REV 01/24.  Learning About Stress  What is stress?     Stress is your body's response to a hard situation. Your body can have a physical, emotional, or mental response. Stress is a fact of life for most people, and it affects everyone differently. What causes stress for you may not be stressful for someone else.  A lot of things can cause stress. You may feel stress when you go on a job interview, take a test, or run a race. This kind of short-term stress is normal and even useful. It can help you if you need to work hard or react quickly. For example, stress can help you finish an important job on time.  Long-term stress is caused by ongoing stressful situations or events. Examples of long-term stress include long-term health problems, ongoing problems at work, or conflicts in your family. Long-term stress can harm your health.  How does stress affect your health?  When you are stressed, your body responds as though you are in danger. It makes hormones that speed up your heart, make you breathe faster, and give you a burst of energy. This is called the fight-or-flight stress response. If the stress is over quickly, your body goes back to normal and no harm is done.  But if stress happens too often or lasts too long, it can have bad effects. Long-term stress can make you more likely to get sick, and it can make symptoms of some diseases worse. If you tense up when you are stressed, you may develop neck, shoulder, or low back pain. Stress is linked to high blood pressure and heart disease.  Stress also harms your emotional health. It can make you mederos, tense, or depressed. Your relationships may suffer, and you may not do well at work or school.  What can you do to manage stress?  You can try these things to help manage stress:   Do something active. Exercise or activity can help reduce stress. Walking is a great way to get  started. Even everyday activities such as housecleaning or yard work can help.  Try yoga or lucy chi. These techniques combine exercise and meditation. You may need some training at first to learn them.  Do something you enjoy. For example, listen to music or go to a movie. Practice your hobby or do volunteer work.  Meditate. This can help you relax, because you are not worrying about what happened before or what may happen in the future.  Do guided imagery. Imagine yourself in any setting that helps you feel calm. You can use online videos, books, or a teacher to guide you.  Do breathing exercises. For example:  From a standing position, bend forward from the waist with your knees slightly bent. Let your arms dangle close to the floor.  Breathe in slowly and deeply as you return to a standing position. Roll up slowly and lift your head last.  Hold your breath for just a few seconds in the standing position.  Breathe out slowly and bend forward from the waist.  Let your feelings out. Talk, laugh, cry, and express anger when you need to. Talking with supportive friends or family, a counselor, or a flynn leader about your feelings is a healthy way to relieve stress. Avoid discussing your feelings with people who make you feel worse.  Write. It may help to write about things that are bothering you. This helps you find out how much stress you feel and what is causing it. When you know this, you can find better ways to cope.  What can you do to prevent stress?  You might try some of these things to help prevent stress:  Manage your time. This helps you find time to do the things you want and need to do.  Get enough sleep. Your body recovers from the stresses of the day while you are sleeping.  Get support. Your family, friends, and community can make a difference in how you experience stress.  Limit your news feed. Avoid or limit time on social media or news that may make you feel stressed.  Do something active. Exercise  "or activity can help reduce stress. Walking is a great way to get started.  Where can you learn more?  Go to https://www.[employer].net/patiented  Enter N032 in the search box to learn more about \"Learning About Stress.\"  Current as of: October 24, 2023  Content Version: 14.2 2024 [facility] [employer].   Care instructions adapted under license by your healthcare professional. If you have questions about a medical condition or this instruction, always ask your healthcare professional. [employer] disclaims any warranty or liability for your use of this information.       Lung Cancer Screening   Frequently Asked Questions  If you are at high-risk for lung cancer, getting screened with low-dose computed tomography (LDCT) every year can help save your life. This handout offers answers to some of the most common questions about lung cancer screening. If you have other questions, please call [phone] ([phone]).     What is it?  Lung cancer screening uses special X-ray technology to create an image of your lung tissue. The exam is quick and easy and takes less than 10 seconds. We don t give you any medicine or use any needles. You can eat before and after the exam. You don t need to change your clothes as long as the clothing on your chest doesn t contain metal. But, you do need to be able to hold your breath for at least 6 seconds during the exam.    What is the goal of lung cancer screening?  The goal of lung cancer screening is to save lives. Many times, lung cancer is not found until a person starts having physical symptoms. Lung cancer screening can help detect lung cancer in the earliest stages when it may be easier to treat.    Who should be screened for lung cancer?  We suggest lung cancer screening for anyone who is at high-risk for lung cancer. You are in the high-risk group if you:     are between the ages of 55 and 79, and   have smoked at least 1 pack of cigarettes a day for " 20 or more years, and   still smoke or have quit within the past 15 years.    However, if you have a new cough or shortness of breath, you should talk to your doctor before being screened.    Why does it matter if I have symptoms?  Certain symptoms can be a sign that you have a condition in your lungs that should be checked and treated by your doctor. These symptoms include fever, chest pain, a new or changing cough, shortness of breath that you have never felt before, coughing up blood or unexplained weight loss. Having any of these symptoms can greatly affect the results of lung cancer screening.       Should all smokers get an LDCT lung cancer screening exam?  It depends. Lung cancer screening is for a very specific group of men and women who have a history of heavy smoking over a long period of time (see  Who should be screened for lung cancer  above).  I am in the high-risk group, but have been diagnosed with cancer in the past. Is LDCT lung cancer screening right for me?  In some cases, you should not have LDCT lung screening, such as when your doctor is already following your cancer with CT scan studies. Your doctor will help you decide if LDCT lung screening is right for you.  Do I need to have a screening exam every year?  Yes. If you are in the high-risk group described earlier, you should get an LDCT lung cancer screening exam every year until you are 79, or are no longer willing or able to undergo screening and possible procedures to diagnose and treat lung cancer.  How effective is LDCT at preventing death from lung cancer?  Studies have shown that LDCT lung cancer screening can lower the risk of death from lung cancer by 20 percent in people who are at high-risk.  What are the risks?  There are some risks and limitations of LDCT lung cancer screening. We want to make sure you understand the risks and benefits, so please let us know if you have any questions. Your doctor may want to talk with you more  about these risks.   Radiation exposure: As with any exam that uses radiation, there is a very small increased risk of cancer. The amount of radiation in LDCT is small--about the same amount a person would get from a mammogram. Your doctor orders the exam when he or she feels the potential benefits outweigh the risks.   False negatives: No test is perfect, including LDCT. It is possible that you may have a medical condition, including lung cancer, that is not found during your exam. This is called a false negative result.   False positives and more testing: LDCT very often finds something in the lung that could be cancer, but in fact is not. This is called a false positive result. False positive tests often cause anxiety. To make sure these findings are not cancer, you may need to have more tests. These tests will be done only if you give us permission. Sometimes patients need a treatment that can have side effects, such as a biopsy. For more information on false positives, see  What can I expect from the results?    Findings not related to lung cancer: Your LDCT exam also takes pictures of areas of your body next to your lungs. In a very small number of cases, the CT scan will show an abnormal finding in one of these areas, such as your kidneys, adrenal glands, liver or thyroid. This finding may not be serious, but you may need more tests. Your doctor can help you decide what other tests you may need, if any.  What can I expect from the results?  About 1 out of 4 LDCT exams will find something that may need more tests. Most of the time, these findings are lung nodules. Lung nodules are very small collections of tissue in the lung. These nodules are very common, and the vast majority--more than 97 percent--are not cancer (benign). Most are normal lymph nodes or small areas of scarring from past infections.  But, if a small lung nodule is found to be cancer, the cancer can be cured more than 90 percent of the time.  To know if the nodule is cancer, we may need to get more images before your next yearly screening exam. If the nodule has suspicious features (for example, it is large, has an odd shape or grows over time), we will refer you to a specialist for further testing.  Will my doctor also get the results?  Yes. Your doctor will get a copy of your results.  Is it okay to keep smoking now that there s a cancer screening exam?  No. Tobacco is one of the strongest cancer-causing agents. It causes not only lung cancer, but other cancers and cardiovascular (heart) diseases as well. The damage caused by smoking builds over time. This means that the longer you smoke, the higher your risk of disease. While it is never too late to quit, the sooner you quit, the better.  Where can I find help to quit smoking?  The best way to prevent lung cancer is to stop smoking. If you have already quit smoking, congratulations and keep it up! For help on quitting smoking, please call [employer] at 8-650-QUITNOW (1-301.541.7125) or the American Cancer Society at 1-523.598.5228 to find local resources near you.  One-on-one health coaching:  If you d prefer to work individually with a health care provider on tobacco cessation, we offer:     Medication Therapy Management:  Our specially trained pharmacists work closely with you and your doctor to help you quit smoking.  Call [phone] or [phone] (toll free).

## 2024-10-08 NOTE — PROGRESS NOTES
Preventive Care Visit  [facility]  BALCK Car CNP, Nurse Practitioner - Family  Oct 8, 2024  {Provider  Link to [facility] :471576}    {PROVIDER CHARTING PREFERENCE:522326}    Diana Luo is a 59 year old, presenting for the following:  No chief complaint on file.        10/8/2024     3:34 PM   Additional Questions   Roomed by Abeba Bradley LPN   Accompanied by self         10/8/2024     3:34 PM   Patient Reported Additional Medications   Patient reports taking the following new medications n/a        Health Care Directive  Patient does not have a Health Care Directive or Living Will: Discussed advance care planning with patient; information given to patient to review.    HPI  ***  {MA/LPN/RN Pre-Provider Visit Orders- hCG/UA/Strep (Optional):681470}  {SUPERLIST (Optional):776721}  {additonal problems for provider to add (Optional):372163}      10/3/2024   General Health   How would you rate your overall physical health? Excellent   Feel stress (tense, anxious, or unable to sleep) Very much      (!) STRESS CONCERN      10/3/2024   Nutrition   Three or more servings of calcium each day? Yes   Diet: Carbohydrate counting   How many servings of fruit and vegetables per day? (!) 0-1   How many sweetened beverages each day? 0-1            10/3/2024   Exercise   Days per week of moderate/strenous exercise Patient declined   Average minutes spent exercising at this level 20 min            10/3/2024   Social Factors   Frequency of gathering with friends or relatives Twice a week   Worry food won't last until get money to buy more No   Food not last or not have enough money for food? No   Do you have housing? (Housing is defined as stable permanent housing and does not include staying ouside in a car, in a tent, in an abandoned building, in an overnight shelter, or couch-surfing.) Yes   Are you worried about losing your housing? No   Lack of transportation? No   Unable to get  utilities (heat,electricity)? No            10/3/2024   Fall Risk   Fallen 2 or more times in the past year? No   Trouble with walking or balance? No             10/3/2024   Dental   Dentist two times every year? (!) NO            10/3/2024   TB Screening   Were you born outside of the [place]? No            Today's PHQ-2 Score:       10/8/2024     3:34 PM   PHQ-2 ( 1999 [employer])   Q1: Little interest or pleasure in doing things 0   Q2: Feeling down, depressed or hopeless 0   PHQ-2 Score 0   Q1: Little interest or pleasure in doing things Not at all   Q2: Feeling down, depressed or hopeless Not at all   PHQ-2 Score 0           10/3/2024   Substance Use   Alcohol more than 3/day or more than 7/wk No   Do you use any other substances recreationally? No        Social History     Tobacco Use    Smoking status: Every Day     Current packs/day: 0.50     Average packs/day: 0.5 packs/day for 25.0 years (12.5 ttl pk-yrs)     Types: Cigarettes    Smokeless tobacco: Never   Vaping Use    Vaping status: Never Used   Substance Use Topics    Alcohol use: Yes     Comment: Alcoholic Drinks/day: occasional    Drug use: No     {Provider  If there are gaps in the social history shown above, please follow the link to update and then refresh the note Link to Social and Substance History :264040}      12/1/2023   LAST FHS-7 RESULTS   1st degree relative breast or ovarian cancer No   2 or more relatives with breast AND/OR bowel cancer Yes        {If any of the questions to the FHS7 are answered yes, consider referral for genetic counseling.    Additional indications for genetic referral include personal history of breast or ovarian cancer, genetic mutation in 1st degree relative which increases risk of breast cancer including BRCA1, BRCA2, CHERI, PALB 2, TP53, CHEK2, PTEN, CDH1, STK11 (per ACS) and/or 1st degree relative with history of pancreatic or high-risk prostate cancer (per NCCN):209610}   {Mammogram Decision Support  "(Optional):209776}        10/3/2024   STI Screening   New sexual partner(s) since last STI/HIV test? No        History of abnormal Pap smear: { :827242}        Latest Ref Rng & Units 7/9/2020     9:11 AM   PAP / HPV   PAP (Historical)  NIL    HPV 16 DNA NEG^Negative Negative    HPV 18 DNA NEG^Negative Negative    Other HR HPV NEG^Negative Positive      ASCVD Risk   The 10-year ASCVD risk score (Christian MARSHALL, et al., 2019) is: 7.7%    Values used to calculate the score:      Age: 59 years      Sex: Female      Is Non- : No      Diabetic: No      Tobacco smoker: Yes      Systolic Blood Pressure: 132 mmHg      Is BP treated: No      HDL Cholesterol: 59 mg/dL      Total Cholesterol: 250 mg/dL    {Link to Fracture Risk Assessment Tool (Optional):583441}    {Provider  REQUIRED FOR AWV Use the storyboard to review patient history, after sections have been marked as reviewed, refresh note to capture documentation:622753}   Reviewed and updated as needed this visit by Provider                    {HISTORY OPTIONS (Optional):239138}    {ROS Picklists (Optional):617755}     Objective    Exam  /70 (BP Location: Right arm, Patient Position: Sitting)   Pulse 89   Temp 98.5  F (36.9  C) (Tympanic)   Resp 18   Ht 1.575 m (5' 2\")   Wt 74.1 kg (163 lb 6.4 oz)   SpO2 97%   BMI 29.89 kg/m     Estimated body mass index is 29.89 kg/m  as calculated from the following:    Height as of this encounter: 1.575 m (5' 2\").    Weight as of this encounter: 74.1 kg (163 lb 6.4 oz).    Physical Exam  {Exam Choices (Optional):791003}        Signed Electronically by: BLACK Car CNP  {Email feedback regarding this note to [email or web address]   :391050}  "

## 2024-10-08 NOTE — PROGRESS NOTES
Preventive Care Visit  [facility]  BLACK Car CNP, Nurse Practitioner - Family  Oct 8, 2024      Assessment & Plan   Problem List Items Addressed This Visit          Other    Anxiety    Relevant Medications    venlafaxine (EFFEXOR XR) 75 MG 24 hr capsule    venlafaxine (EFFEXOR XR) 37.5 MG 24 hr capsule    citalopram (CELEXA) 10 MG tablet    citalopram (CELEXA) 20 MG tablet     Other Visit Diagnoses       Routine general medical examination at a health care facility    -  Primary    Relevant Orders    Basic Metabolic Panel    Skin lesion        Relevant Orders    Adult Gen Surg  Referral    Tinea versicolor        Relevant Medications    fluconazole (DIFLUCAN) 150 MG tablet    Fluticasone-Umeclidin-Vilanterol (TRELEGY ELLIPTA) 200-62.5-25 MCG/ACT oral inhaler    albuterol (PROAIR HFA/PROVENTIL HFA/VENTOLIN HFA) 108 (90 Base) MCG/ACT inhaler    COPD, severe (H)        Relevant Medications    Fluticasone-Umeclidin-Vilanterol (TRELEGY ELLIPTA) 200-62.5-25 MCG/ACT oral inhaler    albuterol (PROAIR HFA/PROVENTIL HFA/VENTOLIN HFA) 108 (90 Base) MCG/ACT inhaler    Personal history of tobacco use        Relevant Orders    Prof fee: Shared Decision Making for Lung Cancer Screening (Completed)    CT Chest Lung Cancer Scrn Low Dose wo    Encounter for screening mammogram for breast cancer        Relevant Orders    MA Screen Bilateral w/Akash           Discussed anxiety in detail, she has not tolerated other adjuvant medications in the past.  She is interested in switching to an alternative treatment.  Directions and prescription provided to taper off of venlafaxine and start citalopram.  She will follow-up as needed  Skin lesion, referral to general surgery to discuss excision  Tinea versicolor, Diflucan ordered  COPD, this has been under really good control with Trelegy, albuterol and azithromycin.  Plan to continue current, refilled medications x 1 year.  Discussed smoking cessation,  "she is working on this.  Lung cancer screening ordered.  Mammogram ordered  Declines immunizations  She is due for labs, orders placed but she left prior to getting these completed.  I will send her a message to schedule these at her convenience.  Patient has been advised of split billing requirements and indicates understanding: Yes       Nicotine/Tobacco Cessation  She reports that she has been smoking cigarettes. She has a 12.5 pack-year smoking history. She has never used smokeless tobacco.  Nicotine/Tobacco Cessation Plan  Discussed, not ready for medications      BMI  Estimated body mass index is 29.89 kg/m  as calculated from the following:    Height as of this encounter: 1.575 m (5' 2\").    Weight as of this encounter: 74.1 kg (163 lb 6.4 oz).   Weight management plan: Discussed healthy diet and exercise guidelines    Counseling  Appropriate preventive services were addressed with this patient via screening, questionnaire, or discussion as appropriate for fall prevention, nutrition, physical activity, Tobacco-use cessation, social engagement, weight loss and cognition.  Checklist reviewing preventive services available has been given to the patient.  Reviewed patient's diet, addressing concerns and/or questions.   The patient was instructed to see the dentist every 6 months.         Return in about 53 weeks (around 10/14/2025) for Annual Wellness Visit.      Diana Luo is a 59 year old, presenting for the following:  Physical (Annual physical)        10/8/2024     3:34 PM   Additional Questions   Roomed by Abeba Brdaley LPN   Accompanied by self         10/8/2024     3:34 PM   Patient Reported Additional Medications   Patient reports taking the following new medications n/a        Health Care Directive  Patient does not have a Health Care Directive or Living Will: Discussed advance care planning with patient; information given to patient to review.    HPI    She presents to clinic today for " annual physical exam.  She does have questions that she would like to discuss as well including anxiety and skin lesions.  She has got skin lesions around her eyes, these are becoming more prevalent, getting larger and irritated at times.  She is wondering about having these surgically removed.  She does have the tinea versicolor on her chest, has done Diflucan in the past which was helpful, seems to be increasing some and wants to do the Diflucan again.  Breathing has been under good control, continues on azithromycin and Trelegy.  Continues to smoke, would like to quit smoking but has been trying to lose weight first.    She is having a lot of anxiety.  In the past we have tried BuSpar and gabapentin, he had side effects to these.  She did not tolerate increased dose of venlafaxine.  She is wondering about an alternate medication and is interested in citalopram.  Friend of hers has used this and has done well with it.          10/3/2024   General Health   How would you rate your overall physical health? Excellent   Feel stress (tense, anxious, or unable to sleep) Very much      (!) STRESS CONCERN      10/3/2024   Nutrition   Three or more servings of calcium each day? Yes   Diet: Carbohydrate counting   How many servings of fruit and vegetables per day? (!) 0-1   How many sweetened beverages each day? 0-1            10/3/2024   Exercise   Days per week of moderate/strenous exercise Patient declined   Average minutes spent exercising at this level 20 min            10/3/2024   Social Factors   Frequency of gathering with friends or relatives Twice a week   Worry food won't last until get money to buy more No   Food not last or not have enough money for food? No   Do you have housing? (Housing is defined as stable permanent housing and does not include staying ouside in a car, in a tent, in an abandoned building, in an overnight shelter, or couch-surfing.) Yes   Are you worried about losing your housing? No   Lack  of transportation? No   Unable to get utilities (heat,electricity)? No            10/3/2024   Fall Risk   Fallen 2 or more times in the past year? No   Trouble with walking or balance? No             10/3/2024   Dental   Dentist two times every year? (!) NO            10/3/2024   TB Screening   Were you born outside of the [place]? No            Today's PHQ-2 Score:       10/8/2024     3:34 PM   PHQ-2 ( 1999 [employer])   Q1: Little interest or pleasure in doing things 0   Q2: Feeling down, depressed or hopeless 0   PHQ-2 Score 0   Q1: Little interest or pleasure in doing things Not at all   Q2: Feeling down, depressed or hopeless Not at all   PHQ-2 Score 0           10/3/2024   Substance Use   Alcohol more than 3/day or more than 7/wk No   Do you use any other substances recreationally? No        Social History     Tobacco Use    Smoking status: Every Day     Current packs/day: 0.50     Average packs/day: 0.5 packs/day for 25.0 years (12.5 ttl pk-yrs)     Types: Cigarettes    Smokeless tobacco: Never   Vaping Use    Vaping status: Never Used   Substance Use Topics    Alcohol use: Yes     Comment: Alcoholic Drinks/day: occasional    Drug use: No           12/1/2023   LAST FHS-7 RESULTS   1st degree relative breast or ovarian cancer No   2 or more relatives with breast AND/OR bowel cancer Yes           Mammogram Screening - Mammogram every 1-2 years updated in Health Maintenance based on mutual decision making        10/3/2024   STI Screening   New sexual partner(s) since last STI/HIV test? No        History of abnormal Pap smear: No - age 30- 64 PAP with HPV every 5 years recommended        Latest Ref Rng & Units 7/9/2020     9:11 AM   PAP / HPV   PAP (Historical)  NIL    HPV 16 DNA NEG^Negative Negative    HPV 18 DNA NEG^Negative Negative    Other HR HPV NEG^Negative Positive      ASCVD Risk   The 10-year ASCVD risk score (Christian MARSHALL, et al., 2019) is: 7.7%    Values used to calculate the score:      Age: 59 years       Sex: Female      Is Non- : No      Diabetic: No      Tobacco smoker: Yes      Systolic Blood Pressure: 132 mmHg      Is BP treated: No      HDL Cholesterol: 59 mg/dL      Total Cholesterol: 250 mg/dL           Reviewed and updated as needed this visit by Provider                    Past Medical History:   Diagnosis Date    Abnormal cytological findings in specimens from other organs, systems and tissues     HPV+ PAP -, normal paps ,  (no endo cells)    Epilepsy without status epilepticus, not intractable (H)     No Comments Provided    Gastritis without bleeding     06,treated     Other specified postprocedural states      (negative)    Tinea pedis     2010    Tobacco use     Attempts at cessation: electric cigarette, patches, chantix.     Past Surgical History:   Procedure Laterality Date    APPENDECTOMY OPEN      at age 16 for acute appendicitis     SECTION      x2    COLONOSCOPY  2013,lian - reported NL    OTHER SURGICAL HISTORY      11/10,26539.0,[place] LIGATE FALLOPIAN TUBE,uterine ablation, Dr. Kebede     Patient Active Problem List   Diagnosis    Allergic rhinitis    Asthma    Panic attack    Tobacco use disorder    Tremor    Anxiety    Chronic obstructive pulmonary disease with acute exacerbation (H)    Panlobular emphysema (H)     Past Surgical History:   Procedure Laterality Date    APPENDECTOMY OPEN      at age 16 for acute appendicitis     SECTION      x2    COLONOSCOPY  2013,lian - reported NL    OTHER SURGICAL HISTORY      11/10,82182.0,[place] LIGATE FALLOPIAN TUBE,uterine ablation, Dr. Kebede       Social History     Tobacco Use    Smoking status: Every Day     Current packs/day: 0.50     Average packs/day: 0.5 packs/day for 25.0 years (12.5 ttl pk-yrs)     Types: Cigarettes    Smokeless tobacco: Never   Substance Use Topics    Alcohol use: Yes     Comment: Alcoholic Drinks/day: occasional      Family History   Problem Relation Age of Onset    Colon Cancer Mother         Cancer-colon    Prostate Cancer Father         Cancer-prostate    Alzheimer Disease Father     Breast Cancer Maternal Aunt         Cancer-breast    Heart Disease Other         Heart Disease    Other - See Comments Other         COPD/Emphysema    Other - See Comments Other         Stroke         Current Outpatient Medications   Medication Sig Dispense Refill    albuterol (PROAIR HFA/PROVENTIL HFA/VENTOLIN HFA) 108 (90 Base) MCG/ACT inhaler Inhale 2 puffs into the lungs every 4 hours as needed for shortness of breath, wheezing or cough. 25.5 g 11    azithromycin (ZITHROMAX) 250 MG tablet TAKE 1 TABLET BY MOUTH 3 TIMES WEEKLY 90 tablet 4    citalopram (CELEXA) 10 MG tablet Take 1 tablet (10 mg) by mouth daily. 30 tablet 0    citalopram (CELEXA) 20 MG tablet Take 1 tablet (20 mg) by mouth daily. Start once done with Venlafaxine 90 tablet 4    EPINEPHrine (ANY BX GENERIC EQUIV) 0.3 MG/0.3ML injection 2-pack Inject 0.3 mLs (0.3 mg) into the muscle once as needed for anaphylaxis For allergic reaction 2 each 1    fluconazole (DIFLUCAN) 150 MG tablet Take 300 mg (2 pills) once weekly x2 weeks 4 tablet 0    Fluticasone-Umeclidin-Vilanterol (TRELEGY ELLIPTA) 200-62.5-25 MCG/ACT oral inhaler Inhale 1 puff into the lungs daily. 60 each 11    guaiFENesin-codeine (ROBITUSSIN AC) 100-10 MG/5ML solution Take 5-10 mLs by mouth every 4 hours as needed for cough 473 mL 1    Spacer/Aero-Holding Chambers (AEROCHAMBER MV) MISC For home use. 1 each 0    varenicline (CHANTIX ARMANDO) 0.5 MG X 11 & 1 MG X 42 tablet Take 0.5 mg tab daily for 3 days, THEN 0.5 mg tab twice daily for 4 days, THEN 1 mg twice daily. 53 tablet 0    venlafaxine (EFFEXOR XR) 150 MG 24 hr capsule Take 1 capsule (150 mg) by mouth daily 90 capsule 0    venlafaxine (EFFEXOR XR) 37.5 MG 24 hr capsule Take along with 75 mg for 1 week. Then 75 mg for 1 week then 37.5 mg for one week then stop 20  "capsule 0    venlafaxine (EFFEXOR XR) 75 MG 24 hr capsule Take along with 37.5 mg for 1 week 10 capsule 0     Allergies   Allergen Reactions    Tree Nuts  [Nuts] Anaphylaxis    Metronidazole Other (See Comments)     Pt can't remember.     Tetracycline Other (See Comments)     Pt can't remember.             Objective    Exam  /70 (BP Location: Right arm, Patient Position: Sitting)   Pulse 89   Temp 98.5  F (36.9  C) (Tympanic)   Resp 18   Ht 1.575 m (5' 2\")   Wt 74.1 kg (163 lb 6.4 oz)   SpO2 97%   BMI 29.89 kg/m     Estimated body mass index is 29.89 kg/m  as calculated from the following:    Height as of this encounter: 1.575 m (5' 2\").    Weight as of this encounter: 74.1 kg (163 lb 6.4 oz).    Physical Exam  GENERAL: alert and no distress  EYES: Eyes grossly normal to inspection, PERRL and conjunctivae and sclerae normal  HENT: ear canals and TM's normal, nose and mouth without ulcers or lesions  NECK: no adenopathy, no asymmetry, masses, or scars  RESP: lungs clear to auscultation - no rales, rhonchi or wheezes. O2 sats 97% on RA  CV: regular rate and rhythm, normal S1 S2, no S3 or S4, no murmur, click or rub, no peripheral edema  ABDOMEN: soft, nontender, no hepatosplenomegaly, no masses and bowel sounds normal  MS: no gross musculoskeletal defects noted, no edema  SKIN: Multiple raised skin lesions, cysts and wounds around her eyes  NEURO: Normal strength and tone, mentation intact and speech normal  PSYCH: mentation appears normal, affect normal/bright        Signed Electronically by: BLACK Car CNP  Lung Cancer Screening Shared Decision Making Visit     Puja Ocasio, a 59 year old female, is eligible for lung cancer screening    History   Smoking Status    Every Day    Packs/day: 0.50    Years: 25.00    Types: Cigarettes   Smokeless Tobacco    Never       I have discussed with patient the risks and benefits of screening for lung cancer with low-dose CT.     The risks " include:    radiation exposure: one low dose chest CT has as much ionizing radiation as about 15 chest x-rays, or 6 months of background radiation living in [place]      false positives: most findings/nodules are NOT cancer, but some might still require additional diagnostic evaluation, including biopsy    over-diagnosis: some slow growing cancers that might never have been clinically significant will be detected and treated unnecessarily     The benefit of early detection of lung cancer is contingent upon adherence to annual screening or more frequent follow up if indicated.     Furthermore, to benefit from screening, Puja must be willing and able to undergo diagnostic procedures, if indicated. Although no specific guide is available for determining severity of comorbidities, it is reasonable to withhold screening in patients who have greater mortality risk from other diseases.     We did discuss that the best way to prevent lung cancer is to not smoke.    Some patients may value a numeric estimation of lung cancer risk when evaluating if lung cancer screening is right for them, here is one calculator:    ShouldIScreen

## 2024-10-08 NOTE — NURSING NOTE
"No chief complaint on file.      Initial /70 (BP Location: Right arm, Patient Position: Sitting)   Pulse 89   Temp 98.5  F (36.9  C) (Tympanic)   Resp 18   Ht 1.575 m (5' 2\")   Wt 74.1 kg (163 lb 6.4 oz)   SpO2 97%   BMI 29.89 kg/m   Estimated body mass index is 29.89 kg/m  as calculated from the following:    Height as of this encounter: 1.575 m (5' 2\").    Weight as of this encounter: 74.1 kg (163 lb 6.4 oz).  Medication Review: complete    The next two questions are to help us understand your food security.  If you are feeling you need any assistance in this area, we have resources available to support you today.          10/3/2024   SDOH- Food Insecurity   Within the past 12 months, did you worry that your food would run out before you got money to buy more? N   Within the past 12 months, did the food you bought just not last and you didn t have money to get more? N            Health Care Directive:  Patient does not have a Health Care Directive or Living Will: Discussed advance care planning with patient; information given to patient to review.    Abeba Bradley LPN      "

## 2024-12-03 ENCOUNTER — HOSPITAL ENCOUNTER (OUTPATIENT)
Dept: CT IMAGING | Facility: OTHER | Age: 59
Discharge: HOME | End: 2024-12-03
Attending: NURSE PRACTITIONER
Payer: COMMERCIAL

## 2024-12-03 ENCOUNTER — VIRTUAL VISIT (OUTPATIENT)
Dept: PSYCHIATRY | Facility: OTHER | Age: 59
End: 2024-12-03
Attending: NURSE PRACTITIONER
Payer: COMMERCIAL

## 2024-12-03 ENCOUNTER — OFFICE VISIT (OUTPATIENT)
Dept: SURGERY | Facility: OTHER | Age: 59
End: 2024-12-03
Attending: SURGERY
Payer: COMMERCIAL

## 2024-12-03 ENCOUNTER — HOSPITAL ENCOUNTER (OUTPATIENT)
Dept: MAMMOGRAPHY | Facility: OTHER | Age: 59
Discharge: HOME | End: 2024-12-03
Attending: NURSE PRACTITIONER
Payer: COMMERCIAL

## 2024-12-03 VITALS
RESPIRATION RATE: 20 BRPM | DIASTOLIC BLOOD PRESSURE: 78 MMHG | BODY MASS INDEX: 30.14 KG/M2 | SYSTOLIC BLOOD PRESSURE: 148 MMHG | OXYGEN SATURATION: 93 % | HEART RATE: 72 BPM | WEIGHT: 164.8 LBS | TEMPERATURE: 97.4 F

## 2024-12-03 DIAGNOSIS — Z87.891 PERSONAL HISTORY OF TOBACCO USE: ICD-10-CM

## 2024-12-03 DIAGNOSIS — F41.1 GENERALIZED ANXIETY DISORDER: Primary | ICD-10-CM

## 2024-12-03 DIAGNOSIS — Z12.31 ENCOUNTER FOR SCREENING MAMMOGRAM FOR BREAST CANCER: ICD-10-CM

## 2024-12-03 DIAGNOSIS — L98.9 SKIN LESION: Primary | ICD-10-CM

## 2024-12-03 PROCEDURE — 77063 BREAST TOMOSYNTHESIS BI: CPT

## 2024-12-03 PROCEDURE — 71271 CT THORAX LUNG CANCER SCR C-: CPT

## 2024-12-03 RX ORDER — DESVENLAFAXINE 25 MG/1
25 TABLET, EXTENDED RELEASE ORAL DAILY
Qty: 30 TABLET | Refills: 0 | Status: SHIPPED | OUTPATIENT
Start: 2024-12-03

## 2024-12-03 ASSESSMENT — PAIN SCALES - GENERAL: PAINLEVEL_OUTOF10: NO PAIN (0)

## 2024-12-03 ASSESSMENT — ANXIETY QUESTIONNAIRES
1. FEELING NERVOUS, ANXIOUS, OR ON EDGE: NEARLY EVERY DAY
GAD7 TOTAL SCORE: 20
3. WORRYING TOO MUCH ABOUT DIFFERENT THINGS: NEARLY EVERY DAY
GAD7 TOTAL SCORE: 20
2. NOT BEING ABLE TO STOP OR CONTROL WORRYING: NEARLY EVERY DAY
7. FEELING AFRAID AS IF SOMETHING AWFUL MIGHT HAPPEN: MORE THAN HALF THE DAYS
8. IF YOU CHECKED OFF ANY PROBLEMS, HOW DIFFICULT HAVE THESE MADE IT FOR YOU TO DO YOUR WORK, TAKE CARE OF THINGS AT HOME, OR GET ALONG WITH OTHER PEOPLE?: EXTREMELY DIFFICULT
6. BECOMING EASILY ANNOYED OR IRRITABLE: NEARLY EVERY DAY
5. BEING SO RESTLESS THAT IT IS HARD TO SIT STILL: NEARLY EVERY DAY
IF YOU CHECKED OFF ANY PROBLEMS ON THIS QUESTIONNAIRE, HOW DIFFICULT HAVE THESE PROBLEMS MADE IT FOR YOU TO DO YOUR WORK, TAKE CARE OF THINGS AT HOME, OR GET ALONG WITH OTHER PEOPLE: EXTREMELY DIFFICULT
GAD7 TOTAL SCORE: 20
4. TROUBLE RELAXING: NEARLY EVERY DAY
7. FEELING AFRAID AS IF SOMETHING AWFUL MIGHT HAPPEN: MORE THAN HALF THE DAYS

## 2024-12-03 ASSESSMENT — PATIENT HEALTH QUESTIONNAIRE - PHQ9
SUM OF ALL RESPONSES TO PHQ QUESTIONS 1-9: 7
10. IF YOU CHECKED OFF ANY PROBLEMS, HOW DIFFICULT HAVE THESE PROBLEMS MADE IT FOR YOU TO DO YOUR WORK, TAKE CARE OF THINGS AT HOME, OR GET ALONG WITH OTHER PEOPLE: VERY DIFFICULT
SUM OF ALL RESPONSES TO PHQ QUESTIONS 1-9: 7

## 2024-12-03 NOTE — NURSING NOTE
"Chief Complaint   Patient presents with    Anxiety     Patient presents to their virtual visit today for concerns regarding anxiety and medication. Patient requested a link to join visit via email.    Initial There were no vitals taken for this visit. Estimated body mass index is 30.14 kg/m  as calculated from the following:    Height as of 10/8/24: 1.575 m (5' 2\").    Weight as of an earlier encounter on 12/3/24: 74.8 kg (164 lb 12.8 oz).  Medication Review: complete    The next two questions are to help us understand your food security.  If you are feeling you need any assistance in this area, we have resources available to support you today.          10/3/2024   SDOH- Food Insecurity   Within the past 12 months, did you worry that your food would run out before you got money to buy more? N    Within the past 12 months, did the food you bought just not last and you didn t have money to get more? N        Patient-reported         Health Care Directive:  Patient does not have a Health Care Directive: Discussed advance care planning with patient; however, patient declined at this time.    Martha Plata      "

## 2024-12-03 NOTE — PATIENT INSTRUCTIONS
"Deepika, it was a pleasure seeing you today. As we discussed, we will make the following changes:    Direct switch from Citalopram to Pristiq (Desvenlafaxine). So stop Citalopram and begin Pristiq 25mg. You prefer to take at bed, which is fine, but if it keeps you awake, please switch to morning.         Call the clinic with medication questions or concerns at [phone] or send a HipLink message. HipLink may be used to communicate with your provider, but this is not intended to be used for emergencies.     Crisis Resources for [facility]: First Call for Help: (211), [facility] Crisis Line available 24/7: ([phone])  [facility]: National Crisis Text Line: text HOME to 359408    Crisis Resources for [place]: Adult Mental Health crisis: [phone], [place] Crisis Text Line: text \"[place]\" or \"HOME\" to 115942    Therapy Options in [place], [place] and Surrounding Area:    Garrett Terry, [facility] - ([phone])    Psychological Services - Arik Landers ([phone])    Mouna Andrade, [facility], family and individual therapy- ([phone])    [facility] - specializes in women and adolescent therapy - ([phone])    [facility] - EMDR, trauma, etc. ([phone])    [facility] - spiritual based support groups ([phone])    Troy Ochoa - adults, adolescents and children ([phone])    [facility] - several different therapy options adults and children ([phone])    [facility] - several options, one of the largest mental health providers in the area - ([phone])    [facility] - ([phone])    [facility] - offers several therapy options including 8-week \"express\" therapy program - ([phone])    [facility] - ([phone])    [facility] - adult therapy ([phone])    [facility] - ([phone])    [facility] " - ([phone])    [facility] - ([phone])    [facility] - ([phone])    [facility], [place] - ([phone])    [facility], [place] - [phone]    [facility], [place] - [phone]    [facility], [place] - [phone]

## 2024-12-03 NOTE — NURSING NOTE
"Chief Complaint   Patient presents with    Procedure     Skin lesions on face and under right breast         Medication reconciliation completed.    FOOD SECURITY SCREENING QUESTIONS:    The next two questions are to help us understand your food security.  If you are feeling you need any assistance in this area, we have resources available to support you today.    Hunger Vital Signs:  Within the past 12 months we worried whether our food would run out before we got money to buy more. Never  Within the past 12 months the food we bought just didn't last and we didn't have money to get more. Never    Initial BP (!) 148/80 (BP Location: Right arm, Patient Position: Sitting, Cuff Size: Adult Regular)   Pulse 72   Temp 97.4  F (36.3  C) (Temporal)   Resp 20   Wt 74.8 kg (164 lb 12.8 oz)   SpO2 93%   BMI 30.14 kg/m   Estimated body mass index is 30.14 kg/m  as calculated from the following:    Height as of 10/8/24: 1.575 m (5' 2\").    Weight as of this encounter: 74.8 kg (164 lb 12.8 oz).       Kelly Schaeffer LPN .......  12/3/2024  9:31 AM  TIMEOUT    Universal Protocol    A. Pre-procedure verification complete   1-relevant information / documentation available, reviewed and properly matched to the patient; 2-consent accurate and complete, 3-equipment and supplies available    B. Site marking complete   Site marked if not in continuous attendance with patient    C. TIME OUT completed   Time Out was conducted just prior to starting procedure to verify the eight required elements: 1-patient identity, 2-consent accurate and complete, 3-position, 4-correct side/site marked (if applicable), 5-procedure, 6-relevant images / results properly labeled and displayed (if applicable), 7-antibiotics / irrigation fluids (if applicable), 8-safety precautions.    Surgical Nurse  ....................  12/3/2024   9:49 AM   "

## 2024-12-03 NOTE — PATIENT INSTRUCTIONS
Instructions:      Your incision was closed with stitches that will dissolve/need to be removed.     It is ok to remove the dressing and get the incision wet in the shower on the day after your procedure. Pat dry the area. Do not rub. Don't soak in a tub, pool or lake for 7 days.     Pathology can take up to 7-10 days to be completed. Once pathology is reviewed by the provider we will give you a call, letter, or MyChart message with the results. You may see the results prior to the provider reviewing them through your MyChart.       Monitor for any signs of infection:     redness in the area of the wound, particularly if it spreads or forms a red streak  swelling or warmth in the affected area  pain or tenderness at or around the site of the wound  pus forming around or oozing from the wound  fever  delayed wound healing        Please call the General Surgery office and ask for a nurse in unit 4S with problems, questions, or concerns at [phone].    General Surgery Nurses  Carmenza العراقي LPN

## 2024-12-03 NOTE — PROGRESS NOTES
Procedure Note  Pre/Post Operative Diagnosis:   Skin lesions ( all under 0.5 cm with margins) around eyes    Procedure:    Excision x 3, tangential excision x 1    Surgeon: Sergey Piña MD FACS    Local Anesthesia: 1% lidocaine with0.25%Marcaine with epinephrine    Indication for the procedure:    This is a 59 year old female patient referred by Kayley George  with multiple skin lesions around eyes. On right is raised cystic-looking lesion. On nose there is raised pearly lesion and below that a small pigmented lesion. Lateral left eye has skin tag raised lesion. Also, on inferior left eye is pigmented lesion present and unchanged since birth.    After explaining the risks to include bleeding, infection, recurrence or need for re-excision,and scarring the patient wished to proceed.    Procedure:   The areas were prepped and draped in usual sterile fashion with Betadine  . After, adequate local anesthesia,an elliptical skin incision was made to encompass three lesions.  The skin was closed with 5-0 Fast-absorbing gut. The last lesion was tangentially excised and cauterized.    Plan:   Patient will followup if there any problems with the wound including redness or drainage.

## 2024-12-03 NOTE — PROGRESS NOTES
[facility] PSYCHIATRY   HISTORY AND PHYSICAL     VIRTUAL VISIT     Telemedicine Visit: The patient's condition can be safely assessed and treated via synchronous audio and visual telemedicine encounter.      Reason for Telemedicine Visit: Patient has requested telehealth visit    Originating Site (Patient Location): Patient's home    Distant Location (provider location):  Off-site    Consent:  The patient/guardian has verbally consented to: the potential risks and benefits of telemedicine (video visit) versus in person care; bill my insurance or make self-payment for services provided; and responsibility for payment of non-covered services.     Mode of Communication:  Video Conference via [employer]    Start Time: 1300  End Time: 1402       APPOINTMENT DATA     Puja Ocasio  Pronouns: MRN# [id number]   Age: 59 year old YOB: 1965     Source of Referral:   Primary Physician: Kayley George        ASSESSMENT & PLAN     This is a 59 year old female who presents to [facility] psychiatric care and medication management for the following:       Diagnosis Comments   1. Generalized anxiety disorder  desvenlafaxine (PRISTIQ) 25 MG 24 hr tablet       9/20/2020    10:27 AM 10/8/2024     3:22 PM 12/3/2024     8:07 AM   OLYA-7 SCORE   Total Score  16 (severe anxiety) 20 (severe anxiety)   Total Score 12 16 20        Patient-reported               Presented requesting medication management services for increasing anxiety. On Effexor for the past 25 years. Last year she began having some increased anxiety related to daughter being ill and felt like medication stopped working. This year, even though daughter has recovered, she continued to feel that way. Six weeks ago her primary care provider switched her to Celexa and titrated her up to 20mg. She does not feel this has been helpful at all, reporting increased anxiety and brain fog. 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 Previously did very well on the Effexor.     Reported symptoms of brain fog, trouble concentrating, difficulty organizing thoughts and planning things, feeling easily overwhelmed, frequent worry, restlessness and feeling like she always has to be doing things. Sleep has not been effected and she sleeps very well. She does not have panic attacks, but indicated she was started on Effexor initially for panic attacks. No identifiable trigger but they questioned social anxiety at the time secondary to increased symptoms of irritability and feeling overwhelmed with extra activity going on around her and when around a lot of people. Reviewed some symptoms of ADHD with her, including difficulty waiting turn in conversations, difficulty paying attention to what the other person is saying because she is caught up in her own thoughts related to what she is going to say next, often forgetting to eat when busy doing other things, as well as symptoms of muscle tension and neck pain. She endorsed all of these symptoms and later mentioned that both of her children have been diagnosed with ADHD. She has never pursued diagnosis as symptoms were very well managed on Effexor, so she assumed it was all anxiety related. Given she responded well to Effexor, often used as a non-stimulant for ADHD, it is possibly this is why Celexa is not helping. Agreed to try Pristiq to see if she responds better to this.     Medication:   Pristiq 25mg daily (stated she is sensitive to medications and prefers to start at lowest dose)  Stop Citalopram as we are doing a direct switch.      Psychotherapy: None  Labs: No labs today  - stated she is having a number of labs drawn today  F/U: 2 weeks    The indications, risks, benefits, side effects, contraindications, possible interactions, and alternatives  have been discussed and are understood by the patient. The patient understands the risks of using street drugs or alcohol. The patient  "understands to call [phone] ([facility] Crisis Line) or come to the nearest ED if life threatening or urgent symptoms present.        HISTORY OF PRESENT ILLNESS     Initial Onset: 25 years ago, spontaneous panic attacks  New Onset: About a year ago Effexor no longer seemed effective. Notable increase in symptoms since 10/8 when Celexa was started  Trigger: No identifiable trigger; probable that medication benefits no longer as significant after prolonged use.  Severity: Severe anxiety  Duration: 1-1.5 years after 25 years of remission  Modifying factors: None identified. Has not tried therapy. Gabapentin tried and also caused brain fog.  Treatment compliance: Good  Treatment response:  Subtherapeutic and poorly tolerated  Complicating/contributing factors to consider: Possible ADHD; Pack per day smoker for 30 years; History of seizure activity in childhood, treated with medications until the pregnancy with her first child. Has not had a seizure since, is on no medications, and was told she \"grew out of it.\"    Protective Factors: Family, significant other, employment,      PSYCHIATRIC HISTORY     Past medication trials and treatments include   Effexor (25 years), Gabapentin, Citalopram (6 weeks)     Currently in counseling: No  Past hospitalizations: Denied  Trauma: Denied  Self-injurious behavior: The patient has no history of SIB .   Suicide attempts: Denied         REVIEW OF SYSTEMS              Review Of Systems    Skin: recent surgical removal of lesions on face  Eyes: negative  Ears/Nose/Throat: negative  Respiratory: COPD and asthma  Cardiovascular: negative  Gastrointestinal: negative  Genitourinary: negative  Musculoskeletal: negative  Neurologic: negative  Psychiatric: as noted above  Hematologic/Lymphatic/Immunologic: negative  Endocrine: negative       MEDICATIONS   Current Outpatient Medications   Medication Sig Dispense Refill    albuterol (PROAIR HFA/PROVENTIL HFA/VENTOLIN HFA) 108 (90 Base) " "MCG/ACT inhaler Inhale 2 puffs into the lungs every 4 hours as needed for shortness of breath, wheezing or cough. 25.5 g 11    azithromycin (ZITHROMAX) 250 MG tablet TAKE 1 TABLET BY MOUTH 3 TIMES WEEKLY 90 tablet 4    citalopram (CELEXA) 20 MG tablet Take 1 tablet (20 mg) by mouth daily. Start once done with Venlafaxine 90 tablet 4    EPINEPHrine (ANY BX GENERIC EQUIV) 0.3 MG/0.3ML injection 2-pack Inject 0.3 mLs (0.3 mg) into the muscle once as needed for anaphylaxis For allergic reaction 2 each 1    Fluticasone-Umeclidin-Vilanterol (TRELEGY ELLIPTA) 200-62.5-25 MCG/ACT oral inhaler Inhale 1 puff into the lungs daily. 60 each 11    Spacer/Aero-Holding Chambers (AEROCHAMBER MV) MISC For home use. 1 each 0    citalopram (CELEXA) 10 MG tablet Take 1 tablet (10 mg) by mouth daily. (Patient not taking: Reported on 12/3/2024) 30 tablet 0    varenicline (CHANTIX ARMANDO) 0.5 MG X 11 & 1 MG X 42 tablet Take 0.5 mg tab daily for 3 days, THEN 0.5 mg tab twice daily for 4 days, THEN 1 mg twice daily. (Patient not taking: Reported on 12/3/2024) 53 tablet 0     No current facility-administered medications for this visit.       Allergies   Allergen Reactions    Tree Nuts  [Nuts] Anaphylaxis    Metronidazole Other (See Comments)     Pt can't remember.     Tetracycline Other (See Comments)     Pt can't remember.         SUBSTANCE USE HISTORY   Denied         SOCIAL HISTORY   Resides with significant other of 17 years. Has two adult children, a son and daughter, and four grandchildren. Employed as a  for [employer] for the last 8 years. No college. No legal issues. No  history. No specific spiritual or cultural affiliations but \"believes in God.\"     FAMILY HISTORY   The patient reports a family history of ADHD in son, daughter, one grandchild and sister. Anxiety.      PAST MEDICAL HISTORY   Past Medical History:   Diagnosis Date    Abnormal cytological findings in specimens from other organs, systems and tissues  "    HPV+ PAP -2008, normal paps 2009, 2010 (no endo cells)    Epilepsy without status epilepticus, not intractable (H)     No Comments Provided    Gastritis without bleeding     05/02/06,treated 05/06    Other specified postprocedural states     2008 (negative)    Tinea pedis     8/6/2010    Tobacco use     Attempts at cessation: electric cigarette, patches, chantix.             Allergies   Allergen Reactions    Tree Nuts  [Nuts] Anaphylaxis    Metronidazole Other (See Comments)     Pt can't remember.     Tetracycline Other (See Comments)     Pt can't remember.          MENTAL STATUS EXAM   Vitals: There were no vitals taken for this visit.    Appearance:  awake, alert and adequately groomed  Attitude:  cooperative  Eye Contact:  good  Mood:  anxious  Affect:  mood congruent  Speech:  clear, coherent  Psychomotor Behavior:  no evidence of tardive dyskinesia, dystonia, or tics  Thought Process:  logical, linear, and goal oriented  Associations:  no loose associations  Thought Content:  no evidence of suicidal ideation or homicidal ideation and no evidence of psychotic thought  Insight:  good  Judgment:  intact  Oriented to:  time, person, and place  Attention Span and Concentration:  intact  Recent and Remote Memory:  intact  Language: Able to name objects and Able to repeat phrases  Fund of Knowledge: appropriate      Suicide Risk Assessment:  Today Puja Dwyerkeyana denied. In addition, she has notable risk factors for self-harm, including anxiety. However, risk is mitigated by commitment to family, sobriety, absence of past attempts, and history of seeking help when needed. Therefore, based on all available evidence including the factors cited above, she does not appear to be at imminent risk for self-harm, does not meet criteria for a 72-hr hold, and therefore remains appropriate for ongoing outpatient level of care. No referrals.       ATTESTATION    Areas addressed: Generalized Anxiety disorder, Chronic,  unstable; r/o ADHD  Medication management  No independent Historian  No outside data ordered or reviewed on this day  No social determinates of health impacting provider's ability to diagnose or treat.  Moderate risk of complications, morbidity, and/or mortality of patient management decision made at visit, associated with patient's problem, diagnoses, procedures and treatments.    BLACK Aguilera, CNP, PFMHNP    As the provider I attest to compliance with applicable laws and regulations related to telemedicine.     The longitudinal plan of care for the diagnosis(es)/condition(s) as documented were addressed during this visit. Due to the added complexity in care, I will continue to support Deepika in the subsequent management and with ongoing continuity of care.     66 minutes spent on the date of the encounter doing chart review, history and exam, documentation and further activities per the note.

## 2024-12-05 ENCOUNTER — OFFICE VISIT (OUTPATIENT)
Dept: FAMILY MEDICINE | Facility: OTHER | Age: 59
End: 2024-12-05
Attending: FAMILY MEDICINE
Payer: COMMERCIAL

## 2024-12-05 VITALS
HEART RATE: 72 BPM | DIASTOLIC BLOOD PRESSURE: 68 MMHG | SYSTOLIC BLOOD PRESSURE: 124 MMHG | WEIGHT: 162.4 LBS | OXYGEN SATURATION: 93 % | TEMPERATURE: 97.6 F | BODY MASS INDEX: 29.7 KG/M2 | RESPIRATION RATE: 20 BRPM

## 2024-12-05 DIAGNOSIS — M54.6 ACUTE LEFT-SIDED THORACIC BACK PAIN: Primary | ICD-10-CM

## 2024-12-05 DIAGNOSIS — F17.200 TOBACCO USE DISORDER: ICD-10-CM

## 2024-12-05 DIAGNOSIS — J43.1 PANLOBULAR EMPHYSEMA (H): ICD-10-CM

## 2024-12-05 DIAGNOSIS — R91.8 PULMONARY NODULES: Primary | ICD-10-CM

## 2024-12-05 RX ORDER — CYCLOBENZAPRINE HCL 10 MG
10 TABLET ORAL 3 TIMES DAILY PRN
Qty: 30 TABLET | Refills: 3 | Status: SHIPPED | OUTPATIENT
Start: 2024-12-05

## 2024-12-05 ASSESSMENT — ENCOUNTER SYMPTOMS: BACK PAIN: 1

## 2024-12-05 ASSESSMENT — PAIN SCALES - GENERAL: PAINLEVEL_OUTOF10: SEVERE PAIN (6)

## 2024-12-05 NOTE — PROGRESS NOTES
SUBJECTIVE:   Puja Ocasio is a 59 year old female who presents to clinic today for the following health issues: Left upper back pain  Patient arrives here for left upper back pain.  This is day #4.  States it hurts with some movement.  No shortness of breath.  Came on fairly suddenly.  She denies any injury no shortness of breath no fevers no chills no leg swelling.  2 days ago she did undergo a chest CT scanning for screening.  She is taken over-the-counter medications without much relief.  Back Pain           Patient Active Problem List    Diagnosis Date Noted    Panlobular emphysema (H) 09/22/2023     Priority: Medium    Tremor 09/13/2022     Priority: Medium    Anxiety 09/13/2022     Priority: Medium    Chronic obstructive pulmonary disease with acute exacerbation (H) 09/13/2022     Priority: Medium    Allergic rhinitis 02/02/2018     Priority: Medium     Overview:   possible with sinus congestion      Panic attack 02/02/2018     Priority: Medium    Asthma 06/11/2015     Priority: Medium    Tobacco use disorder 01/10/2011     Priority: Medium     Past Medical History:   Diagnosis Date    Abnormal cytological findings in specimens from other organs, systems and tissues     HPV+ PAP -2008, normal paps 2009, 2010 (no endo cells)    Epilepsy without status epilepticus, not intractable (H)     No Comments Provided    Gastritis without bleeding     05/02/06,treated 05/06    Other specified postprocedural states     2008 (negative)    Tinea pedis     8/6/2010    Tobacco use     Attempts at cessation: electric cigarette, patches, chantix.        Review of Systems   Musculoskeletal:  Positive for back pain.        OBJECTIVE:     /68   Pulse 72   Temp 97.6  F (36.4  C)   Resp 20   Wt 73.7 kg (162 lb 6.4 oz)   SpO2 93%   BMI 29.70 kg/m    Body mass index is 29.7 kg/m .  Physical Exam  Pulmonary:      Effort: Pulmonary effort is normal. No respiratory distress.      Breath sounds: Normal breath sounds.  No stridor. No wheezing.   Musculoskeletal:      Comments: Pain is reproducible left upper chest.  No masses palpated.         Diagnostic Test Results:  none     ASSESSMENT/PLAN:         (M54.6) Acute left-sided thoracic back pain  (primary encounter diagnosis)  Comment: Exam history consistent with muscular skeletal.  Possibly pleurisy.  Start Flexeril.  Plan: cyclobenzaprine (FLEXERIL) 10 MG tablet        Follow-up if getting out PE.  Currently no rash so doubt shingles.  Follow-up if getting worse      Jorje Durham MD  [facility]

## 2024-12-05 NOTE — NURSING NOTE
Patient here for left upper back pain that started Monday and has been getting worse. Medication Reconciliation: complete.    Ingrid Moreau LPN  12/5/2024 11:10 AM

## 2024-12-14 ASSESSMENT — ANXIETY QUESTIONNAIRES
7. FEELING AFRAID AS IF SOMETHING AWFUL MIGHT HAPPEN: MORE THAN HALF THE DAYS
7. FEELING AFRAID AS IF SOMETHING AWFUL MIGHT HAPPEN: MORE THAN HALF THE DAYS
GAD7 TOTAL SCORE: 20
GAD7 TOTAL SCORE: 20
3. WORRYING TOO MUCH ABOUT DIFFERENT THINGS: NEARLY EVERY DAY
6. BECOMING EASILY ANNOYED OR IRRITABLE: NEARLY EVERY DAY
4. TROUBLE RELAXING: NEARLY EVERY DAY
2. NOT BEING ABLE TO STOP OR CONTROL WORRYING: NEARLY EVERY DAY
1. FEELING NERVOUS, ANXIOUS, OR ON EDGE: NEARLY EVERY DAY
5. BEING SO RESTLESS THAT IT IS HARD TO SIT STILL: NEARLY EVERY DAY
IF YOU CHECKED OFF ANY PROBLEMS ON THIS QUESTIONNAIRE, HOW DIFFICULT HAVE THESE PROBLEMS MADE IT FOR YOU TO DO YOUR WORK, TAKE CARE OF THINGS AT HOME, OR GET ALONG WITH OTHER PEOPLE: EXTREMELY DIFFICULT
8. IF YOU CHECKED OFF ANY PROBLEMS, HOW DIFFICULT HAVE THESE MADE IT FOR YOU TO DO YOUR WORK, TAKE CARE OF THINGS AT HOME, OR GET ALONG WITH OTHER PEOPLE?: EXTREMELY DIFFICULT
GAD7 TOTAL SCORE: 20

## 2024-12-14 ASSESSMENT — PATIENT HEALTH QUESTIONNAIRE - PHQ9
SUM OF ALL RESPONSES TO PHQ QUESTIONS 1-9: 13
10. IF YOU CHECKED OFF ANY PROBLEMS, HOW DIFFICULT HAVE THESE PROBLEMS MADE IT FOR YOU TO DO YOUR WORK, TAKE CARE OF THINGS AT HOME, OR GET ALONG WITH OTHER PEOPLE: VERY DIFFICULT
SUM OF ALL RESPONSES TO PHQ QUESTIONS 1-9: 13

## 2024-12-17 ENCOUNTER — VIRTUAL VISIT (OUTPATIENT)
Dept: PSYCHIATRY | Facility: OTHER | Age: 59
End: 2024-12-17
Attending: NURSE PRACTITIONER

## 2024-12-17 DIAGNOSIS — F41.1 GENERALIZED ANXIETY DISORDER: ICD-10-CM

## 2024-12-17 RX ORDER — DESVENLAFAXINE 50 MG/1
50 TABLET, FILM COATED, EXTENDED RELEASE ORAL DAILY
Qty: 30 TABLET | Refills: 1 | Status: SHIPPED | OUTPATIENT
Start: 2024-12-17

## 2024-12-17 NOTE — NURSING NOTE
"Chief Complaint   Patient presents with    Recheck Medication     Patient presents to virtual appointment today for medication management. Patient has joined visit via [employer] link.    Initial There were no vitals taken for this visit. Estimated body mass index is 29.7 kg/m  as calculated from the following:    Height as of 10/8/24: 1.575 m (5' 2\").    Weight as of 12/5/24: 73.7 kg (162 lb 6.4 oz).  Medication Review: complete    The next two questions are to help us understand your food security.  If you are feeling you need any assistance in this area, we have resources available to support you today.          10/3/2024   SDOH- Food Insecurity   Within the past 12 months, did you worry that your food would run out before you got money to buy more? N    Within the past 12 months, did the food you bought just not last and you didn t have money to get more? N        Patient-reported         Health Care Directive:  Patient does not have a Health Care Directive: Discussed advance care planning with patient; however, patient declined at this time.    Martha Plata      "

## 2024-12-17 NOTE — PROGRESS NOTES
[facility] PSYCHIATRY   HISTORY AND PHYSICAL     VIRTUAL VISIT     Telemedicine Visit: The patient's condition can be safely assessed and treated via synchronous audio and visual telemedicine encounter.      Reason for Telemedicine Visit: Patient has requested telehealth visit    Originating Site (Patient Location): Patient's home    Distant Location (provider location):  Off-site    Consent:  The patient/guardian has verbally consented to: the potential risks and benefits of telemedicine (video visit) versus in person care; bill my insurance or make self-payment for services provided; and responsibility for payment of non-covered services.     Mode of Communication:  Video Conference via Act[employer]    Start Time: 1556  End Time: 1612       APPOINTMENT DATA     Puja Ocasio  Pronouns: MRN# [id number]   Age: 59 year old YOB: 1965     Source of Referral:   Primary Physician: Kayley George        ASSESSMENT & PLAN     This is a 59 year old female who presents for ongoing psychiatric care and medication management for the following:       Diagnosis Comments   1. Generalized anxiety disorder  desvenlafaxine (PRISTIQ) 25 MG 24 hr tablet       10/8/2024     3:22 PM 12/3/2024     8:07 AM 12/14/2024     5:54 PM   OLYA-7 SCORE   Total Score 16 (severe anxiety) 20 (severe anxiety) 20 (severe anxiety)   Total Score 16 20  20        Patient-reported         7/9/2020     8:42 AM 12/3/2024     8:10 AM 12/14/2024     5:57 PM   PHQ   PHQ-9 Total Score 0 7  13    Q9: Thoughts of better off dead/self-harm past 2 weeks Not at all Not at all  Not at all        Patient-reported               Reporting that she has noted some improvement in symptoms since starting new medication. Specifically notes that the brain fog has lifted. That symptom may have been secondary to, or exacerbated by, Celexa. Also feels calmer but still has quite a bit of anxiety. Is taking at bed. Some fatigue during  the day but also cut back on caffeine and switched to half-caf coffee, so is unsure if it's that or the medication. No significant side effects noted. Did ask about sexual side effects. Libido unchanged but has not been great for awhile. Asked if there was something she could take. Given this is a chronic issue, encouraged her to discuss further with OBGYN. We did discuss potential supplements available; however, none are FDA approved and most studies are limited.     Ongoing symptoms of trouble concentrating, difficulty organizing thoughts and planning things, feeling easily overwhelmed, frequent worry, restlessness and feeling like she always has to be doing things. Sleep remains good.  Ongoing issues with difficulty waiting turn in conversations, difficulty paying attention to what the other person is saying because she is caught up in her own thoughts related to what she is going to say next, often forgetting to eat when busy doing other things, as well as symptoms of muscle tension and neck pain.     Medication:   Increase Pristiq to 50mg daily        Psychotherapy: None  Labs: No labs today  - stated she is having a number of labs drawn today  F/U: 4 weeks    The indications, risks, benefits, side effects, contraindications, possible interactions, and alternatives  have been discussed and are understood by the patient. The patient understands the risks of using street drugs or alcohol. The patient understands to call 911, 211 ([facility] Crisis Line) or come to the nearest ED if life threatening or urgent symptoms present.        HISTORY OF PRESENT ILLNESS   Initial visit via video on 12/3/24. At that time:  Presented requesting medication management services for increasing anxiety. On Effexor for the past 25 years. Last year she began having some increased anxiety related to daughter being ill and felt like medication stopped working. This year, even though daughter has recovered, she continued to feel  that way. Six weeks ago her primary care provider switched her to Celexa and titrated her up to 20mg. She does not feel this has been helpful at all, reporting increased anxiety and brain fog. 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 Previously did very well on the Effexor.     Reported symptoms of brain fog, trouble concentrating, difficulty organizing thoughts and planning things, feeling easily overwhelmed, frequent worry, restlessness and feeling like she always has to be doing things. Sleep has not been effected and she sleeps very well. She does not have panic attacks, but indicated she was started on Effexor initially for panic attacks. No identifiable trigger but they questioned social anxiety at the time secondary to increased symptoms of irritability and feeling overwhelmed with extra activity going on around her and when around a lot of people. Reviewed some symptoms of ADHD with her, including difficulty waiting turn in conversations, difficulty paying attention to what the other person is saying because she is caught up in her own thoughts related to what she is going to say next, often forgetting to eat when busy doing other things, as well as symptoms of muscle tension and neck pain. She endorsed all of these symptoms and later mentioned that both of her children have been diagnosed with ADHD. She has never pursued diagnosis as symptoms were very well managed on Effexor, so she assumed it was all anxiety related. Given she responded well to Effexor, often used as a non-stimulant for ADHD, it is possibly this is why Celexa is not helping. Agreed to try Pristiq to see if she responds better to this.     Initial Onset: 25 years ago, spontaneous panic attacks  New Onset: About a year ago Effexor no longer seemed effective. Notable increase in symptoms since 10/8 when Celexa was started  Trigger: No identifiable trigger; probable that medication benefits no longer as significant  "after prolonged use.  Severity: Severe anxiety  Duration: 1-1.5 years after 25 years of remission  Modifying factors: None identified. Has not tried therapy. Gabapentin tried and also caused brain fog.  Treatment compliance: Good  Treatment response:  Subtherapeutic and poorly tolerated  Complicating/contributing factors to consider: Possible ADHD; Pack per day smoker for 30 years; History of seizure activity in childhood, treated with medications until the pregnancy with her first child. Has not had a seizure since, is on no medications, and was told she \"grew out of it.\"    Protective Factors: Family, significant other, employment,      [facility] Update   Psychiatric:   Past medication trials and treatments include   Effexor (25 years), Gabapentin, Citalopram (6 weeks)     Currently in counseling: No  Past hospitalizations: Denied  Trauma: Denied  Self-injurious behavior: The patient has no history of SIB .   Suicide attempts: Denied    Social:  Resides with significant other of 17 years. Has two adult children, a son and daughter, and four grandchildren. Employed as a  for [employer] for the last 8 years. No college. No legal issues. No  history. No specific spiritual or cultural affiliations but \"believes in God.\"    Chemical Use:  Denied    Family:  The patient reports a family history of ADHD in son, daughter, one grandchild and sister. Anxiety.               REVIEW OF SYSTEMS              Review Of Systems    Skin: recent surgical removal of lesions on face  Eyes: negative  Ears/Nose/Throat: negative  Respiratory: COPD and asthma  Cardiovascular: negative  Gastrointestinal: negative  Genitourinary: negative  Musculoskeletal: negative  Neurologic: negative  Psychiatric: as noted above  Hematologic/Lymphatic/Immunologic: negative  Endocrine: negative       MEDICATIONS   Current Outpatient Medications   Medication Sig Dispense Refill    albuterol (PROAIR HFA/PROVENTIL HFA/VENTOLIN HFA) 108 (90 " Base) MCG/ACT inhaler Inhale 2 puffs into the lungs every 4 hours as needed for shortness of breath, wheezing or cough. 25.5 g 11    azithromycin (ZITHROMAX) 250 MG tablet TAKE 1 TABLET BY MOUTH 3 TIMES WEEKLY 90 tablet 4    cyclobenzaprine (FLEXERIL) 10 MG tablet Take 1 tablet (10 mg) by mouth 3 times daily as needed for muscle spasms. 30 tablet 3    desvenlafaxine (PRISTIQ) 25 MG 24 hr tablet Take 1 tablet (25 mg) by mouth daily. 30 tablet 0    EPINEPHrine (ANY BX GENERIC EQUIV) 0.3 MG/0.3ML injection 2-pack Inject 0.3 mLs (0.3 mg) into the muscle once as needed for anaphylaxis For allergic reaction 2 each 1    Fluticasone-Umeclidin-Vilanterol (TRELEGY ELLIPTA) 200-62.5-25 MCG/ACT oral inhaler Inhale 1 puff into the lungs daily. 60 each 11    varenicline (CHANTIX ARMANDO) 0.5 MG X 11 & 1 MG X 42 tablet Take 0.5 mg tab daily for 3 days, THEN 0.5 mg tab twice daily for 4 days, THEN 1 mg twice daily. 53 tablet 0     No current facility-administered medications for this visit.       Allergies   Allergen Reactions    Tree Nuts  [Nuts] Anaphylaxis    Metronidazole Other (See Comments)     Pt can't remember.     Tetracycline Other (See Comments)     Pt can't remember.             PAST MEDICAL HISTORY   Past Medical History:   Diagnosis Date    Abnormal cytological findings in specimens from other organs, systems and tissues     HPV+ PAP -2008, normal paps 2009, 2010 (no endo cells)    Epilepsy without status epilepticus, not intractable (H)     No Comments Provided    Gastritis without bleeding     05/02/06,treated 05/06    Other specified postprocedural states     2008 (negative)    Tinea pedis     8/6/2010    Tobacco use     Attempts at cessation: electric cigarette, patches, chantix.             Allergies   Allergen Reactions    Tree Nuts  [Nuts] Anaphylaxis    Metronidazole Other (See Comments)     Pt can't remember.     Tetracycline Other (See Comments)     Pt can't remember.          MENTAL STATUS EXAM   Vitals: There  were no vitals taken for this visit.    Appearance:  awake, alert and adequately groomed  Attitude:  cooperative  Eye Contact:  good  Mood:  anxious but improving  Affect:  mood congruent  Speech:  clear, coherent  Psychomotor Behavior:  no evidence of tardive dyskinesia, dystonia, or tics  Thought Process:  logical, linear, and goal oriented  Associations:  no loose associations  Thought Content:  no evidence of suicidal ideation or homicidal ideation and no evidence of psychotic thought  Insight:  good  Judgment:  intact  Oriented to:  time, person, and place  Attention Span and Concentration:  intact  Recent and Remote Memory:  intact  Language: Able to name objects and Able to repeat phrases  Fund of Knowledge: appropriate      Suicide Risk Assessment:  Today Puja Ocasio denied. In addition, she has notable risk factors for self-harm, including anxiety. However, risk is mitigated by commitment to family, sobriety, absence of past attempts, and history of seeking help when needed. Therefore, based on all available evidence including the factors cited above, she does not appear to be at imminent risk for self-harm, does not meet criteria for a 72-hr hold, and therefore remains appropriate for ongoing outpatient level of care. No referrals.       ATTESTATION    Areas addressed: Generalized Anxiety disorder, Chronic, unstable; r/o ADHD  Medication management  No independent Historian  No outside data ordered or reviewed on this day  No social determinates of health impacting provider's ability to diagnose or treat.  Moderate risk of complications, morbidity, and/or mortality of patient management decision made at visit, associated with patient's problem, diagnoses, procedures and treatments.    Brandt Jeronimo, APRN, CNP, PFNP    As the provider I attest to compliance with applicable laws and regulations related to telemedicine.     The longitudinal plan of care for the diagnosis(es)/condition(s) as  documented were addressed during this visit. Due to the added complexity in care, I will continue to support Deepika in the subsequent management and with ongoing continuity of care.     24 minutes spent on the date of the encounter doing chart review, history and exam, documentation and further activities per the note.

## 2024-12-17 NOTE — PATIENT INSTRUCTIONS
"Deepika, it was a pleasure seeing you today. As we discussed, we will increase Pristiq to 50mg and see how things go. F/U 4 weeks.        Call the clinic with medication questions or concerns at [phone] or send a [employer] message. [employer] may be used to communicate with your provider, but this is not intended to be used for emergencies.     Crisis Resources for [facility]: First Call for Help: (211), [facility] Crisis Line available 24/7: ([phone])  [place] [facility]: National Crisis Text Line: text HOME to 382365    Crisis Resources for [place]: Adult Mental Health crisis: [phone], [place] Crisis Text Line: text \"[place]\" or \"HOME\" to 870165    Therapy Options in [place], [place] and Surrounding Area:    Garrett Terry, [facility] - ([phone])    Psychological Services - Arik Landers ([phone])    Mouna Andrade, [facility], family and individual therapy- ([phone])    [facility] - specializes in women and adolescent therapy - ([phone])    [facility] - EMDR, trauma, etc. ([phone])    [facility] - spiritual based support groups ([phone])    Troy Ochoa - adults, adolescents and children ([phone])    [facility] - several different therapy options adults and children ([phone])    [facility] - several options, one of the largest mental health providers in the area - ([phone])    [facility] - ([phone])    [facility] - offers several therapy options including 8-week \"express\" therapy program - ([phone])    [facility] - ([phone])    [facility] - adult therapy ([phone])    [facility] - ([phone])    [facility] - ([phone])    [facility] - ([phone])    [facility] - ([phone])    [facility] [place], [place] - " ([phone])    [facility], [place] - [phone]    [facility], [place] - [phone]    [facility], [place] - [phone]

## 2025-01-26 ASSESSMENT — ANXIETY QUESTIONNAIRES
GAD7 TOTAL SCORE: 3
8. IF YOU CHECKED OFF ANY PROBLEMS, HOW DIFFICULT HAVE THESE MADE IT FOR YOU TO DO YOUR WORK, TAKE CARE OF THINGS AT HOME, OR GET ALONG WITH OTHER PEOPLE?: SOMEWHAT DIFFICULT
5. BEING SO RESTLESS THAT IT IS HARD TO SIT STILL: NOT AT ALL
3. WORRYING TOO MUCH ABOUT DIFFERENT THINGS: NOT AT ALL
7. FEELING AFRAID AS IF SOMETHING AWFUL MIGHT HAPPEN: SEVERAL DAYS
IF YOU CHECKED OFF ANY PROBLEMS ON THIS QUESTIONNAIRE, HOW DIFFICULT HAVE THESE PROBLEMS MADE IT FOR YOU TO DO YOUR WORK, TAKE CARE OF THINGS AT HOME, OR GET ALONG WITH OTHER PEOPLE: SOMEWHAT DIFFICULT
7. FEELING AFRAID AS IF SOMETHING AWFUL MIGHT HAPPEN: SEVERAL DAYS
2. NOT BEING ABLE TO STOP OR CONTROL WORRYING: SEVERAL DAYS
1. FEELING NERVOUS, ANXIOUS, OR ON EDGE: NOT AT ALL
GAD7 TOTAL SCORE: 3
4. TROUBLE RELAXING: SEVERAL DAYS
6. BECOMING EASILY ANNOYED OR IRRITABLE: NOT AT ALL
GAD7 TOTAL SCORE: 3

## 2025-01-26 ASSESSMENT — PATIENT HEALTH QUESTIONNAIRE - PHQ9
SUM OF ALL RESPONSES TO PHQ QUESTIONS 1-9: 0
SUM OF ALL RESPONSES TO PHQ QUESTIONS 1-9: 0
10. IF YOU CHECKED OFF ANY PROBLEMS, HOW DIFFICULT HAVE THESE PROBLEMS MADE IT FOR YOU TO DO YOUR WORK, TAKE CARE OF THINGS AT HOME, OR GET ALONG WITH OTHER PEOPLE: NOT DIFFICULT AT ALL

## 2025-01-29 ENCOUNTER — OFFICE VISIT (OUTPATIENT)
Dept: PSYCHIATRY | Facility: OTHER | Age: 60
End: 2025-01-29
Attending: NURSE PRACTITIONER
Payer: COMMERCIAL

## 2025-01-29 VITALS
WEIGHT: 161.8 LBS | DIASTOLIC BLOOD PRESSURE: 80 MMHG | HEIGHT: 62 IN | RESPIRATION RATE: 20 BRPM | TEMPERATURE: 98.3 F | SYSTOLIC BLOOD PRESSURE: 151 MMHG | HEART RATE: 76 BPM | OXYGEN SATURATION: 95 % | BODY MASS INDEX: 29.77 KG/M2

## 2025-01-29 DIAGNOSIS — F41.1 GENERALIZED ANXIETY DISORDER: ICD-10-CM

## 2025-01-29 RX ORDER — DESVENLAFAXINE 50 MG/1
50 TABLET, FILM COATED, EXTENDED RELEASE ORAL DAILY
Qty: 30 TABLET | Refills: 3 | Status: SHIPPED | OUTPATIENT
Start: 2025-01-29

## 2025-01-29 ASSESSMENT — PAIN SCALES - GENERAL: PAINLEVEL_OUTOF10: NO PAIN (0)

## 2025-01-29 NOTE — PATIENT INSTRUCTIONS
"Deepika, it was a pleasure seeing you today. As we discussed, we will continue Pristiq at 50mg daily; however, if between visits you feel you need an increase, please reach out and we can bump up to 75mg daily.        Call the clinic with medication questions or concerns at [phone] or send a [employer] message. MyChart may be used to communicate with your provider, but this is not intended to be used for emergencies.     Crisis Resources for [facility]: First Call for Help: (211), [facility].P[facility] Crisis Line available 24/7: ([phone])  National Crisis Services: National Crisis Text Line: text HOME to 489542    Crisis Resources for [place]: Adult Mental Health crisis: [phone], [place] Crisis Text Line: text \"[place]\" or \"HOME\" to 787711    Therapy Options in [place], [place] and Surrounding Area:    Garrett Terry, [facility] - ([phone])    Psychological Services - Arik Landers ([phone])    Mouna Andrade, [facility], family and individual therapy- ([phone])    [facility] - specializes in women and adolescent therapy - ([phone])    [facility] - EMDR, trauma, etc. ([phone])    [facility] - spiritual based support groups ([phone])    [facility] Ocho[facility] - adults, adolescents and children ([phone])    [facility] - several different therapy options adults and children ([phone])    [facility] - several options, one of the largest mental health providers in the area - ([phone])    [facility] - ([phone])    [facility] - offers several therapy options including 8-week \"express\" therapy program - ([phone])    [facility] - ([phone])    [facility] - adult therapy ([phone])    [facility] - ([phone])    [facility] - ([phone])    [facility] - ([phone])    [facility], [place] - ([phone])    [facility], [place] - ([phone])    [facility], [place] - [phone]    [facility], [facility] [phone]    [facility], [place]  [phone]

## 2025-01-29 NOTE — NURSING NOTE
"Chief Complaint   Patient presents with    Medication Therapy Management     Patient presents to the Clinic today for medication management.    Initial BP (!) 151/80 (BP Location: Right arm, Patient Position: Sitting, Cuff Size: Adult Regular)   Pulse 76   Temp 98.3  F (36.8  C) (Temporal)   Resp 20   Ht 1.581 m (5' 2.25\")   Wt 73.4 kg (161 lb 12.8 oz)   SpO2 95%   BMI 29.36 kg/m   Estimated body mass index is 29.36 kg/m  as calculated from the following:    Height as of this encounter: 1.581 m (5' 2.25\").    Weight as of this encounter: 73.4 kg (161 lb 12.8 oz).  Medication Review: complete    The next two questions are to help us understand your food security.  If you are feeling you need any assistance in this area, we have resources available to support you today.          1/29/2025   SDOH- Food Insecurity   Within the past 12 months, did you worry that your food would run out before you got money to buy more? N   Within the past 12 months, did the food you bought just not last and you didn t have money to get more? N         Health Care Directive:  Patient does not have a Health Care Directive: Discussed advance care planning with patient; however, patient declined at this time.    Martha Plata      "

## 2025-01-29 NOTE — PROGRESS NOTES
[facility] AND [facility] PSYCHIATRY   HISTORY AND PHYSICAL       APPOINTMENT DATA     Puja Ocasio  Pronouns: MRN# [id number]   Age: 59 year old YOB: 1965     Source of Referral:   Primary Physician: Kayley George        ASSESSMENT & PLAN     This is a 59 year old female who presents for ongoing psychiatric care and medication management for the following:       Diagnosis Comments   1. Generalized anxiety disorder  desvenlafaxine (PRISTIQ) 25 MG 24 hr tablet       12/3/2024     8:07 AM 12/14/2024     5:54 PM 1/26/2025     3:58 PM   OLYA-7 SCORE   Total Score 20 (severe anxiety) 20 (severe anxiety) 3 (minimal anxiety)   Total Score 20  20  3        Patient-reported         12/3/2024     8:10 AM 12/14/2024     5:57 PM 1/26/2025     3:57 PM   PHQ   PHQ-9 Total Score 7  13  0    Q9: Thoughts of better off dead/self-harm past 2 weeks Not at all Not at all Not at all       Patient-reported               Significant improvement in mood with no depressive symptoms. Feels notably calmer. Some mild anxiety with worry that is hard to control at times, trouble relaxing and worrying that something bad will happen, but feels it is mostly manageable. Feels that some anxiety helps keep her motivated to do the things she needs to do on a daily basis. 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 Less muscle tension and neck pain. Ongoing issues with libido but feels like she is okay with that for now. Also talked to PCP about it and she wanted to refer her to OB, but at this time she doesn't feel that is necessary. No side effects noted other than it makes her tired, so she just takes at bed. Sleep is really good.    Medication:   Continue Pristiq to 50mg daily        Psychotherapy: None  Labs: No labs today    F/U: 3 months    The indications, risks, benefits, side effects, contraindications, possible interactions, and  alternatives  have been discussed and are understood by the patient. The patient understands the risks of using street drugs or alcohol. The patient understands to call 911, 211 ([facility] Crisis Line) or come to the nearest ED if life threatening or urgent symptoms present.        HISTORY OF PRESENT ILLNESS   Initial visit via video on 12/17/24. At that time:    Reporting that she has noted some improvement in symptoms since starting new medication. Specifically notes that the brain fog has lifted. That symptom may have been secondary to, or exacerbated by, Celexa. Also feels calmer but still has quite a bit of anxiety. Is taking at bed. Some fatigue during the day but also cut back on caffeine and switched to half-caf coffee, so is unsure if it's that or the medication. No significant side effects noted. Did ask about sexual side effects. Libido unchanged but has not been great for awhile. Asked if there was something she could take. Given this is a chronic issue, encouraged her to discuss further with OBGYN. We did discuss potential supplements available; however, none are FDA approved and most studies are limited.     Ongoing symptoms of trouble concentrating, difficulty organizing thoughts and planning things, feeling easily overwhelmed, frequent worry, restlessness and feeling like she always has to be doing things. Sleep remains good.  Ongoing issues with difficulty waiting turn in conversations, difficulty paying attention to what the other person is saying because she is caught up in her own thoughts related to what she is going to say next, often forgetting to eat when busy doing other things, as well as symptoms of muscle tension and neck pain.     Initial Onset: 25 years ago, spontaneous panic attacks  New Onset: About a year ago Effexor no longer seemed effective. Notable increase in symptoms since 10/8 when Celexa was started  Trigger: No identifiable trigger; probable that medication benefits no  "longer as significant after prolonged use.  Severity: Severe anxiety  Duration: 1-1.5 years after 25 years of remission  Modifying factors: None identified. Has not tried therapy. Gabapentin tried and also caused brain fog.  Treatment compliance: Good  Treatment response:  Good  Complicating/contributing factors to consider: Possible ADHD; Pack per day smoker for 30 years; History of seizure activity in childhood, treated with medications until the pregnancy with her first child. Has not had a seizure since, is on no medications, and was told she \"grew out of it.\"    Protective Factors: Family, significant other, employment,      [facility] Update no changes   Psychiatric:   Past medication trials and treatments include   Effexor (25 years), Gabapentin, Citalopram (6 weeks)     Currently in counseling: No  Past hospitalizations: Denied  Trauma: Denied  Self-injurious behavior: The patient has no history of SIB .   Suicide attempts: Denied    Social:  Resides with significant other of 17 years. Has two adult children, a son and daughter, and four grandchildren. Employed as a  for [employer] for the last 8 years. No college. No legal issues. No  history. No specific spiritual or cultural affiliations but \"believes in God.\"    Chemical Use:  Denied    Family:  The patient reports a family history of ADHD in son, daughter, one grandchild and sister. Anxiety.               REVIEW OF SYSTEMS              Review Of Systems    Skin: recent surgical removal of lesions on face  Eyes: negative  Ears/Nose/Throat: negative  Respiratory: COPD and asthma  Cardiovascular: negative  Gastrointestinal: negative  Genitourinary: negative  Musculoskeletal: negative  Neurologic: negative  Psychiatric: as noted above  Hematologic/Lymphatic/Immunologic: negative  Endocrine: negative       MEDICATIONS   Current Outpatient Medications   Medication Sig Dispense Refill    albuterol (PROAIR HFA/PROVENTIL HFA/VENTOLIN HFA) 108 (90 " Base) MCG/ACT inhaler Inhale 2 puffs into the lungs every 4 hours as needed for shortness of breath, wheezing or cough. 25.5 g 11    azithromycin (ZITHROMAX) 250 MG tablet TAKE 1 TABLET BY MOUTH 3 TIMES WEEKLY 90 tablet 4    cyclobenzaprine (FLEXERIL) 10 MG tablet Take 1 tablet (10 mg) by mouth 3 times daily as needed for muscle spasms. 30 tablet 3    desvenlafaxine succinate (PRISTIQ) 50 MG 24 hr tablet Take 1 tablet (50 mg) by mouth daily. 30 tablet 1    EPINEPHrine (ANY BX GENERIC EQUIV) 0.3 MG/0.3ML injection 2-pack Inject 0.3 mLs (0.3 mg) into the muscle once as needed for anaphylaxis For allergic reaction 2 each 1    Fluticasone-Umeclidin-Vilanterol (TRELEGY ELLIPTA) 200-62.5-25 MCG/ACT oral inhaler Inhale 1 puff into the lungs daily. 60 each 11    varenicline (CHANTIX ARMANDO) 0.5 MG X 11 & 1 MG X 42 tablet Take 0.5 mg tab daily for 3 days, THEN 0.5 mg tab twice daily for 4 days, THEN 1 mg twice daily. 53 tablet 0     No current facility-administered medications for this visit.       Allergies   Allergen Reactions    Tree Nuts  [Nuts] Anaphylaxis    Metronidazole Other (See Comments)     Pt can't remember.     Tetracycline Other (See Comments)     Pt can't remember.             PAST MEDICAL HISTORY   Past Medical History:   Diagnosis Date    Abnormal cytological findings in specimens from other organs, systems and tissues     HPV+ PAP -2008, normal paps 2009, 2010 (no endo cells)    Epilepsy without status epilepticus, not intractable (H)     No Comments Provided    Gastritis without bleeding     05/02/06,treated 05/06    Other specified postprocedural states     2008 (negative)    Tinea pedis     8/6/2010    Tobacco use     Attempts at cessation: electric cigarette, patches, chantix.             Allergies   Allergen Reactions    Tree Nuts  [Nuts] Anaphylaxis    Metronidazole Other (See Comments)     Pt can't remember.     Tetracycline Other (See Comments)     Pt can't remember.          MENTAL STATUS EXAM    Vitals:     Appearance:  awake, alert and adequately groomed  Attitude:  cooperative  Eye Contact:  good  Mood: Good  Affect:  mood congruent  Speech:  clear, coherent  Psychomotor Behavior:  no evidence of tardive dyskinesia, dystonia, or tics  Thought Process:  logical, linear, and goal oriented  Associations:  no loose associations  Thought Content:  no evidence of suicidal ideation or homicidal ideation and no evidence of psychotic thought  Insight:  good  Judgment:  intact  Oriented to:  time, person, and place  Attention Span and Concentration:  intact  Recent and Remote Memory:  intact  Language: Able to name objects and Able to repeat phrases  Fund of Knowledge: appropriate      Suicide Risk Assessment:  Today Puja Ocasio denied SI. In addition, she has notable risk factors for self-harm, including anxiety. However, risk is mitigated by commitment to family, sobriety, absence of past attempts, and history of seeking help when needed. Therefore, based on all available evidence including the factors cited above, she does not appear to be at imminent risk for self-harm, does not meet criteria for a 72-hr hold, and therefore remains appropriate for ongoing outpatient level of care. No referrals.       ATTESTATION    Areas addressed: Generalized Anxiety disorder, Chronic, stable  Medication management  No independent Historian  No outside data ordered or reviewed on this day  No social determinates of health impacting provider's ability to diagnose or treat.  Moderate risk of complications, morbidity, and/or mortality of patient management decision made at visit, associated with patient's problem, diagnoses, procedures and treatments.    BLACK Aguilera, CNP, PFNP    As the provider I attest to compliance with applicable laws and regulations related to telemedicine.     The longitudinal plan of care for the diagnosis(es)/condition(s) as documented were addressed during this visit. Due to the added  complexity in care, I will continue to support Deepika in the subsequent management and with ongoing continuity of care.     23 minutes spent on the date of the encounter doing chart review, history and exam, documentation and further activities per the note.

## 2025-05-19 ENCOUNTER — TRANSFERRED RECORDS (OUTPATIENT)
Dept: HEALTH INFORMATION MANAGEMENT | Facility: OTHER | Age: 60
End: 2025-05-19
Payer: COMMERCIAL

## 2025-06-17 DIAGNOSIS — F41.1 GENERALIZED ANXIETY DISORDER: ICD-10-CM

## 2025-06-19 RX ORDER — DESVENLAFAXINE 50 MG/1
50 TABLET, FILM COATED, EXTENDED RELEASE ORAL DAILY
Qty: 30 TABLET | Refills: 0 | Status: SHIPPED | OUTPATIENT
Start: 2025-06-19

## 2025-06-19 NOTE — TELEPHONE ENCOUNTER
[facility] sent Rx request for the following:      Requested Prescriptions   Pending Prescriptions Disp Refills    desvenlafaxine (PRISTIQ) 50 MG 24 hr tablet [Pharmacy Med Name: desvenlafaxine succinate ER 50 mg tablet,extended release 24 hr] 30 tablet 3     Sig: Take 1 tablet (50 mg) by mouth daily.       Serotonin-Norepinephrine Reuptake Inhibitors  Failed - 6/19/2025 11:09 AM        Failed - Most recent blood pressure under 140/90 in past 12 months     BP Readings from Last 3 Encounters:   01/29/25 (!) 151/80   12/05/24 124/68   12/03/24 (!) 148/78       Systolic (Patient Reported): 148 (12/3/2024  1:18 PM)  Diastolic (Patient Reported): 78 (12/3/2024  1:18 PM)           Generalized anxiety disorder [F41.1]     Last Prescription Date:   1/29/25  Last Fill Qty/Refills:         30, R-3    Last Office Visit:              1/29/25    Future Office visit:           None    Per LOV note:  F/U: 3 months     Unable to complete prescription refill per RN Medication Refill Policy.     Ernst Joshua RN on 6/19/2025 at 11:10 AM

## 2025-07-17 DIAGNOSIS — F41.1 GENERALIZED ANXIETY DISORDER: ICD-10-CM

## 2025-07-17 RX ORDER — DESVENLAFAXINE 50 MG/1
50 TABLET, FILM COATED, EXTENDED RELEASE ORAL DAILY
Qty: 15 TABLET | Refills: 0 | Status: SHIPPED | OUTPATIENT
Start: 2025-07-17

## 2025-08-07 ENCOUNTER — MYC REFILL (OUTPATIENT)
Dept: PSYCHIATRY | Facility: OTHER | Age: 60
End: 2025-08-07
Payer: COMMERCIAL

## 2025-08-07 DIAGNOSIS — F41.1 GENERALIZED ANXIETY DISORDER: ICD-10-CM

## 2025-08-07 RX ORDER — DESVENLAFAXINE 50 MG/1
50 TABLET, FILM COATED, EXTENDED RELEASE ORAL DAILY
Qty: 30 TABLET | Refills: 0 | Status: SHIPPED | OUTPATIENT
Start: 2025-08-07

## 2025-08-11 DIAGNOSIS — J44.9 COPD, SEVERE (H): ICD-10-CM

## 2025-08-12 RX ORDER — FLUTICASONE FUROATE, UMECLIDINIUM BROMIDE AND VILANTEROL TRIFENATATE 200; 62.5; 25 UG/1; UG/1; UG/1
1 POWDER RESPIRATORY (INHALATION) DAILY
Qty: 60 EACH | Refills: 2 | Status: SHIPPED | OUTPATIENT
Start: 2025-08-12

## 2025-08-19 ENCOUNTER — OFFICE VISIT (OUTPATIENT)
Dept: PSYCHIATRY | Facility: OTHER | Age: 60
End: 2025-08-19
Attending: NURSE PRACTITIONER
Payer: COMMERCIAL

## 2025-08-19 VITALS
RESPIRATION RATE: 12 BRPM | TEMPERATURE: 98.8 F | SYSTOLIC BLOOD PRESSURE: 151 MMHG | HEART RATE: 69 BPM | WEIGHT: 154.7 LBS | BODY MASS INDEX: 28.07 KG/M2 | DIASTOLIC BLOOD PRESSURE: 86 MMHG | OXYGEN SATURATION: 98 %

## 2025-08-19 DIAGNOSIS — F41.1 GENERALIZED ANXIETY DISORDER: ICD-10-CM

## 2025-08-19 RX ORDER — DESVENLAFAXINE 25 MG/1
25 TABLET, EXTENDED RELEASE ORAL DAILY
Qty: 30 TABLET | Refills: 11 | Status: SHIPPED | OUTPATIENT
Start: 2025-08-19

## 2025-08-19 RX ORDER — DESVENLAFAXINE 50 MG/1
50 TABLET, FILM COATED, EXTENDED RELEASE ORAL DAILY
Qty: 30 TABLET | Refills: 11 | Status: SHIPPED | OUTPATIENT
Start: 2025-08-19

## 2025-08-19 ASSESSMENT — ANXIETY QUESTIONNAIRES
GAD7 TOTAL SCORE: 6
5. BEING SO RESTLESS THAT IT IS HARD TO SIT STILL: NOT AT ALL
6. BECOMING EASILY ANNOYED OR IRRITABLE: MORE THAN HALF THE DAYS
1. FEELING NERVOUS, ANXIOUS, OR ON EDGE: SEVERAL DAYS
GAD7 TOTAL SCORE: 6
7. FEELING AFRAID AS IF SOMETHING AWFUL MIGHT HAPPEN: NOT AT ALL
3. WORRYING TOO MUCH ABOUT DIFFERENT THINGS: SEVERAL DAYS
IF YOU CHECKED OFF ANY PROBLEMS ON THIS QUESTIONNAIRE, HOW DIFFICULT HAVE THESE PROBLEMS MADE IT FOR YOU TO DO YOUR WORK, TAKE CARE OF THINGS AT HOME, OR GET ALONG WITH OTHER PEOPLE: NOT DIFFICULT AT ALL
GAD7 TOTAL SCORE: 6
8. IF YOU CHECKED OFF ANY PROBLEMS, HOW DIFFICULT HAVE THESE MADE IT FOR YOU TO DO YOUR WORK, TAKE CARE OF THINGS AT HOME, OR GET ALONG WITH OTHER PEOPLE?: NOT DIFFICULT AT ALL
2. NOT BEING ABLE TO STOP OR CONTROL WORRYING: SEVERAL DAYS
7. FEELING AFRAID AS IF SOMETHING AWFUL MIGHT HAPPEN: NOT AT ALL
4. TROUBLE RELAXING: SEVERAL DAYS

## 2025-08-19 ASSESSMENT — PATIENT HEALTH QUESTIONNAIRE - PHQ9
SUM OF ALL RESPONSES TO PHQ QUESTIONS 1-9: 1
SUM OF ALL RESPONSES TO PHQ QUESTIONS 1-9: 1
10. IF YOU CHECKED OFF ANY PROBLEMS, HOW DIFFICULT HAVE THESE PROBLEMS MADE IT FOR YOU TO DO YOUR WORK, TAKE CARE OF THINGS AT HOME, OR GET ALONG WITH OTHER PEOPLE: SOMEWHAT DIFFICULT

## 2025-08-19 ASSESSMENT — PAIN SCALES - GENERAL: PAINLEVEL_OUTOF10: NO PAIN (0)

## (undated) RX ORDER — ALBUTEROL SULFATE 0.83 MG/ML
SOLUTION RESPIRATORY (INHALATION)
Status: DISPENSED
Start: 2019-09-28

## (undated) RX ORDER — BUDESONIDE 0.5 MG/2ML
INHALANT ORAL
Status: DISPENSED
Start: 2019-09-28

## (undated) RX ORDER — PREDNISONE 20 MG/1
TABLET ORAL
Status: DISPENSED
Start: 2019-09-28

## (undated) RX ORDER — AZITHROMYCIN 250 MG/1
TABLET, FILM COATED ORAL
Status: DISPENSED
Start: 2019-09-28

## (undated) RX ORDER — TRIAMCINOLONE ACETONIDE 40 MG/ML
INJECTION, SUSPENSION INTRA-ARTICULAR; INTRAMUSCULAR
Status: DISPENSED
Start: 2019-09-30

## (undated) RX ORDER — IPRATROPIUM BROMIDE AND ALBUTEROL SULFATE 2.5; .5 MG/3ML; MG/3ML
SOLUTION RESPIRATORY (INHALATION)
Status: DISPENSED
Start: 2019-09-28